# Patient Record
Sex: FEMALE | Race: WHITE | Employment: OTHER | URBAN - METROPOLITAN AREA
[De-identification: names, ages, dates, MRNs, and addresses within clinical notes are randomized per-mention and may not be internally consistent; named-entity substitution may affect disease eponyms.]

---

## 2017-01-04 ENCOUNTER — DOCTOR'S OFFICE (OUTPATIENT)
Dept: URBAN - METROPOLITAN AREA CLINIC 136 | Facility: CLINIC | Age: 82
Setting detail: OPHTHALMOLOGY
End: 2017-01-04
Payer: COMMERCIAL

## 2017-01-04 DIAGNOSIS — H27.8: ICD-10-CM

## 2017-01-04 DIAGNOSIS — H40.011: ICD-10-CM

## 2017-01-04 DIAGNOSIS — H04.121: ICD-10-CM

## 2017-01-04 DIAGNOSIS — H40.012: ICD-10-CM

## 2017-01-04 DIAGNOSIS — H04.122: ICD-10-CM

## 2017-01-04 DIAGNOSIS — H43.813: ICD-10-CM

## 2017-01-04 DIAGNOSIS — H27.9: ICD-10-CM

## 2017-01-04 PROCEDURE — 92012 INTRM OPH EXAM EST PATIENT: CPT | Performed by: OPHTHALMOLOGY

## 2017-01-04 PROCEDURE — 83861 MICROFLUID ANALY TEARS: CPT | Performed by: OPHTHALMOLOGY

## 2017-01-04 ASSESSMENT — REFRACTION_AUTOREFRACTION
OS_CYLINDER: -1.00
OD_AXIS: 114
OD_CYLINDER: -1.25
OS_AXIS: 100
OS_SPHERE: +1.25
OD_SPHERE: +0.50

## 2017-01-04 ASSESSMENT — PUNCTA - ASSESSMENT: OD_PUNCTA: SIL PLUG RLL

## 2017-01-04 ASSESSMENT — REFRACTION_MANIFEST
OD_VA1: 20/
OS_VA1: 20/
OS_VA2: 20/
OS_VA2: 20/
OD_VA2: 20/
OU_VA: 20/
OD_VA2: 20/
OD_VA3: 20/
OS_VA3: 20/
OD_VA1: 20/
OS_VA1: 20/
OD_VA3: 20/
OU_VA: 20/
OD_VA3: 20/
OD_VA1: 20/
OD_VA2: 20/
OU_VA: 20/
OS_VA1: 20/
OS_VA2: 20/

## 2017-01-04 ASSESSMENT — SPHEQUIV_DERIVED
OD_SPHEQUIV: -0.125
OS_SPHEQUIV: 0.75

## 2017-01-04 ASSESSMENT — REFRACTION_CURRENTRX
OS_OVR_VA: 20/
OD_OVR_VA: 20/
OD_OVR_VA: 20/
OS_OVR_VA: 20/
OS_OVR_VA: 20/
OD_OVR_VA: 20/

## 2017-01-04 ASSESSMENT — SUPERFICIAL PUNCTATE KERATITIS (SPK)
OS_SPK: T
OD_SPK: T

## 2017-01-04 ASSESSMENT — CONFRONTATIONAL VISUAL FIELD TEST (CVF)
OD_FINDINGS: FULL
OS_FINDINGS: FULL

## 2017-01-04 ASSESSMENT — VISUAL ACUITY
OD_BCVA: 20/40
OS_BCVA: 20/40+2

## 2017-02-28 ENCOUNTER — DOCTOR'S OFFICE (OUTPATIENT)
Dept: URBAN - METROPOLITAN AREA CLINIC 136 | Facility: CLINIC | Age: 82
Setting detail: OPHTHALMOLOGY
End: 2017-02-28
Payer: COMMERCIAL

## 2017-02-28 DIAGNOSIS — H27.8: ICD-10-CM

## 2017-02-28 DIAGNOSIS — H27.9: ICD-10-CM

## 2017-02-28 DIAGNOSIS — H40.011: ICD-10-CM

## 2017-02-28 DIAGNOSIS — H04.121: ICD-10-CM

## 2017-02-28 DIAGNOSIS — H43.813: ICD-10-CM

## 2017-02-28 DIAGNOSIS — H40.012: ICD-10-CM

## 2017-02-28 DIAGNOSIS — H04.122: ICD-10-CM

## 2017-02-28 PROCEDURE — 92012 INTRM OPH EXAM EST PATIENT: CPT | Performed by: OPHTHALMOLOGY

## 2017-02-28 PROCEDURE — 83861 MICROFLUID ANALY TEARS: CPT | Performed by: OPHTHALMOLOGY

## 2017-02-28 ASSESSMENT — PUNCTA - ASSESSMENT: OD_PUNCTA: SIL PLUG RLL

## 2017-02-28 ASSESSMENT — REFRACTION_MANIFEST
OS_VA1: 20/
OU_VA: 20/
OS_VA1: 20/
OS_VA3: 20/
OD_VA3: 20/
OD_VA3: 20/
OD_VA1: 20/
OD_VA2: 20/
OU_VA: 20/
OS_VA1: 20/
OD_VA2: 20/
OS_VA2: 20/
OS_VA2: 20/
OU_VA: 20/
OD_VA1: 20/
OS_VA2: 20/
OS_VA3: 20/
OD_VA3: 20/
OD_VA1: 20/
OS_VA3: 20/
OD_VA2: 20/

## 2017-02-28 ASSESSMENT — VISUAL ACUITY
OS_BCVA: 20/30+2
OD_BCVA: 20/50-1

## 2017-02-28 ASSESSMENT — REFRACTION_AUTOREFRACTION
OD_SPHERE: +0.50
OS_SPHERE: +1.25
OS_AXIS: 100
OS_CYLINDER: -1.00
OD_CYLINDER: -1.25
OD_AXIS: 114

## 2017-02-28 ASSESSMENT — SUPERFICIAL PUNCTATE KERATITIS (SPK)
OS_SPK: T
OD_SPK: ABSENT

## 2017-02-28 ASSESSMENT — SPHEQUIV_DERIVED
OS_SPHEQUIV: 0.75
OD_SPHEQUIV: -0.125

## 2017-02-28 ASSESSMENT — REFRACTION_CURRENTRX
OS_OVR_VA: 20/
OD_OVR_VA: 20/
OD_OVR_VA: 20/
OS_OVR_VA: 20/
OS_OVR_VA: 20/
OD_OVR_VA: 20/

## 2017-02-28 ASSESSMENT — CONFRONTATIONAL VISUAL FIELD TEST (CVF)
OS_FINDINGS: FULL
OD_FINDINGS: FULL

## 2017-05-15 ENCOUNTER — DOCTOR'S OFFICE (OUTPATIENT)
Dept: URBAN - METROPOLITAN AREA CLINIC 136 | Facility: CLINIC | Age: 82
Setting detail: OPHTHALMOLOGY
End: 2017-05-15
Payer: COMMERCIAL

## 2017-05-15 DIAGNOSIS — H34.8322: ICD-10-CM

## 2017-05-15 PROCEDURE — 92014 COMPRE OPH EXAM EST PT 1/>: CPT | Performed by: OPHTHALMOLOGY

## 2017-05-15 ASSESSMENT — REFRACTION_AUTOREFRACTION
OD_SPHERE: +0.50
OD_AXIS: 114
OS_CYLINDER: -1.00
OS_AXIS: 100
OD_CYLINDER: -1.25
OS_SPHERE: +1.25

## 2017-05-15 ASSESSMENT — REFRACTION_MANIFEST
OS_VA1: 20/
OU_VA: 20/
OS_VA2: 20/
OD_VA1: 20/
OU_VA: 20/
OS_VA3: 20/
OD_VA1: 20/
OS_VA1: 20/
OD_VA1: 20/
OD_VA3: 20/
OS_VA1: 20/
OD_VA3: 20/
OS_VA3: 20/
OU_VA: 20/
OD_VA2: 20/
OD_VA2: 20/
OS_VA3: 20/
OS_VA2: 20/
OS_VA2: 20/
OD_VA2: 20/
OD_VA3: 20/

## 2017-05-15 ASSESSMENT — SPHEQUIV_DERIVED
OD_SPHEQUIV: -0.125
OS_SPHEQUIV: 0.75

## 2017-05-15 ASSESSMENT — REFRACTION_CURRENTRX
OS_OVR_VA: 20/
OD_OVR_VA: 20/

## 2017-05-15 ASSESSMENT — SUPERFICIAL PUNCTATE KERATITIS (SPK)
OS_SPK: T
OD_SPK: ABSENT

## 2017-05-15 ASSESSMENT — VISUAL ACUITY
OD_BCVA: 20/40
OS_BCVA: 20/20

## 2017-05-15 ASSESSMENT — CONFRONTATIONAL VISUAL FIELD TEST (CVF)
OD_FINDINGS: FULL
OS_FINDINGS: FULL

## 2017-05-15 ASSESSMENT — PUNCTA - ASSESSMENT: OD_PUNCTA: SIL PLUG RLL

## 2017-05-22 ENCOUNTER — DOCTOR'S OFFICE (OUTPATIENT)
Dept: URBAN - METROPOLITAN AREA CLINIC 136 | Facility: CLINIC | Age: 82
Setting detail: OPHTHALMOLOGY
End: 2017-05-22
Payer: COMMERCIAL

## 2017-05-22 ENCOUNTER — RX ONLY (RX ONLY)
Age: 82
End: 2017-05-22

## 2017-05-22 DIAGNOSIS — H43.813: ICD-10-CM

## 2017-05-22 DIAGNOSIS — H40.012: ICD-10-CM

## 2017-05-22 DIAGNOSIS — H27.8: ICD-10-CM

## 2017-05-22 DIAGNOSIS — Q14.8: ICD-10-CM

## 2017-05-22 DIAGNOSIS — H04.121: ICD-10-CM

## 2017-05-22 DIAGNOSIS — H40.011: ICD-10-CM

## 2017-05-22 DIAGNOSIS — H35.81: ICD-10-CM

## 2017-05-22 DIAGNOSIS — H27.9: ICD-10-CM

## 2017-05-22 DIAGNOSIS — H04.122: ICD-10-CM

## 2017-05-22 PROCEDURE — 92250 FUNDUS PHOTOGRAPHY W/I&R: CPT | Performed by: OPHTHALMOLOGY

## 2017-05-22 PROCEDURE — 92014 COMPRE OPH EXAM EST PT 1/>: CPT | Performed by: OPHTHALMOLOGY

## 2017-05-22 PROCEDURE — 92235 FLUORESCEIN ANGRPH MLTIFRAME: CPT | Performed by: OPHTHALMOLOGY

## 2017-05-22 PROCEDURE — 67028 INJECTION EYE DRUG: CPT | Performed by: OPHTHALMOLOGY

## 2017-05-22 ASSESSMENT — VISUAL ACUITY
OD_BCVA: 20/40
OS_BCVA: 20/25+2

## 2017-05-22 ASSESSMENT — REFRACTION_MANIFEST
OS_VA2: 20/
OD_VA2: 20/
OS_VA1: 20/
OS_VA2: 20/
OD_VA3: 20/
OS_VA3: 20/
OD_VA1: 20/
OU_VA: 20/
OD_VA3: 20/
OD_VA3: 20/
OU_VA: 20/
OD_VA1: 20/
OS_VA1: 20/
OS_VA3: 20/
OD_VA2: 20/
OS_VA1: 20/
OU_VA: 20/
OS_VA3: 20/
OD_VA1: 20/
OD_VA2: 20/
OS_VA2: 20/

## 2017-05-22 ASSESSMENT — REFRACTION_AUTOREFRACTION
OS_SPHERE: +1.25
OD_AXIS: 114
OS_AXIS: 100
OD_SPHERE: +0.50
OD_CYLINDER: -1.25
OS_CYLINDER: -1.00

## 2017-05-22 ASSESSMENT — REFRACTION_CURRENTRX
OD_OVR_VA: 20/
OS_OVR_VA: 20/
OD_OVR_VA: 20/
OD_OVR_VA: 20/

## 2017-05-22 ASSESSMENT — SPHEQUIV_DERIVED
OD_SPHEQUIV: -0.125
OS_SPHEQUIV: 0.75

## 2017-05-22 ASSESSMENT — CONFRONTATIONAL VISUAL FIELD TEST (CVF)
OS_FINDINGS: FULL
OD_FINDINGS: FULL

## 2017-05-22 ASSESSMENT — PUNCTA - ASSESSMENT: OD_PUNCTA: SIL PLUG RLL

## 2017-05-22 ASSESSMENT — SUPERFICIAL PUNCTATE KERATITIS (SPK)
OS_SPK: T
OD_SPK: ABSENT

## 2017-06-30 ENCOUNTER — DOCTOR'S OFFICE (OUTPATIENT)
Dept: URBAN - METROPOLITAN AREA CLINIC 136 | Facility: CLINIC | Age: 82
Setting detail: OPHTHALMOLOGY
End: 2017-06-30
Payer: COMMERCIAL

## 2017-06-30 DIAGNOSIS — H43.813: ICD-10-CM

## 2017-06-30 DIAGNOSIS — H40.012: ICD-10-CM

## 2017-06-30 DIAGNOSIS — H40.011: ICD-10-CM

## 2017-06-30 DIAGNOSIS — H04.122: ICD-10-CM

## 2017-06-30 DIAGNOSIS — Q14.8: ICD-10-CM

## 2017-06-30 DIAGNOSIS — H27.8: ICD-10-CM

## 2017-06-30 DIAGNOSIS — H35.81: ICD-10-CM

## 2017-06-30 DIAGNOSIS — H04.121: ICD-10-CM

## 2017-06-30 DIAGNOSIS — G43.809: ICD-10-CM

## 2017-06-30 DIAGNOSIS — H27.9: ICD-10-CM

## 2017-06-30 PROCEDURE — 92014 COMPRE OPH EXAM EST PT 1/>: CPT | Performed by: OPHTHALMOLOGY

## 2017-06-30 PROCEDURE — 92134 CPTRZ OPH DX IMG PST SGM RTA: CPT | Performed by: OPHTHALMOLOGY

## 2017-06-30 ASSESSMENT — SPHEQUIV_DERIVED
OD_SPHEQUIV: -0.125
OS_SPHEQUIV: 0.75

## 2017-06-30 ASSESSMENT — SUPERFICIAL PUNCTATE KERATITIS (SPK)
OS_SPK: T
OD_SPK: ABSENT

## 2017-06-30 ASSESSMENT — REFRACTION_MANIFEST
OD_VA2: 20/
OD_VA2: 20/
OS_VA2: 20/
OU_VA: 20/
OS_VA1: 20/
OD_VA3: 20/
OS_VA3: 20/
OS_VA1: 20/
OD_VA2: 20/
OU_VA: 20/
OD_VA1: 20/
OD_VA3: 20/
OS_VA2: 20/
OS_VA1: 20/
OD_VA1: 20/
OU_VA: 20/
OS_VA2: 20/
OD_VA3: 20/
OS_VA3: 20/
OS_VA3: 20/
OD_VA1: 20/

## 2017-06-30 ASSESSMENT — REFRACTION_CURRENTRX
OS_OVR_VA: 20/
OD_OVR_VA: 20/

## 2017-06-30 ASSESSMENT — PUNCTA - ASSESSMENT: OD_PUNCTA: SIL PLUG RLL

## 2017-06-30 ASSESSMENT — REFRACTION_AUTOREFRACTION
OD_AXIS: 114
OS_SPHERE: +1.25
OD_CYLINDER: -1.25
OS_CYLINDER: -1.00
OD_SPHERE: +0.50
OS_AXIS: 100

## 2017-06-30 ASSESSMENT — CONFRONTATIONAL VISUAL FIELD TEST (CVF)
OS_FINDINGS: FULL
OD_FINDINGS: FULL

## 2017-06-30 ASSESSMENT — VISUAL ACUITY
OD_BCVA: 20/40-2
OS_BCVA: 20/20-2

## 2017-07-24 ENCOUNTER — DOCTOR'S OFFICE (OUTPATIENT)
Dept: URBAN - METROPOLITAN AREA CLINIC 136 | Facility: CLINIC | Age: 82
Setting detail: OPHTHALMOLOGY
End: 2017-07-24
Payer: COMMERCIAL

## 2017-07-24 DIAGNOSIS — Z96.1: ICD-10-CM

## 2017-07-24 DIAGNOSIS — Q14.8: ICD-10-CM

## 2017-07-24 DIAGNOSIS — H43.813: ICD-10-CM

## 2017-07-24 DIAGNOSIS — G43.809: ICD-10-CM

## 2017-07-24 DIAGNOSIS — H35.81: ICD-10-CM

## 2017-07-24 PROCEDURE — 92134 CPTRZ OPH DX IMG PST SGM RTA: CPT | Performed by: OPHTHALMOLOGY

## 2017-07-24 PROCEDURE — 92014 COMPRE OPH EXAM EST PT 1/>: CPT | Performed by: OPHTHALMOLOGY

## 2017-07-24 ASSESSMENT — REFRACTION_AUTOREFRACTION
OD_SPHERE: +0.50
OS_SPHERE: +1.25
OS_CYLINDER: -1.00
OD_CYLINDER: -1.25
OS_AXIS: 100
OD_AXIS: 114

## 2017-07-24 ASSESSMENT — REFRACTION_MANIFEST
OD_VA1: 20/
OS_VA3: 20/
OS_VA2: 20/
OD_VA2: 20/
OS_VA3: 20/
OS_VA3: 20/
OD_VA3: 20/
OD_VA2: 20/
OS_VA1: 20/
OU_VA: 20/
OS_VA1: 20/
OD_VA3: 20/
OS_VA2: 20/
OD_VA1: 20/
OS_VA2: 20/
OD_VA3: 20/
OD_VA2: 20/
OU_VA: 20/
OU_VA: 20/
OS_VA1: 20/
OD_VA1: 20/

## 2017-07-24 ASSESSMENT — REFRACTION_CURRENTRX
OD_OVR_VA: 20/
OS_OVR_VA: 20/
OS_OVR_VA: 20/
OD_OVR_VA: 20/
OD_OVR_VA: 20/
OS_OVR_VA: 20/

## 2017-07-24 ASSESSMENT — SPHEQUIV_DERIVED
OS_SPHEQUIV: 0.75
OD_SPHEQUIV: -0.125

## 2017-07-24 ASSESSMENT — SUPERFICIAL PUNCTATE KERATITIS (SPK)
OS_SPK: T
OD_SPK: ABSENT

## 2017-07-24 ASSESSMENT — VISUAL ACUITY
OD_BCVA: 20/40+3
OS_BCVA: 20/20-2

## 2017-07-24 ASSESSMENT — CONFRONTATIONAL VISUAL FIELD TEST (CVF)
OS_FINDINGS: FULL
OD_FINDINGS: FULL

## 2017-07-24 ASSESSMENT — PUNCTA - ASSESSMENT: OD_PUNCTA: SIL PLUG RLL

## 2017-09-26 ENCOUNTER — DOCTOR'S OFFICE (OUTPATIENT)
Dept: URBAN - METROPOLITAN AREA CLINIC 136 | Facility: CLINIC | Age: 82
Setting detail: OPHTHALMOLOGY
End: 2017-09-26
Payer: COMMERCIAL

## 2017-09-26 DIAGNOSIS — H04.123: ICD-10-CM

## 2017-09-26 DIAGNOSIS — Q14.8: ICD-10-CM

## 2017-09-26 DIAGNOSIS — H35.81: ICD-10-CM

## 2017-09-26 DIAGNOSIS — H43.813: ICD-10-CM

## 2017-09-26 PROCEDURE — 92134 CPTRZ OPH DX IMG PST SGM RTA: CPT | Performed by: OPHTHALMOLOGY

## 2017-09-26 PROCEDURE — 92012 INTRM OPH EXAM EST PATIENT: CPT | Performed by: OPHTHALMOLOGY

## 2017-09-26 ASSESSMENT — REFRACTION_MANIFEST
OU_VA: 20/
OS_VA1: 20/
OU_VA: 20/
OU_VA: 20/
OS_VA2: 20/
OS_VA3: 20/
OS_VA2: 20/
OS_VA3: 20/
OS_VA2: 20/
OS_VA1: 20/
OS_VA1: 20/
OD_VA3: 20/
OD_VA2: 20/
OD_VA1: 20/
OD_VA2: 20/
OD_VA3: 20/
OS_VA3: 20/
OD_VA3: 20/
OD_VA1: 20/
OD_VA2: 20/
OD_VA1: 20/

## 2017-09-26 ASSESSMENT — REFRACTION_AUTOREFRACTION
OS_AXIS: 100
OS_SPHERE: +1.25
OD_AXIS: 114
OD_CYLINDER: -1.25
OD_SPHERE: +0.50
OS_CYLINDER: -1.00

## 2017-09-26 ASSESSMENT — SPHEQUIV_DERIVED
OS_SPHEQUIV: 0.75
OD_SPHEQUIV: -0.125

## 2017-09-26 ASSESSMENT — REFRACTION_CURRENTRX
OD_OVR_VA: 20/
OD_OVR_VA: 20/
OS_OVR_VA: 20/
OD_OVR_VA: 20/

## 2017-09-26 ASSESSMENT — VISUAL ACUITY
OD_BCVA: 20/30-2
OS_BCVA: 20/30

## 2017-09-26 ASSESSMENT — CONFRONTATIONAL VISUAL FIELD TEST (CVF)
OS_FINDINGS: FULL
OD_FINDINGS: FULL

## 2017-09-26 ASSESSMENT — SUPERFICIAL PUNCTATE KERATITIS (SPK)
OD_SPK: T
OS_SPK: T

## 2017-09-26 ASSESSMENT — TEAR BREAK UP TIME (TBUT)
OD_TBUT: LOW TEAR FILM
OS_TBUT: LOW TEAR FILM

## 2017-09-26 ASSESSMENT — PUNCTA - ASSESSMENT: OD_PUNCTA: SIL PLUG RLL

## 2017-12-26 ENCOUNTER — DOCTOR'S OFFICE (OUTPATIENT)
Dept: URBAN - METROPOLITAN AREA CLINIC 136 | Facility: CLINIC | Age: 82
Setting detail: OPHTHALMOLOGY
End: 2017-12-26
Payer: COMMERCIAL

## 2017-12-26 ENCOUNTER — RX ONLY (RX ONLY)
Age: 82
End: 2017-12-26

## 2017-12-26 DIAGNOSIS — H40.013: ICD-10-CM

## 2017-12-26 DIAGNOSIS — H43.813: ICD-10-CM

## 2017-12-26 DIAGNOSIS — Q14.8: ICD-10-CM

## 2017-12-26 DIAGNOSIS — Z96.1: ICD-10-CM

## 2017-12-26 DIAGNOSIS — H35.81: ICD-10-CM

## 2017-12-26 DIAGNOSIS — H04.123: ICD-10-CM

## 2017-12-26 PROCEDURE — 92134 CPTRZ OPH DX IMG PST SGM RTA: CPT | Performed by: OPHTHALMOLOGY

## 2017-12-26 PROCEDURE — 92012 INTRM OPH EXAM EST PATIENT: CPT | Performed by: OPHTHALMOLOGY

## 2017-12-26 ASSESSMENT — CONFRONTATIONAL VISUAL FIELD TEST (CVF)
OD_FINDINGS: FULL
OS_FINDINGS: FULL

## 2017-12-26 ASSESSMENT — SUPERFICIAL PUNCTATE KERATITIS (SPK)
OS_SPK: T
OD_SPK: T

## 2017-12-26 ASSESSMENT — TEAR BREAK UP TIME (TBUT)
OD_TBUT: LOW TEAR FILM
OS_TBUT: LOW TEAR FILM

## 2017-12-26 ASSESSMENT — PUNCTA - ASSESSMENT: OD_PUNCTA: SIL PLUG RLL

## 2017-12-27 ASSESSMENT — REFRACTION_MANIFEST
OD_VA2: 20/
OD_VA3: 20/
OD_VA1: 20/
OD_VA2: 20/
OS_VA3: 20/
OU_VA: 20/
OS_VA1: 20/
OD_VA2: 20/
OS_VA3: 20/
OS_VA3: 20/
OD_VA1: 20/
OS_VA2: 20/
OU_VA: 20/
OD_VA1: 20/
OS_VA1: 20/
OU_VA: 20/
OS_VA1: 20/
OD_VA3: 20/
OS_VA2: 20/
OD_VA3: 20/
OS_VA2: 20/

## 2017-12-27 ASSESSMENT — REFRACTION_CURRENTRX
OD_OVR_VA: 20/
OD_OVR_VA: 20/
OS_OVR_VA: 20/
OS_OVR_VA: 20/
OD_OVR_VA: 20/
OS_OVR_VA: 20/

## 2017-12-27 ASSESSMENT — REFRACTION_AUTOREFRACTION
OD_CYLINDER: -1.25
OD_SPHERE: +0.50
OS_CYLINDER: -1.00
OD_AXIS: 114
OS_SPHERE: +1.25
OS_AXIS: 100

## 2017-12-27 ASSESSMENT — SPHEQUIV_DERIVED
OD_SPHEQUIV: -0.125
OS_SPHEQUIV: 0.75

## 2017-12-27 ASSESSMENT — VISUAL ACUITY
OS_BCVA: 20/30
OD_BCVA: 20/40

## 2018-01-29 ENCOUNTER — DOCTOR'S OFFICE (OUTPATIENT)
Dept: URBAN - METROPOLITAN AREA CLINIC 136 | Facility: CLINIC | Age: 83
Setting detail: OPHTHALMOLOGY
End: 2018-01-29
Payer: COMMERCIAL

## 2018-01-29 DIAGNOSIS — H04.123: ICD-10-CM

## 2018-01-29 DIAGNOSIS — Z96.1: ICD-10-CM

## 2018-01-29 DIAGNOSIS — H35.81: ICD-10-CM

## 2018-01-29 DIAGNOSIS — Q14.8: ICD-10-CM

## 2018-01-29 DIAGNOSIS — H43.813: ICD-10-CM

## 2018-01-29 DIAGNOSIS — H40.013: ICD-10-CM

## 2018-01-29 PROCEDURE — 92012 INTRM OPH EXAM EST PATIENT: CPT | Performed by: OPHTHALMOLOGY

## 2018-01-29 PROCEDURE — 92134 CPTRZ OPH DX IMG PST SGM RTA: CPT | Performed by: OPHTHALMOLOGY

## 2018-01-29 ASSESSMENT — REFRACTION_MANIFEST
OU_VA: 20/
OS_VA3: 20/
OS_VA3: 20/
OD_VA2: 20/
OS_VA2: 20/
OD_VA2: 20/
OD_VA3: 20/
OS_VA1: 20/
OD_VA3: 20/
OS_VA3: 20/
OD_VA1: 20/
OS_VA1: 20/
OS_VA2: 20/
OS_VA2: 20/
OD_VA1: 20/
OD_VA1: 20/
OD_VA3: 20/
OU_VA: 20/
OU_VA: 20/
OS_VA1: 20/
OD_VA2: 20/

## 2018-01-29 ASSESSMENT — TEAR BREAK UP TIME (TBUT)
OD_TBUT: LOW TEAR FILM
OS_TBUT: LOW TEAR FILM

## 2018-01-29 ASSESSMENT — REFRACTION_AUTOREFRACTION
OS_SPHERE: +1.25
OD_CYLINDER: -1.25
OD_AXIS: 114
OD_SPHERE: +0.50
OS_AXIS: 100
OS_CYLINDER: -1.00

## 2018-01-29 ASSESSMENT — REFRACTION_CURRENTRX
OS_OVR_VA: 20/
OS_OVR_VA: 20/
OD_OVR_VA: 20/
OS_OVR_VA: 20/
OD_OVR_VA: 20/
OD_OVR_VA: 20/

## 2018-01-29 ASSESSMENT — CONFRONTATIONAL VISUAL FIELD TEST (CVF)
OS_FINDINGS: FULL
OD_FINDINGS: FULL

## 2018-01-29 ASSESSMENT — SPHEQUIV_DERIVED
OS_SPHEQUIV: 0.75
OD_SPHEQUIV: -0.125

## 2018-01-29 ASSESSMENT — VISUAL ACUITY
OD_BCVA: 20/40-1
OS_BCVA: 20/25-2

## 2018-01-29 ASSESSMENT — SUPERFICIAL PUNCTATE KERATITIS (SPK)
OS_SPK: T
OD_SPK: T

## 2018-01-29 ASSESSMENT — PUNCTA - ASSESSMENT: OD_PUNCTA: SIL PLUG RLL

## 2018-01-29 ASSESSMENT — LID EXAM ASSESSMENTS
OS_BLEPHARITIS: 1+
OD_BLEPHARITIS: 1+

## 2018-03-29 ENCOUNTER — DOCTOR'S OFFICE (OUTPATIENT)
Dept: URBAN - METROPOLITAN AREA CLINIC 136 | Facility: CLINIC | Age: 83
Setting detail: OPHTHALMOLOGY
End: 2018-03-29
Payer: COMMERCIAL

## 2018-03-29 DIAGNOSIS — H04.123: ICD-10-CM

## 2018-03-29 DIAGNOSIS — H43.813: ICD-10-CM

## 2018-03-29 DIAGNOSIS — H01.001: ICD-10-CM

## 2018-03-29 DIAGNOSIS — H35.3211: ICD-10-CM

## 2018-03-29 DIAGNOSIS — Q14.8: ICD-10-CM

## 2018-03-29 DIAGNOSIS — H01.005: ICD-10-CM

## 2018-03-29 DIAGNOSIS — H40.011: ICD-10-CM

## 2018-03-29 DIAGNOSIS — H35.81: ICD-10-CM

## 2018-03-29 DIAGNOSIS — Z96.1: ICD-10-CM

## 2018-03-29 DIAGNOSIS — H01.002: ICD-10-CM

## 2018-03-29 DIAGNOSIS — H01.004: ICD-10-CM

## 2018-03-29 PROCEDURE — 92014 COMPRE OPH EXAM EST PT 1/>: CPT | Performed by: OPHTHALMOLOGY

## 2018-03-29 PROCEDURE — 92134 CPTRZ OPH DX IMG PST SGM RTA: CPT | Performed by: OPHTHALMOLOGY

## 2018-03-29 ASSESSMENT — TEAR BREAK UP TIME (TBUT)
OD_TBUT: LOW TEAR FILM
OS_TBUT: LOW TEAR FILM

## 2018-03-29 ASSESSMENT — SUPERFICIAL PUNCTATE KERATITIS (SPK)
OD_SPK: T
OS_SPK: T

## 2018-03-29 ASSESSMENT — CONFRONTATIONAL VISUAL FIELD TEST (CVF)
OS_FINDINGS: FULL
OD_FINDINGS: FULL

## 2018-03-29 ASSESSMENT — LID EXAM ASSESSMENTS
OD_BLEPHARITIS: 1+
OS_BLEPHARITIS: 1+

## 2018-03-29 ASSESSMENT — PUNCTA - ASSESSMENT: OD_PUNCTA: SIL PLUG RLL

## 2018-05-22 ENCOUNTER — RX ONLY (RX ONLY)
Age: 83
End: 2018-05-22

## 2018-05-22 ENCOUNTER — DOCTOR'S OFFICE (OUTPATIENT)
Dept: URBAN - METROPOLITAN AREA CLINIC 136 | Facility: CLINIC | Age: 83
Setting detail: OPHTHALMOLOGY
End: 2018-05-22
Payer: COMMERCIAL

## 2018-05-22 DIAGNOSIS — H43.813: ICD-10-CM

## 2018-05-22 DIAGNOSIS — H35.3211: ICD-10-CM

## 2018-05-22 DIAGNOSIS — H04.121: ICD-10-CM

## 2018-05-22 DIAGNOSIS — H40.011: ICD-10-CM

## 2018-05-22 DIAGNOSIS — Z96.1: ICD-10-CM

## 2018-05-22 DIAGNOSIS — H40.012: ICD-10-CM

## 2018-05-22 DIAGNOSIS — H35.81: ICD-10-CM

## 2018-05-22 DIAGNOSIS — Q14.8: ICD-10-CM

## 2018-05-22 DIAGNOSIS — H04.122: ICD-10-CM

## 2018-05-22 DIAGNOSIS — H00.15: ICD-10-CM

## 2018-05-22 PROCEDURE — 92134 CPTRZ OPH DX IMG PST SGM RTA: CPT | Performed by: OPHTHALMOLOGY

## 2018-05-22 PROCEDURE — 92014 COMPRE OPH EXAM EST PT 1/>: CPT | Performed by: OPHTHALMOLOGY

## 2018-05-22 ASSESSMENT — REFRACTION_AUTOREFRACTION
OD_SPHERE: +0.50
OD_CYLINDER: -1.25
OD_AXIS: 114
OS_AXIS: 100
OS_SPHERE: +1.25
OS_CYLINDER: -1.00
OS_AXIS: 100
OD_CYLINDER: -1.25
OD_AXIS: 114
OS_SPHERE: +1.25
OD_SPHERE: +0.50
OS_CYLINDER: -1.00

## 2018-05-22 ASSESSMENT — REFRACTION_MANIFEST
OS_VA1: 20/
OD_VA3: 20/
OD_VA3: 20/
OD_VA1: 20/
OU_VA: 20/
OD_VA3: 20/
OD_VA2: 20/
OS_VA1: 20/
OS_VA3: 20/
OS_VA1: 20/
OS_VA3: 20/
OU_VA: 20/
OD_VA1: 20/
OS_VA1: 20/
OS_VA1: 20/
OS_VA2: 20/
OD_VA2: 20/
OS_VA2: 20/
OS_VA1: 20/
OD_VA2: 20/
OU_VA: 20/
OU_VA: 20/
OD_VA1: 20/
OD_VA1: 20/
OS_VA2: 20/
OD_VA3: 20/
OD_VA2: 20/
OD_VA1: 20/
OD_VA1: 20/
OS_VA2: 20/
OU_VA: 20/
OS_VA3: 20/
OD_VA2: 20/
OU_VA: 20/
OD_VA2: 20/
OS_VA2: 20/
OS_VA3: 20/
OS_VA2: 20/
OS_VA3: 20/
OS_VA3: 20/

## 2018-05-22 ASSESSMENT — TEAR BREAK UP TIME (TBUT)
OD_TBUT: LOW TEAR FILM
OS_TBUT: LOW TEAR FILM

## 2018-05-22 ASSESSMENT — LID EXAM ASSESSMENTS
OD_BLEPHARITIS: 1+
OS_BLEPHARITIS: 1+

## 2018-05-22 ASSESSMENT — SPHEQUIV_DERIVED
OS_SPHEQUIV: 0.75
OS_SPHEQUIV: 0.75
OD_SPHEQUIV: -0.125
OD_SPHEQUIV: -0.125

## 2018-05-22 ASSESSMENT — SUPERFICIAL PUNCTATE KERATITIS (SPK)
OD_SPK: T
OS_SPK: T

## 2018-05-22 ASSESSMENT — VISUAL ACUITY
OS_BCVA: 20/25-2
OD_BCVA: 20/40
OS_BCVA: 20/25-2
OD_BCVA: 20/40

## 2018-05-22 ASSESSMENT — PUNCTA - ASSESSMENT: OD_PUNCTA: SIL PLUG RLL

## 2018-05-22 ASSESSMENT — REFRACTION_CURRENTRX
OD_OVR_VA: 20/
OS_OVR_VA: 20/
OD_OVR_VA: 20/
OD_OVR_VA: 20/
OS_OVR_VA: 20/
OD_OVR_VA: 20/
OS_OVR_VA: 20/
OS_OVR_VA: 20/
OD_OVR_VA: 20/
OD_OVR_VA: 20/
OS_OVR_VA: 20/
OS_OVR_VA: 20/

## 2018-05-22 ASSESSMENT — CONFRONTATIONAL VISUAL FIELD TEST (CVF)
OS_FINDINGS: FULL
OD_FINDINGS: FULL

## 2018-06-08 ENCOUNTER — DOCTOR'S OFFICE (OUTPATIENT)
Dept: URBAN - METROPOLITAN AREA CLINIC 136 | Facility: CLINIC | Age: 83
Setting detail: OPHTHALMOLOGY
End: 2018-06-08
Payer: COMMERCIAL

## 2018-06-08 DIAGNOSIS — H01.001: ICD-10-CM

## 2018-06-08 DIAGNOSIS — H01.004: ICD-10-CM

## 2018-06-08 DIAGNOSIS — H01.002: ICD-10-CM

## 2018-06-08 DIAGNOSIS — H00.15: ICD-10-CM

## 2018-06-08 DIAGNOSIS — H01.005: ICD-10-CM

## 2018-06-08 PROCEDURE — 92012 INTRM OPH EXAM EST PATIENT: CPT | Performed by: OPHTHALMOLOGY

## 2018-06-08 ASSESSMENT — REFRACTION_MANIFEST
OS_VA1: 20/
OS_VA1: 20/
OD_VA3: 20/
OD_VA2: 20/
OD_VA3: 20/
OD_VA1: 20/
OU_VA: 20/
OS_VA2: 20/
OS_VA2: 20/
OD_VA1: 20/
OD_VA1: 20/
OU_VA: 20/
OS_VA1: 20/
OU_VA: 20/
OS_VA3: 20/
OD_VA3: 20/
OS_VA3: 20/
OS_VA2: 20/
OS_VA3: 20/
OD_VA2: 20/
OD_VA2: 20/

## 2018-06-08 ASSESSMENT — REFRACTION_CURRENTRX
OD_OVR_VA: 20/
OD_OVR_VA: 20/
OS_OVR_VA: 20/
OD_OVR_VA: 20/
OS_OVR_VA: 20/
OS_OVR_VA: 20/

## 2018-06-08 ASSESSMENT — SPHEQUIV_DERIVED
OD_SPHEQUIV: -0.125
OS_SPHEQUIV: 0.75

## 2018-06-08 ASSESSMENT — CONFRONTATIONAL VISUAL FIELD TEST (CVF)
OD_FINDINGS: FULL
OS_FINDINGS: FULL

## 2018-06-08 ASSESSMENT — TEAR BREAK UP TIME (TBUT)
OD_TBUT: LOW TEAR FILM
OS_TBUT: LOW TEAR FILM

## 2018-06-08 ASSESSMENT — REFRACTION_AUTOREFRACTION
OS_AXIS: 100
OS_CYLINDER: -1.00
OD_CYLINDER: -1.25
OD_SPHERE: +0.50
OS_SPHERE: +1.25
OD_AXIS: 114

## 2018-06-08 ASSESSMENT — LID EXAM ASSESSMENTS
OS_COMMENTS: 1+MGD
OS_EDEMA: LUL 1+ 2+
OS_BLEPHARITIS: 1+
OD_COMMENTS: 1+MGD
OD_BLEPHARITIS: 1+

## 2018-06-08 ASSESSMENT — SUPERFICIAL PUNCTATE KERATITIS (SPK)
OS_SPK: ABSENT
OD_SPK: ABSENT

## 2018-06-08 ASSESSMENT — VISUAL ACUITY
OS_BCVA: 20/25-2
OD_BCVA: 20/60

## 2018-06-08 ASSESSMENT — PUNCTA - ASSESSMENT: OD_PUNCTA: SIL PLUG RLL

## 2018-06-14 ENCOUNTER — RX ONLY (RX ONLY)
Age: 83
End: 2018-06-14

## 2018-06-14 ENCOUNTER — DOCTOR'S OFFICE (OUTPATIENT)
Dept: URBAN - METROPOLITAN AREA CLINIC 136 | Facility: CLINIC | Age: 83
Setting detail: OPHTHALMOLOGY
End: 2018-06-14
Payer: COMMERCIAL

## 2018-06-14 DIAGNOSIS — H04.122: ICD-10-CM

## 2018-06-14 DIAGNOSIS — H01.001: ICD-10-CM

## 2018-06-14 DIAGNOSIS — H04.121: ICD-10-CM

## 2018-06-14 DIAGNOSIS — H01.002: ICD-10-CM

## 2018-06-14 DIAGNOSIS — H01.004: ICD-10-CM

## 2018-06-14 DIAGNOSIS — H01.005: ICD-10-CM

## 2018-06-14 DIAGNOSIS — H00.14: ICD-10-CM

## 2018-06-14 PROCEDURE — 99214 OFFICE O/P EST MOD 30 MIN: CPT | Performed by: OPHTHALMOLOGY

## 2018-06-14 ASSESSMENT — CONFRONTATIONAL VISUAL FIELD TEST (CVF)
OD_FINDINGS: FULL
OS_FINDINGS: FULL

## 2018-06-14 ASSESSMENT — LID EXAM ASSESSMENTS
OS_COMMENTS: 1+MGD
OD_COMMENTS: 1+MGD
OD_BLEPHARITIS: 1+
OS_BLEPHARITIS: 1+

## 2018-06-14 ASSESSMENT — PUNCTA - ASSESSMENT: OD_PUNCTA: SIL PLUG RLL

## 2018-06-14 ASSESSMENT — SUPERFICIAL PUNCTATE KERATITIS (SPK)
OD_SPK: T
OS_SPK: T

## 2018-06-20 ASSESSMENT — REFRACTION_CURRENTRX
OS_OVR_VA: 20/
OD_OVR_VA: 20/
OD_OVR_VA: 20/
OS_OVR_VA: 20/
OS_OVR_VA: 20/
OD_OVR_VA: 20/

## 2018-06-20 ASSESSMENT — REFRACTION_MANIFEST
OS_VA1: 20/
OS_VA2: 20/
OS_VA2: 20/
OS_VA1: 20/
OS_VA3: 20/
OD_VA3: 20/
OD_VA2: 20/
OU_VA: 20/
OS_VA3: 20/
OD_VA3: 20/
OD_VA1: 20/
OD_VA1: 20/
OU_VA: 20/
OU_VA: 20/
OD_VA1: 20/
OD_VA3: 20/
OD_VA2: 20/
OS_VA2: 20/
OS_VA3: 20/
OS_VA1: 20/
OD_VA2: 20/

## 2018-06-20 ASSESSMENT — REFRACTION_AUTOREFRACTION
OD_SPHERE: +0.50
OD_CYLINDER: -1.25
OS_CYLINDER: -1.00
OS_AXIS: 100
OS_SPHERE: +1.25
OD_AXIS: 114

## 2018-06-20 ASSESSMENT — VISUAL ACUITY
OD_BCVA: 20/40
OS_BCVA: 20/30+2

## 2018-06-20 ASSESSMENT — SPHEQUIV_DERIVED
OS_SPHEQUIV: 0.75
OD_SPHEQUIV: -0.125

## 2018-06-26 ENCOUNTER — DOCTOR'S OFFICE (OUTPATIENT)
Dept: URBAN - METROPOLITAN AREA CLINIC 136 | Facility: CLINIC | Age: 83
Setting detail: OPHTHALMOLOGY
End: 2018-06-26
Payer: COMMERCIAL

## 2018-06-26 DIAGNOSIS — H43.813: ICD-10-CM

## 2018-06-26 DIAGNOSIS — Q14.8: ICD-10-CM

## 2018-06-26 DIAGNOSIS — H35.81: ICD-10-CM

## 2018-06-26 DIAGNOSIS — H35.3211: ICD-10-CM

## 2018-06-26 PROCEDURE — 92012 INTRM OPH EXAM EST PATIENT: CPT | Performed by: OPHTHALMOLOGY

## 2018-06-26 PROCEDURE — 92134 CPTRZ OPH DX IMG PST SGM RTA: CPT | Performed by: OPHTHALMOLOGY

## 2018-06-26 ASSESSMENT — SUPERFICIAL PUNCTATE KERATITIS (SPK)
OD_SPK: T
OS_SPK: T

## 2018-06-26 ASSESSMENT — CONFRONTATIONAL VISUAL FIELD TEST (CVF)
OD_FINDINGS: FULL
OS_FINDINGS: FULL

## 2018-06-26 ASSESSMENT — LID EXAM ASSESSMENTS
OD_COMMENTS: 1+MGD
OS_BLEPHARITIS: 1+
OS_COMMENTS: 1+MGD
OD_BLEPHARITIS: 1+

## 2018-06-26 ASSESSMENT — PUNCTA - ASSESSMENT: OD_PUNCTA: SIL PLUG RLL

## 2018-06-27 ASSESSMENT — REFRACTION_MANIFEST
OD_VA2: 20/
OS_VA3: 20/
OS_VA2: 20/
OD_VA3: 20/
OU_VA: 20/
OD_VA2: 20/
OD_VA1: 20/
OU_VA: 20/
OD_VA3: 20/
OS_VA3: 20/
OS_VA3: 20/
OS_VA1: 20/
OD_VA3: 20/
OS_VA1: 20/
OD_VA1: 20/
OS_VA2: 20/
OS_VA1: 20/
OD_VA1: 20/
OU_VA: 20/
OS_VA2: 20/
OD_VA2: 20/

## 2018-06-27 ASSESSMENT — REFRACTION_CURRENTRX
OD_OVR_VA: 20/
OS_OVR_VA: 20/
OD_OVR_VA: 20/
OS_OVR_VA: 20/
OS_OVR_VA: 20/
OD_OVR_VA: 20/

## 2018-06-27 ASSESSMENT — REFRACTION_AUTOREFRACTION
OD_SPHERE: +0.50
OS_SPHERE: +1.25
OS_AXIS: 100
OS_CYLINDER: -1.00
OD_AXIS: 114
OD_CYLINDER: -1.25

## 2018-06-27 ASSESSMENT — VISUAL ACUITY
OD_BCVA: 20/40
OS_BCVA: 20/25-2

## 2018-06-27 ASSESSMENT — SPHEQUIV_DERIVED
OS_SPHEQUIV: 0.75
OD_SPHEQUIV: -0.125

## 2018-08-09 ENCOUNTER — DOCTOR'S OFFICE (OUTPATIENT)
Dept: URBAN - METROPOLITAN AREA CLINIC 136 | Facility: CLINIC | Age: 83
Setting detail: OPHTHALMOLOGY
End: 2018-08-09
Payer: COMMERCIAL

## 2018-08-09 DIAGNOSIS — Q14.8: ICD-10-CM

## 2018-08-09 DIAGNOSIS — H43.813: ICD-10-CM

## 2018-08-09 DIAGNOSIS — H35.3211: ICD-10-CM

## 2018-08-09 DIAGNOSIS — H35.81: ICD-10-CM

## 2018-08-09 PROCEDURE — 92134 CPTRZ OPH DX IMG PST SGM RTA: CPT | Performed by: OPHTHALMOLOGY

## 2018-08-09 PROCEDURE — 92014 COMPRE OPH EXAM EST PT 1/>: CPT | Performed by: OPHTHALMOLOGY

## 2018-08-09 ASSESSMENT — CONFRONTATIONAL VISUAL FIELD TEST (CVF)
OD_FINDINGS: FULL
OS_FINDINGS: FULL

## 2018-08-09 ASSESSMENT — REFRACTION_MANIFEST
OS_VA3: 20/
OD_VA2: 20/
OD_VA2: 20/
OU_VA: 20/
OS_VA2: 20/
OD_VA3: 20/
OD_VA1: 20/
OS_VA3: 20/
OS_VA1: 20/
OU_VA: 20/
OS_VA3: 20/
OS_VA1: 20/
OD_VA1: 20/
OD_VA2: 20/
OD_VA3: 20/
OD_VA3: 20/
OS_VA1: 20/
OD_VA1: 20/
OS_VA2: 20/
OS_VA2: 20/
OU_VA: 20/

## 2018-08-09 ASSESSMENT — REFRACTION_CURRENTRX
OS_OVR_VA: 20/
OS_OVR_VA: 20/
OD_OVR_VA: 20/
OS_OVR_VA: 20/
OD_OVR_VA: 20/
OD_OVR_VA: 20/

## 2018-08-09 ASSESSMENT — SUPERFICIAL PUNCTATE KERATITIS (SPK)
OS_SPK: T
OD_SPK: T

## 2018-08-09 ASSESSMENT — SPHEQUIV_DERIVED
OD_SPHEQUIV: -0.125
OS_SPHEQUIV: 0.75

## 2018-08-09 ASSESSMENT — VISUAL ACUITY
OS_BCVA: 20/25-2
OD_BCVA: 20/40

## 2018-08-09 ASSESSMENT — REFRACTION_AUTOREFRACTION
OS_AXIS: 100
OD_CYLINDER: -1.25
OS_CYLINDER: -1.00
OS_SPHERE: +1.25
OD_AXIS: 114
OD_SPHERE: +0.50

## 2018-08-09 ASSESSMENT — LID EXAM ASSESSMENTS
OD_COMMENTS: 1+MGD
OS_BLEPHARITIS: 1+
OS_COMMENTS: 1+MGD
OD_BLEPHARITIS: 1+

## 2018-08-09 ASSESSMENT — PUNCTA - ASSESSMENT: OD_PUNCTA: SIL PLUG RLL

## 2018-09-20 ENCOUNTER — DOCTOR'S OFFICE (OUTPATIENT)
Dept: URBAN - METROPOLITAN AREA CLINIC 136 | Facility: CLINIC | Age: 83
Setting detail: OPHTHALMOLOGY
End: 2018-09-20
Payer: COMMERCIAL

## 2018-09-20 DIAGNOSIS — Q14.8: ICD-10-CM

## 2018-09-20 DIAGNOSIS — H01.001: ICD-10-CM

## 2018-09-20 DIAGNOSIS — H01.005: ICD-10-CM

## 2018-09-20 DIAGNOSIS — H01.002: ICD-10-CM

## 2018-09-20 DIAGNOSIS — H35.3211: ICD-10-CM

## 2018-09-20 DIAGNOSIS — H35.81: ICD-10-CM

## 2018-09-20 DIAGNOSIS — H43.813: ICD-10-CM

## 2018-09-20 DIAGNOSIS — H01.004: ICD-10-CM

## 2018-09-20 PROCEDURE — 92014 COMPRE OPH EXAM EST PT 1/>: CPT | Performed by: OPHTHALMOLOGY

## 2018-09-20 PROCEDURE — 92134 CPTRZ OPH DX IMG PST SGM RTA: CPT | Performed by: OPHTHALMOLOGY

## 2018-09-20 ASSESSMENT — SUPERFICIAL PUNCTATE KERATITIS (SPK)
OD_SPK: T
OS_SPK: T

## 2018-09-20 ASSESSMENT — LID EXAM ASSESSMENTS
OD_COMMENTS: 1+MGD
OS_BLEPHARITIS: 1+
OS_COMMENTS: 1+MGD
OD_BLEPHARITIS: 1+

## 2018-09-20 ASSESSMENT — CONFRONTATIONAL VISUAL FIELD TEST (CVF)
OS_FINDINGS: FULL
OD_FINDINGS: FULL

## 2018-09-20 ASSESSMENT — PUNCTA - ASSESSMENT: OD_PUNCTA: SIL PLUG RLL

## 2018-09-21 ASSESSMENT — REFRACTION_CURRENTRX
OD_OVR_VA: 20/
OS_OVR_VA: 20/
OS_OVR_VA: 20/
OD_OVR_VA: 20/
OD_OVR_VA: 20/
OS_OVR_VA: 20/

## 2018-09-21 ASSESSMENT — REFRACTION_AUTOREFRACTION
OS_SPHERE: +1.25
OS_CYLINDER: -1.00
OD_CYLINDER: -1.25
OS_AXIS: 100
OD_SPHERE: +0.50
OD_AXIS: 114

## 2018-09-21 ASSESSMENT — REFRACTION_MANIFEST
OS_VA2: 20/
OS_VA3: 20/
OD_VA3: 20/
OD_VA1: 20/
OS_VA1: 20/
OD_VA2: 20/
OS_VA2: 20/
OD_VA1: 20/
OU_VA: 20/
OS_VA1: 20/
OD_VA3: 20/
OU_VA: 20/
OD_VA2: 20/
OS_VA3: 20/

## 2018-09-21 ASSESSMENT — SPHEQUIV_DERIVED
OS_SPHEQUIV: 0.75
OD_SPHEQUIV: -0.125

## 2018-09-21 ASSESSMENT — VISUAL ACUITY
OS_BCVA: 20/30-2
OD_BCVA: 20/25-2

## 2018-10-25 ENCOUNTER — DOCTOR'S OFFICE (OUTPATIENT)
Dept: URBAN - METROPOLITAN AREA CLINIC 136 | Facility: CLINIC | Age: 83
Setting detail: OPHTHALMOLOGY
End: 2018-10-25
Payer: COMMERCIAL

## 2018-10-25 DIAGNOSIS — H01.002: ICD-10-CM

## 2018-10-25 DIAGNOSIS — H00.14: ICD-10-CM

## 2018-10-25 DIAGNOSIS — H01.004: ICD-10-CM

## 2018-10-25 DIAGNOSIS — H01.001: ICD-10-CM

## 2018-10-25 DIAGNOSIS — H01.005: ICD-10-CM

## 2018-10-25 PROCEDURE — 99213 OFFICE O/P EST LOW 20 MIN: CPT | Performed by: OPTOMETRIST

## 2018-10-25 ASSESSMENT — PUNCTA - ASSESSMENT: OD_PUNCTA: SIL PLUG RLL

## 2018-10-25 ASSESSMENT — CONFRONTATIONAL VISUAL FIELD TEST (CVF)
OS_FINDINGS: FULL
OD_FINDINGS: FULL

## 2018-10-25 ASSESSMENT — REFRACTION_MANIFEST
OS_VA2: 20/
OD_VA1: 20/
OD_VA1: 20/
OD_VA3: 20/
OS_VA1: 20/
OD_VA2: 20/
OD_VA3: 20/
OU_VA: 20/
OS_VA2: 20/
OS_VA1: 20/
OU_VA: 20/
OS_VA3: 20/
OD_VA2: 20/
OS_VA3: 20/

## 2018-10-25 ASSESSMENT — REFRACTION_CURRENTRX
OS_OVR_VA: 20/
OD_OVR_VA: 20/
OS_OVR_VA: 20/
OD_OVR_VA: 20/
OD_OVR_VA: 20/
OS_OVR_VA: 20/

## 2018-10-25 ASSESSMENT — REFRACTION_AUTOREFRACTION
OD_CYLINDER: -1.25
OS_CYLINDER: -1.00
OS_AXIS: 100
OD_AXIS: 114
OD_SPHERE: +0.50
OS_SPHERE: +1.25

## 2018-10-25 ASSESSMENT — SPHEQUIV_DERIVED
OD_SPHEQUIV: -0.125
OS_SPHEQUIV: 0.75

## 2018-10-25 ASSESSMENT — LID EXAM ASSESSMENTS
OD_COMMENTS: 1+MGD
OD_BLEPHARITIS: 1+
OS_COMMENTS: 1+MGD
OS_BLEPHARITIS: 1+

## 2018-10-25 ASSESSMENT — VISUAL ACUITY
OD_BCVA: 20/30
OS_BCVA: 20/25-2

## 2018-11-29 ENCOUNTER — DOCTOR'S OFFICE (OUTPATIENT)
Dept: URBAN - METROPOLITAN AREA CLINIC 136 | Facility: CLINIC | Age: 83
Setting detail: OPHTHALMOLOGY
End: 2018-11-29
Payer: COMMERCIAL

## 2018-11-29 DIAGNOSIS — H43.813: ICD-10-CM

## 2018-11-29 DIAGNOSIS — Q14.8: ICD-10-CM

## 2018-11-29 DIAGNOSIS — H35.3211: ICD-10-CM

## 2018-11-29 DIAGNOSIS — H35.81: ICD-10-CM

## 2018-11-29 PROCEDURE — 92014 COMPRE OPH EXAM EST PT 1/>: CPT | Performed by: OPHTHALMOLOGY

## 2018-11-29 PROCEDURE — 92134 CPTRZ OPH DX IMG PST SGM RTA: CPT | Performed by: OPHTHALMOLOGY

## 2018-11-29 ASSESSMENT — SUPERFICIAL PUNCTATE KERATITIS (SPK)
OD_SPK: T
OS_SPK: T

## 2018-11-29 ASSESSMENT — CONFRONTATIONAL VISUAL FIELD TEST (CVF)
OS_FINDINGS: FULL
OD_FINDINGS: FULL

## 2018-11-29 ASSESSMENT — LID EXAM ASSESSMENTS
OD_COMMENTS: 1+MGD
OS_COMMENTS: 1+MGD
OD_BLEPHARITIS: 1+
OS_BLEPHARITIS: 1+

## 2018-11-29 ASSESSMENT — PUNCTA - ASSESSMENT: OD_PUNCTA: SIL PLUG RLL

## 2018-11-30 PROBLEM — H01.005 BLEPHARITIS; RIGHT UPPER LID, RIGHT LOWER LID, LEFT UPPER LID, LEFT LOWER LID: Status: ACTIVE | Noted: 2018-06-14

## 2018-11-30 PROBLEM — G43.809 OCULAR MIGRAINE W/OUT INTRACTABLE ; BOTH EYES: Status: ACTIVE | Noted: 2017-02-28

## 2018-11-30 PROBLEM — H01.001 BLEPHARITIS; RIGHT UPPER LID, RIGHT LOWER LID, LEFT UPPER LID, LEFT LOWER LID: Status: ACTIVE | Noted: 2018-06-14

## 2018-11-30 PROBLEM — Q14.8: Status: ACTIVE | Noted: 2017-09-26

## 2018-11-30 PROBLEM — H40.011: Status: ACTIVE | Noted: 2017-02-28

## 2018-11-30 PROBLEM — Z96.1: Status: ACTIVE | Noted: 2017-07-24

## 2018-11-30 PROBLEM — H04.121 DRY EYE; RIGHT EYE, LEFT EYE: Status: ACTIVE | Noted: 2017-02-28

## 2018-11-30 PROBLEM — H40.012: Status: ACTIVE | Noted: 2017-02-28

## 2018-11-30 PROBLEM — H04.122 DRY EYE; RIGHT EYE, LEFT EYE: Status: ACTIVE | Noted: 2017-02-28

## 2018-11-30 PROBLEM — H01.002 BLEPHARITIS; RIGHT UPPER LID, RIGHT LOWER LID, LEFT UPPER LID, LEFT LOWER LID: Status: ACTIVE | Noted: 2018-06-14

## 2018-11-30 PROBLEM — H35.81 RETINAL EDEMA ; LEFT EYE: Status: ACTIVE | Noted: 2017-05-22

## 2018-11-30 PROBLEM — H01.004 BLEPHARITIS; RIGHT UPPER LID, RIGHT LOWER LID, LEFT UPPER LID, LEFT LOWER LID: Status: ACTIVE | Noted: 2018-06-14

## 2018-11-30 PROBLEM — H43.813 POST VITREOUS DETACHMENT; BOTH EYES: Status: ACTIVE | Noted: 2017-02-28

## 2018-11-30 PROBLEM — H35.3211 ARMD WET; RIGHT EYE ACTIVE NEOVASCULARIZATION: Status: ACTIVE | Noted: 2018-03-29

## 2018-11-30 PROBLEM — H00.14 CHALAZION; LEFT UPPER LID: Status: ACTIVE | Noted: 2018-06-14

## 2018-11-30 ASSESSMENT — REFRACTION_MANIFEST
OU_VA: 20/
OD_VA3: 20/
OS_VA3: 20/
OD_VA2: 20/
OD_VA1: 20/
OS_VA2: 20/
OS_VA1: 20/
OD_VA2: 20/
OS_VA1: 20/
OD_VA1: 20/
OD_VA3: 20/
OS_VA3: 20/
OS_VA2: 20/
OU_VA: 20/

## 2018-11-30 ASSESSMENT — REFRACTION_AUTOREFRACTION
OD_SPHERE: +0.50
OS_AXIS: 100
OD_CYLINDER: -1.25
OD_AXIS: 114
OS_SPHERE: +1.25
OS_CYLINDER: -1.00

## 2018-11-30 ASSESSMENT — REFRACTION_CURRENTRX
OS_OVR_VA: 20/
OD_OVR_VA: 20/
OD_OVR_VA: 20/
OS_OVR_VA: 20/
OS_OVR_VA: 20/
OD_OVR_VA: 20/

## 2018-11-30 ASSESSMENT — SPHEQUIV_DERIVED
OS_SPHEQUIV: 0.75
OD_SPHEQUIV: -0.125

## 2018-11-30 ASSESSMENT — VISUAL ACUITY
OS_BCVA: 20/25-2
OD_BCVA: 20/30+2

## 2019-03-28 ENCOUNTER — OFFICE VISIT (OUTPATIENT)
Dept: FAMILY MEDICINE CLINIC | Facility: CLINIC | Age: 84
End: 2019-03-28
Payer: MEDICARE

## 2019-03-28 VITALS
HEIGHT: 60 IN | TEMPERATURE: 98.2 F | DIASTOLIC BLOOD PRESSURE: 84 MMHG | RESPIRATION RATE: 12 BRPM | WEIGHT: 152.5 LBS | SYSTOLIC BLOOD PRESSURE: 148 MMHG | HEART RATE: 77 BPM | BODY MASS INDEX: 29.94 KG/M2 | OXYGEN SATURATION: 94 %

## 2019-03-28 DIAGNOSIS — E78.2 MIXED HYPERLIPIDEMIA: ICD-10-CM

## 2019-03-28 DIAGNOSIS — J40 BRONCHITIS: Primary | ICD-10-CM

## 2019-03-28 DIAGNOSIS — I10 ESSENTIAL HYPERTENSION: ICD-10-CM

## 2019-03-28 PROCEDURE — 99203 OFFICE O/P NEW LOW 30 MIN: CPT | Performed by: FAMILY MEDICINE

## 2019-03-28 RX ORDER — ASPIRIN 81 MG/1
81 TABLET ORAL DAILY
COMMUNITY

## 2019-03-28 RX ORDER — CITALOPRAM 20 MG/1
TABLET ORAL
COMMUNITY
Start: 2019-02-04 | End: 2021-05-11 | Stop reason: SDUPTHER

## 2019-03-28 RX ORDER — CEFUROXIME AXETIL 500 MG/1
500 TABLET ORAL EVERY 12 HOURS SCHEDULED
Qty: 20 TABLET | Refills: 0 | Status: SHIPPED | OUTPATIENT
Start: 2019-03-28 | End: 2019-04-07

## 2019-03-28 RX ORDER — TIOTROPIUM BROMIDE INHALATION SPRAY 3.12 UG/1
SPRAY, METERED RESPIRATORY (INHALATION)
COMMUNITY
Start: 2019-01-22 | End: 2019-12-12 | Stop reason: SDUPTHER

## 2019-03-28 RX ORDER — LOSARTAN POTASSIUM 100 MG/1
TABLET ORAL
Refills: 3 | COMMUNITY
Start: 2019-03-26 | End: 2021-02-19 | Stop reason: SDUPTHER

## 2019-03-28 RX ORDER — SIMVASTATIN 20 MG
TABLET ORAL
COMMUNITY
Start: 2019-03-05 | End: 2019-08-22 | Stop reason: SDUPTHER

## 2019-03-28 RX ORDER — ZOLEDRONIC ACID 5 MG/100ML
5 INJECTION, SOLUTION INTRAVENOUS ONCE
COMMUNITY

## 2019-03-28 RX ORDER — MAG HYDROX/ALUMINUM HYD/SIMETH 400-400-40
SUSPENSION, ORAL (FINAL DOSE FORM) ORAL
COMMUNITY

## 2019-03-28 RX ORDER — MONTELUKAST SODIUM 10 MG/1
10 TABLET ORAL
Qty: 30 TABLET | Refills: 1 | Status: SHIPPED | OUTPATIENT
Start: 2019-03-28 | End: 2019-05-01 | Stop reason: ALTCHOICE

## 2019-03-28 RX ORDER — METOPROLOL SUCCINATE 100 MG/1
TABLET, EXTENDED RELEASE ORAL
COMMUNITY
Start: 2019-01-10 | End: 2020-02-26 | Stop reason: SDUPTHER

## 2019-03-28 RX ORDER — LEVOTHYROXINE SODIUM 0.03 MG/1
TABLET ORAL
COMMUNITY
Start: 2019-02-11 | End: 2020-05-20 | Stop reason: SDUPTHER

## 2019-03-29 NOTE — PROGRESS NOTES
Assessment/Plan:  Patient is an 26-year-old female who has not been seen in our office for many years  She is here to reestablish in to be treated for respiratory infection she has had cough 3 weeks  She does have a Spiriva inhaler although she  could not tell exactly why she was prescribed this  She does have a smoking history and so I feel she must have a history of COPD but I do not old records available to me at this time  I did start her on an antibiotic and I gave her a prescription for generic Singulair  I will be seeing her back and by then hopefully old records so I can review her medical history  She did tell me that she suffered a blood clot to her lungs about 2 years ago  She was driving but could not remember where she was going even have she had got in her car  She has not driven since then  She does take medication for blood pressure and cholesterol  Diagnoses and all orders for this visit:    Bronchitis  -     cefuroxime (CEFTIN) 500 mg tablet; Take 1 tablet (500 mg total) by mouth every 12 (twelve) hours for 10 days  -     montelukast (SINGULAIR) 10 mg tablet; Take 1 tablet (10 mg total) by mouth daily at bedtime    Essential hypertension    Mixed hyperlipidemia    Other orders  -     losartan (COZAAR) 100 MG tablet  -     metoprolol succinate (TOPROL-XL) 100 mg 24 hr tablet  -     simvastatin (ZOCOR) 20 mg tablet  -     citalopram (CeleXA) 20 mg tablet  -     levothyroxine 25 mcg tablet  -     SPIRIVA RESPIMAT 2 5 MCG/ACT AERS inhaler  -     hydrocortisone 2 5 % cream  -     PROAIR  (90 Base) MCG/ACT inhaler  -     zoledronic acid (RECLAST) 5 mg/100 mL IV infusion (premix); Infuse 5 mg into a venous catheter once  -     Cholecalciferol (VITAMIN D3) 5000 units CAPS; Take by mouth  -     aspirin (ECOTRIN LOW STRENGTH) 81 mg EC tablet;  Take 81 mg by mouth daily          Subjective:   Chief Complaint   Patient presents with    Cold Like Symptoms     Pt c/o productive cough with SOB and chest tightness x3-4 weeks  Pt was prescribed tesslon pearls when sxs first started  Pt was seen at [facility] and diagnosed with Bronchitis 3-4 weeks ago  Patient ID: Adrián Golden is a 82 y o  female  Patient is here for cough congestion  She was seen in Urgent Care [facility]  - treated for bronchitis and given Tessalon Perles  Patient had a blood clot two years ago - started on Spiriva  She had been a patient many years ago in our office but has since switched to a different practice  She would now like to re-establish year also  The following portions of the patient's history were reviewed and updated as appropriate: allergies, current medications, past family history, past medical history, past social history, past surgical history and problem list     Review of Systems   Constitutional: Negative for chills and fever  HENT: Positive for postnasal drip  Negative for congestion, ear pain and sore throat  Respiratory: Positive for cough  Negative for chest tightness  Cardiovascular: Negative for chest pain and palpitations  Gastrointestinal: Negative for abdominal pain, constipation, diarrhea and nausea  Genitourinary: Negative for difficulty urinating  Skin: Negative  Neurological: Negative for dizziness and headaches  Psychiatric/Behavioral: Negative  Objective:      /84 (BP Location: Left arm, Patient Position: Sitting, Cuff Size: Adult)   Pulse 77   Temp 98 2 °F (36 8 °C) (Tympanic)   Resp 12   Ht 5' 0 43" (1 535 m)   Wt 69 2 kg (152 lb 8 oz)   SpO2 94%   BMI 29 36 kg/m²          Physical Exam   Constitutional: She is oriented to person, place, and time  She appears well-developed  No distress  HENT:   TMs okay  Throat mildly inflamed  Neck: Carotid bruit is not present  No thyromegaly present  Cardiovascular: Normal rate, regular rhythm and normal heart sounds  Pulmonary/Chest: Effort normal  She has wheezes     Musculoskeletal: She exhibits no edema  Lymphadenopathy:     She has no cervical adenopathy  Neurological: She is alert and oriented to person, place, and time  Skin: Skin is warm and dry  Psychiatric: She has a normal mood and affect  Nursing note and vitals reviewed

## 2019-04-02 ENCOUNTER — TELEPHONE (OUTPATIENT)
Dept: FAMILY MEDICINE CLINIC | Facility: CLINIC | Age: 84
End: 2019-04-02

## 2019-04-02 DIAGNOSIS — J40 BRONCHITIS: Primary | ICD-10-CM

## 2019-04-02 RX ORDER — PROMETHAZINE HYDROCHLORIDE AND CODEINE PHOSPHATE 6.25; 1 MG/5ML; MG/5ML
5 SYRUP ORAL EVERY 4 HOURS PRN
Qty: 120 ML | Refills: 0 | Status: SHIPPED | OUTPATIENT
Start: 2019-04-02 | End: 2019-05-01 | Stop reason: ALTCHOICE

## 2019-04-02 RX ORDER — PREDNISONE 1 MG/1
TABLET ORAL
Qty: 30 TABLET | Refills: 0 | Status: SHIPPED | OUTPATIENT
Start: 2019-04-02 | End: 2019-05-01 | Stop reason: ALTCHOICE

## 2019-05-01 ENCOUNTER — APPOINTMENT (OUTPATIENT)
Dept: RADIOLOGY | Facility: CLINIC | Age: 84
End: 2019-05-01
Payer: MEDICARE

## 2019-05-01 ENCOUNTER — OFFICE VISIT (OUTPATIENT)
Dept: FAMILY MEDICINE CLINIC | Facility: CLINIC | Age: 84
End: 2019-05-01
Payer: MEDICARE

## 2019-05-01 VITALS
SYSTOLIC BLOOD PRESSURE: 130 MMHG | WEIGHT: 151.6 LBS | OXYGEN SATURATION: 97 % | BODY MASS INDEX: 29.76 KG/M2 | RESPIRATION RATE: 16 BRPM | TEMPERATURE: 97 F | HEIGHT: 60 IN | HEART RATE: 68 BPM | DIASTOLIC BLOOD PRESSURE: 82 MMHG

## 2019-05-01 DIAGNOSIS — R05.9 COUGH: ICD-10-CM

## 2019-05-01 DIAGNOSIS — E78.2 MIXED HYPERLIPIDEMIA: Primary | ICD-10-CM

## 2019-05-01 DIAGNOSIS — L98.9 SKIN LESION: ICD-10-CM

## 2019-05-01 DIAGNOSIS — E03.9 ACQUIRED HYPOTHYROIDISM: ICD-10-CM

## 2019-05-01 DIAGNOSIS — I10 ESSENTIAL HYPERTENSION: ICD-10-CM

## 2019-05-01 DIAGNOSIS — R25.1 TREMOR: ICD-10-CM

## 2019-05-01 DIAGNOSIS — M25.511 ACUTE PAIN OF RIGHT SHOULDER: ICD-10-CM

## 2019-05-01 PROBLEM — J43.2 CENTRILOBULAR EMPHYSEMA (HCC): Status: ACTIVE | Noted: 2017-05-10

## 2019-05-01 PROBLEM — I51.89 DIASTOLIC DYSFUNCTION: Status: ACTIVE | Noted: 2017-05-10

## 2019-05-01 PROBLEM — I26.99 PULMONARY EMBOLISM (HCC): Status: ACTIVE | Noted: 2017-08-07

## 2019-05-01 PROCEDURE — 99214 OFFICE O/P EST MOD 30 MIN: CPT | Performed by: FAMILY MEDICINE

## 2019-05-01 PROCEDURE — 73030 X-RAY EXAM OF SHOULDER: CPT

## 2019-06-21 ENCOUNTER — TELEPHONE (OUTPATIENT)
Dept: FAMILY MEDICINE CLINIC | Facility: CLINIC | Age: 84
End: 2019-06-21

## 2019-07-01 ENCOUNTER — APPOINTMENT (OUTPATIENT)
Dept: LAB | Facility: CLINIC | Age: 84
End: 2019-07-01
Payer: MEDICARE

## 2019-07-01 DIAGNOSIS — E03.9 ACQUIRED HYPOTHYROIDISM: ICD-10-CM

## 2019-07-01 DIAGNOSIS — E78.2 MIXED HYPERLIPIDEMIA: ICD-10-CM

## 2019-07-01 LAB
ALBUMIN SERPL BCP-MCNC: 3.5 G/DL (ref 3.5–5)
ALP SERPL-CCNC: 49 U/L (ref 46–116)
ALT SERPL W P-5'-P-CCNC: 24 U/L (ref 12–78)
ANION GAP SERPL CALCULATED.3IONS-SCNC: 4 MMOL/L (ref 4–13)
AST SERPL W P-5'-P-CCNC: 15 U/L (ref 5–45)
BILIRUB SERPL-MCNC: 0.71 MG/DL (ref 0.2–1)
BUN SERPL-MCNC: 19 MG/DL (ref 5–25)
CALCIUM SERPL-MCNC: 8.9 MG/DL (ref 8.3–10.1)
CHLORIDE SERPL-SCNC: 107 MMOL/L (ref 100–108)
CHOLEST SERPL-MCNC: 152 MG/DL (ref 50–200)
CO2 SERPL-SCNC: 30 MMOL/L (ref 21–32)
CREAT SERPL-MCNC: 0.89 MG/DL (ref 0.6–1.3)
GFR SERPL CREATININE-BSD FRML MDRD: 61 ML/MIN/1.73SQ M
GLUCOSE P FAST SERPL-MCNC: 95 MG/DL (ref 65–99)
HDLC SERPL-MCNC: 63 MG/DL (ref 40–60)
LDLC SERPL CALC-MCNC: 73 MG/DL (ref 0–100)
POTASSIUM SERPL-SCNC: 4.1 MMOL/L (ref 3.5–5.3)
PROT SERPL-MCNC: 6.6 G/DL (ref 6.4–8.2)
SODIUM SERPL-SCNC: 141 MMOL/L (ref 136–145)
TRIGL SERPL-MCNC: 80 MG/DL
TSH SERPL DL<=0.05 MIU/L-ACNC: 2.77 UIU/ML (ref 0.36–3.74)

## 2019-07-01 PROCEDURE — 36415 COLL VENOUS BLD VENIPUNCTURE: CPT

## 2019-07-01 PROCEDURE — 84443 ASSAY THYROID STIM HORMONE: CPT

## 2019-07-01 PROCEDURE — 80053 COMPREHEN METABOLIC PANEL: CPT

## 2019-07-01 PROCEDURE — 80061 LIPID PANEL: CPT

## 2019-08-15 ENCOUNTER — OFFICE VISIT (OUTPATIENT)
Dept: FAMILY MEDICINE CLINIC | Facility: CLINIC | Age: 84
End: 2019-08-15
Payer: MEDICARE

## 2019-08-15 VITALS
DIASTOLIC BLOOD PRESSURE: 74 MMHG | TEMPERATURE: 97.4 F | RESPIRATION RATE: 16 BRPM | SYSTOLIC BLOOD PRESSURE: 138 MMHG | HEART RATE: 65 BPM | OXYGEN SATURATION: 90 % | WEIGHT: 148.2 LBS | BODY MASS INDEX: 27.98 KG/M2 | HEIGHT: 61 IN

## 2019-08-15 DIAGNOSIS — R25.1 TREMOR: ICD-10-CM

## 2019-08-15 DIAGNOSIS — I10 ESSENTIAL HYPERTENSION: Primary | ICD-10-CM

## 2019-08-15 DIAGNOSIS — E03.9 ACQUIRED HYPOTHYROIDISM: ICD-10-CM

## 2019-08-15 DIAGNOSIS — J43.2 CENTRILOBULAR EMPHYSEMA (HCC): ICD-10-CM

## 2019-08-15 DIAGNOSIS — E78.2 MIXED HYPERLIPIDEMIA: ICD-10-CM

## 2019-08-15 PROCEDURE — 99214 OFFICE O/P EST MOD 30 MIN: CPT | Performed by: FAMILY MEDICINE

## 2019-08-15 NOTE — PROGRESS NOTES
Assessment/Plan:  Patient is an 82year old female seen for follow up of chronic medical conditions  She recently returned to our practice for her medical care  1  Essential hypertension  Blood pressure is controlled with medication   - Comprehensive metabolic panel; Future  - CBC and differential; Future  - TSH, 3rd generation with Free T4 reflex; Future    2  Mixed hyperlipidemia  Cholesterol was 152 and triglycerides 80    - Lipid Panel with Direct LDL reflex; Future  - Comprehensive metabolic panel; Future  - CBC and differential; Future    3  Acquired hypothyroidism  TSH is in range and she will continue present dose of levothyroxine   - Comprehensive metabolic panel; Future  - CBC and differential; Future  - TSH, 3rd generation with Free T4 reflex; Future    4  Tremor  Patient is notice tremor of hands  No other tremors  It does not appear very noticeable at this time  I told her we could refer her to Neurology but she wants to hold off     5  Centrilobular emphysema ([facility] [address] )  Patient with history of emphysema but she denies any problems with breathing  Diagnoses and all orders for this visit:    Essential hypertension  -     Comprehensive metabolic panel; Future  -     CBC and differential; Future  -     TSH, 3rd generation with Free T4 reflex; Future    Mixed hyperlipidemia  -     Lipid Panel with Direct LDL reflex; Future  -     Comprehensive metabolic panel; Future  -     CBC and differential; Future    Acquired hypothyroidism  -     Comprehensive metabolic panel; Future  -     CBC and differential; Future  -     TSH, 3rd generation with Free T4 reflex; Future    Tremor    Centrilobular emphysema (HCC)          Subjective:   Chief Complaint   Patient presents with    Follow-up     Pt presents for a 3 month f/u and review BW results from 7/01/2019  Pt wants to discuss body tremors very often  Patient ID: Harrell Lennox is a 82 y o  female      Miguel Looney is here for follow up of chronic medical conditions  She did have fasting blood work  She does have tremors of both hands and feels it inside  Does not have any head tremors  She just returned from [place]  The following portions of the patient's history were reviewed and updated as appropriate: allergies, current medications, past family history, past medical history, past social history, past surgical history and problem list     Review of Systems   Constitutional: Negative for chills and fever  HENT: Negative for congestion and sore throat  Respiratory: Negative for chest tightness  Cardiovascular: Negative for chest pain and palpitations  Gastrointestinal: Negative for abdominal pain, constipation, diarrhea and nausea  Genitourinary: Negative for difficulty urinating  Skin: Negative  Neurological: Negative for dizziness and headaches  Tremor   Psychiatric/Behavioral: Negative  Objective:      /80 (BP Location: Left arm, Patient Position: Sitting, Cuff Size: Large)   Pulse 65   Temp (!) 97 4 °F (36 3 °C) (Tympanic)   Resp 16   Ht 5' 1 02" (1 55 m)   Wt 67 2 kg (148 lb 3 2 oz)   SpO2 90%   BMI 27 98 kg/m²          Physical Exam   Constitutional: She is oriented to person, place, and time  She appears well-developed  No distress  Neck: Carotid bruit is not present  No thyromegaly present  Cardiovascular: Normal rate, regular rhythm and normal heart sounds  /80 when I retook it  Pulmonary/Chest: Effort normal and breath sounds normal    Musculoskeletal: She exhibits no edema  Lymphadenopathy:     She has no cervical adenopathy  Neurological: She is alert and oriented to person, place, and time  Skin: Skin is warm and dry  Healing skin tears on legs - daughter's dog  Psychiatric: She has a normal mood and affect  Nursing note and vitals reviewed

## 2019-08-22 DIAGNOSIS — E78.2 MIXED HYPERLIPIDEMIA: ICD-10-CM

## 2019-08-22 DIAGNOSIS — I10 ESSENTIAL HYPERTENSION: Primary | ICD-10-CM

## 2019-08-23 RX ORDER — SIMVASTATIN 20 MG
20 TABLET ORAL
Qty: 90 TABLET | Refills: 1 | Status: SHIPPED | OUTPATIENT
Start: 2019-08-23 | End: 2021-04-05 | Stop reason: SDUPTHER

## 2019-12-12 DIAGNOSIS — J43.2 CENTRILOBULAR EMPHYSEMA (HCC): Primary | ICD-10-CM

## 2019-12-13 RX ORDER — TIOTROPIUM BROMIDE INHALATION SPRAY 3.12 UG/1
2 SPRAY, METERED RESPIRATORY (INHALATION) DAILY
Qty: 2 INHALER | Refills: 3 | Status: SHIPPED | OUTPATIENT
Start: 2019-12-13 | End: 2020-09-04 | Stop reason: SDUPTHER

## 2020-01-16 ENCOUNTER — APPOINTMENT (OUTPATIENT)
Dept: LAB | Facility: CLINIC | Age: 85
End: 2020-01-16
Payer: MEDICARE

## 2020-01-16 DIAGNOSIS — E03.9 ACQUIRED HYPOTHYROIDISM: ICD-10-CM

## 2020-01-16 DIAGNOSIS — I10 ESSENTIAL HYPERTENSION: ICD-10-CM

## 2020-01-16 DIAGNOSIS — E78.2 MIXED HYPERLIPIDEMIA: ICD-10-CM

## 2020-01-16 LAB
ALBUMIN SERPL BCP-MCNC: 3.3 G/DL (ref 3.5–5)
ALP SERPL-CCNC: 47 U/L (ref 46–116)
ALT SERPL W P-5'-P-CCNC: 21 U/L (ref 12–78)
ANION GAP SERPL CALCULATED.3IONS-SCNC: 3 MMOL/L (ref 4–13)
AST SERPL W P-5'-P-CCNC: 13 U/L (ref 5–45)
BASOPHILS # BLD AUTO: 0.05 THOUSANDS/ΜL (ref 0–0.1)
BASOPHILS NFR BLD AUTO: 1 % (ref 0–1)
BILIRUB SERPL-MCNC: 0.63 MG/DL (ref 0.2–1)
BUN SERPL-MCNC: 17 MG/DL (ref 5–25)
CALCIUM SERPL-MCNC: 8.9 MG/DL (ref 8.3–10.1)
CHLORIDE SERPL-SCNC: 108 MMOL/L (ref 100–108)
CHOLEST SERPL-MCNC: 156 MG/DL (ref 50–200)
CO2 SERPL-SCNC: 29 MMOL/L (ref 21–32)
CREAT SERPL-MCNC: 0.83 MG/DL (ref 0.6–1.3)
EOSINOPHIL # BLD AUTO: 0.32 THOUSAND/ΜL (ref 0–0.61)
EOSINOPHIL NFR BLD AUTO: 5 % (ref 0–6)
ERYTHROCYTE [DISTWIDTH] IN BLOOD BY AUTOMATED COUNT: 13.5 % (ref 11.6–15.1)
GFR SERPL CREATININE-BSD FRML MDRD: 66 ML/MIN/1.73SQ M
GLUCOSE P FAST SERPL-MCNC: 96 MG/DL (ref 65–99)
HCT VFR BLD AUTO: 44.7 % (ref 34.8–46.1)
HDLC SERPL-MCNC: 67 MG/DL
HGB BLD-MCNC: 14.2 G/DL (ref 11.5–15.4)
IMM GRANULOCYTES # BLD AUTO: 0.01 THOUSAND/UL (ref 0–0.2)
IMM GRANULOCYTES NFR BLD AUTO: 0 % (ref 0–2)
LDLC SERPL CALC-MCNC: 73 MG/DL (ref 0–100)
LYMPHOCYTES # BLD AUTO: 1.56 THOUSANDS/ΜL (ref 0.6–4.47)
LYMPHOCYTES NFR BLD AUTO: 25 % (ref 14–44)
MCH RBC QN AUTO: 29.5 PG (ref 26.8–34.3)
MCHC RBC AUTO-ENTMCNC: 31.8 G/DL (ref 31.4–37.4)
MCV RBC AUTO: 93 FL (ref 82–98)
MONOCYTES # BLD AUTO: 0.65 THOUSAND/ΜL (ref 0.17–1.22)
MONOCYTES NFR BLD AUTO: 10 % (ref 4–12)
NEUTROPHILS # BLD AUTO: 3.68 THOUSANDS/ΜL (ref 1.85–7.62)
NEUTS SEG NFR BLD AUTO: 59 % (ref 43–75)
NRBC BLD AUTO-RTO: 0 /100 WBCS
PLATELET # BLD AUTO: 216 THOUSANDS/UL (ref 149–390)
PMV BLD AUTO: 11.4 FL (ref 8.9–12.7)
POTASSIUM SERPL-SCNC: 4.2 MMOL/L (ref 3.5–5.3)
PROT SERPL-MCNC: 6.5 G/DL (ref 6.4–8.2)
RBC # BLD AUTO: 4.81 MILLION/UL (ref 3.81–5.12)
SODIUM SERPL-SCNC: 140 MMOL/L (ref 136–145)
TRIGL SERPL-MCNC: 79 MG/DL
TSH SERPL DL<=0.05 MIU/L-ACNC: 3.4 UIU/ML (ref 0.36–3.74)
WBC # BLD AUTO: 6.27 THOUSAND/UL (ref 4.31–10.16)

## 2020-01-16 PROCEDURE — 80053 COMPREHEN METABOLIC PANEL: CPT

## 2020-01-16 PROCEDURE — 80061 LIPID PANEL: CPT

## 2020-01-16 PROCEDURE — 36415 COLL VENOUS BLD VENIPUNCTURE: CPT

## 2020-01-16 PROCEDURE — 85025 COMPLETE CBC W/AUTO DIFF WBC: CPT

## 2020-01-16 PROCEDURE — 84443 ASSAY THYROID STIM HORMONE: CPT

## 2020-01-28 RX ORDER — ACETAMINOPHEN 500 MG
1000 TABLET ORAL EVERY 6 HOURS PRN
COMMUNITY
Start: 2020-01-23 | End: 2020-02-02

## 2020-01-29 ENCOUNTER — OFFICE VISIT (OUTPATIENT)
Dept: FAMILY MEDICINE CLINIC | Facility: CLINIC | Age: 85
End: 2020-01-29
Payer: MEDICARE

## 2020-01-29 ENCOUNTER — TRANSITIONAL CARE MANAGEMENT (OUTPATIENT)
Dept: FAMILY MEDICINE CLINIC | Facility: CLINIC | Age: 85
End: 2020-01-29

## 2020-01-29 VITALS
DIASTOLIC BLOOD PRESSURE: 78 MMHG | RESPIRATION RATE: 17 BRPM | SYSTOLIC BLOOD PRESSURE: 130 MMHG | BODY MASS INDEX: 27.97 KG/M2 | OXYGEN SATURATION: 97 % | HEART RATE: 84 BPM | HEIGHT: 62 IN | WEIGHT: 152 LBS | TEMPERATURE: 97.1 F

## 2020-01-29 DIAGNOSIS — T14.8XXA PUNCTURE WOUND: ICD-10-CM

## 2020-01-29 DIAGNOSIS — J43.2 CENTRILOBULAR EMPHYSEMA (HCC): ICD-10-CM

## 2020-01-29 DIAGNOSIS — R06.02 SHORTNESS OF BREATH: ICD-10-CM

## 2020-01-29 DIAGNOSIS — R73.9 ELEVATED BLOOD SUGAR: ICD-10-CM

## 2020-01-29 DIAGNOSIS — T14.8XXA HEMATOMA AND CONTUSION: Primary | ICD-10-CM

## 2020-01-29 LAB — SL AMB POCT HEMOGLOBIN AIC: 5.7 (ref ?–6.5)

## 2020-01-29 PROCEDURE — 99496 TRANSJ CARE MGMT HIGH F2F 7D: CPT | Performed by: FAMILY MEDICINE

## 2020-01-29 PROCEDURE — 83036 HEMOGLOBIN GLYCOSYLATED A1C: CPT | Performed by: FAMILY MEDICINE

## 2020-01-29 NOTE — PROGRESS NOTES
Assessment/Plan:    Patient is an 82year-old seen for follow-up to hospitalization  She was trying to clean something with a knife and it slipped and punctured her left radial artery  She was seen in the emergency room at [facility] and was admitted because of a large hematoma  She was discharged on the 23rd of this month  The hematoma is still present as is the discoloration of her hand and forearm  Her capillary refill is good her strength is good and sensation is intact  Patient also complains of fatigue and shortness of breath which she had way before this incident  She has not had an echo or PFT since 2016 and so I am going to order those tests  Diagnoses and all orders for this visit:    Hematoma and contusion    Puncture wound    Shortness of breath  -     Complete PFT with post bronchodilator; Future  -     Echo complete with contrast if indicated; Future    Elevated blood sugar  -     POCT hemoglobin A1c    Centrilobular emphysema (HCC)          Subjective:   Chief Complaint   Patient presents with    Transition of Care Management     LVH cut wrist        Patient ID: Daphnie Morales is a 82 y o  female  TCM Call (since 12/29/2019)     Date and time call was made  1/27/2020  9:50 AM    Hospital care reviewed  Records reviewed    Patient was hospitialized at  [facility]    Date of Admission  01/22/20    Date of discharge  01/23/20    Diagnosis  puncture L wrist with hematoma    Disposition  Home    Were the patients medications reviewed and updated  Yes    Current Symptoms  None      TCM Call (since 12/29/2019)     Post hospital issues  None    Should patient be enrolled in anticoag monitoring? No    Scheduled for follow up?   Yes    Did you obtain your prescribed medications  Yes    Do you need help managing your prescriptions or medications  No    Is transportation to your appointment needed  No    I have advised the patient to call PCP with any new or worsening symptoms    Leela Gilman LPN     Living Arrangements  Spouse or Significiant other    Are you recieving any outpatient services  No    Are you recieving home care services  No    Are you using any community resources  No    Current waiver services  No    Have you fallen in the last 12 months  No    Interperter language line needed  No      Patient is here for follow up to hospitalization for puncture wound to left wrist which resulted in large  Hematoma over left her radial side of her wrist   She still has a great deal of discoloration  Unrelated to this incident she does complain of fatigue and shortness of breath  The following portions of the patient's history were reviewed and updated as appropriate: allergies, current medications, past family history, past medical history, past social history, past surgical history and problem list     Review of Systems   Constitutional: Positive for fatigue  Negative for chills and fever  HENT: Negative for congestion and sore throat  Respiratory: Positive for shortness of breath  Negative for chest tightness  Cardiovascular: Negative for chest pain and palpitations  Gastrointestinal: Negative for abdominal pain, constipation, diarrhea and nausea  Genitourinary: Negative for difficulty urinating  Skin: Positive for color change and wound  Neurological: Negative for dizziness and headaches  Psychiatric/Behavioral: Negative  Objective:      /78 (BP Location: Left arm, Patient Position: Sitting)   Pulse 84   Temp (!) 97 1 °F (36 2 °C) (Tympanic)   Resp 17   Ht 5' 1 5" (1 562 m)   Wt 68 9 kg (152 lb)   SpO2 97%   BMI 28 26 kg/m²          Physical Exam   Constitutional: She is oriented to person, place, and time  She appears well-nourished  No distress  Neck: Carotid bruit is not present  No thyromegaly present  Cardiovascular: Normal rate, regular rhythm and normal heart sounds     Pulmonary/Chest: Effort normal and breath sounds normal    Musculoskeletal: She exhibits no edema  Lymphadenopathy:     She has no cervical adenopathy  Neurological: She is alert and oriented to person, place, and time  Skin: Skin is warm and dry  Ecchymotic area left wrist area  No hardness present  Psychiatric: She has a normal mood and affect  Nursing note and vitals reviewed

## 2020-02-26 DIAGNOSIS — I10 ESSENTIAL HYPERTENSION: Primary | ICD-10-CM

## 2020-02-26 RX ORDER — METOPROLOL SUCCINATE 100 MG/1
100 TABLET, EXTENDED RELEASE ORAL DAILY
Qty: 90 TABLET | Refills: 1 | Status: SHIPPED | OUTPATIENT
Start: 2020-02-26 | End: 2020-05-11

## 2020-03-12 ENCOUNTER — OFFICE VISIT (OUTPATIENT)
Dept: FAMILY MEDICINE CLINIC | Facility: CLINIC | Age: 85
End: 2020-03-12
Payer: MEDICARE

## 2020-03-12 VITALS
HEART RATE: 66 BPM | DIASTOLIC BLOOD PRESSURE: 76 MMHG | BODY MASS INDEX: 28.55 KG/M2 | SYSTOLIC BLOOD PRESSURE: 142 MMHG | HEIGHT: 61 IN | WEIGHT: 151.2 LBS | OXYGEN SATURATION: 92 % | TEMPERATURE: 97.5 F

## 2020-03-12 DIAGNOSIS — N39.46 MIXED STRESS AND URGE URINARY INCONTINENCE: Primary | ICD-10-CM

## 2020-03-12 DIAGNOSIS — Z00.00 MEDICARE ANNUAL WELLNESS VISIT, SUBSEQUENT: ICD-10-CM

## 2020-03-12 DIAGNOSIS — E78.2 MIXED HYPERLIPIDEMIA: ICD-10-CM

## 2020-03-12 DIAGNOSIS — I73.9 PERIPHERAL VASCULAR DISEASE (HCC): ICD-10-CM

## 2020-03-12 DIAGNOSIS — I10 ESSENTIAL HYPERTENSION: ICD-10-CM

## 2020-03-12 PROCEDURE — 1170F FXNL STATUS ASSESSED: CPT | Performed by: FAMILY MEDICINE

## 2020-03-12 PROCEDURE — G0438 PPPS, INITIAL VISIT: HCPCS | Performed by: FAMILY MEDICINE

## 2020-03-12 PROCEDURE — 1125F AMNT PAIN NOTED PAIN PRSNT: CPT | Performed by: FAMILY MEDICINE

## 2020-03-12 PROCEDURE — 4040F PNEUMOC VAC/ADMIN/RCVD: CPT | Performed by: FAMILY MEDICINE

## 2020-03-12 PROCEDURE — 1036F TOBACCO NON-USER: CPT | Performed by: FAMILY MEDICINE

## 2020-03-12 PROCEDURE — 99213 OFFICE O/P EST LOW 20 MIN: CPT | Performed by: FAMILY MEDICINE

## 2020-03-12 PROCEDURE — 1160F RVW MEDS BY RX/DR IN RCRD: CPT | Performed by: FAMILY MEDICINE

## 2020-03-12 PROCEDURE — 3078F DIAST BP <80 MM HG: CPT | Performed by: FAMILY MEDICINE

## 2020-03-12 PROCEDURE — 3077F SYST BP >= 140 MM HG: CPT | Performed by: FAMILY MEDICINE

## 2020-03-12 RX ORDER — OXYBUTYNIN CHLORIDE 5 MG/1
5 TABLET, EXTENDED RELEASE ORAL DAILY
Qty: 30 TABLET | Refills: 5 | Status: SHIPPED | OUTPATIENT
Start: 2020-03-12 | End: 2020-08-10 | Stop reason: SDUPTHER

## 2020-03-12 NOTE — PROGRESS NOTES
Assessment/Plan:    Patient is seen for follow-up to concerns regarding shortness of breath  She did not have her pulmonary function test her echocardiogram yet  She does complain of urinary incontinence and I have prescribed oxybutynin  She will let me know if it helps  We can increase the dose  It does cause dryness of mouth  Diagnoses and all orders for this visit:    Mixed stress and urge urinary incontinence  -     oxybutynin (DITROPAN-XL) 5 mg 24 hr tablet; Take 1 tablet (5 mg total) by mouth daily          Subjective:   Chief Complaint   Patient presents with    Medicare Wellness Visit    Follow-up     6 month check        Patient ID: Beverly Ashford is a 82 y o  female  Patient is concerned regarding fatigue  Also left lower leg more swollen than right  Urine incontinence  Post nasal drip      The following portions of the patient's history were reviewed and updated as appropriate: allergies, current medications, past family history, past medical history, past social history, past surgical history and problem list     Review of Systems   Constitutional: Negative for chills and fever  HENT: Positive for postnasal drip  Negative for congestion and sore throat  Respiratory: Negative for chest tightness  Cardiovascular: Positive for leg swelling  Negative for chest pain and palpitations  Gastrointestinal: Negative for abdominal pain, constipation, diarrhea and nausea  Genitourinary: Negative for difficulty urinating  Incontinence   Skin: Negative  Neurological: Negative for dizziness and headaches  Psychiatric/Behavioral: Negative  Objective:      /76 (BP Location: Left arm, Patient Position: Sitting, Cuff Size: Adult)   Pulse 66   Temp 97 5 °F (36 4 °C) (Tympanic)   Ht 5' 1" (1 549 m)   Wt 68 6 kg (151 lb 3 2 oz)   SpO2 92%   Breastfeeding No   BMI 28 57 kg/m²          Physical Exam   Constitutional: She is oriented to person, place, and time   She appears well-developed  No distress  Neck: Carotid bruit is not present  No thyromegaly present  Cardiovascular: Normal rate, regular rhythm and normal heart sounds  Pulmonary/Chest: Effort normal and breath sounds normal    Musculoskeletal: She exhibits edema  Lymphadenopathy:     She has no cervical adenopathy  Neurological: She is alert and oriented to person, place, and time  Skin: Skin is warm and dry  Stasis changes both legs  Left leg  Is a little more swollen than right  Psychiatric: She has a normal mood and affect  Nursing note and vitals reviewed

## 2020-03-12 NOTE — PROGRESS NOTES
Assessment and Plan:     Patient is an 82-year-old female seen for annual Medicare wellness exam   Problem List Items Addressed This Visit        Cardiovascular and Mediastinum    Essential hypertension    Peripheral vascular disease ([address] )       Other    Mixed hyperlipidemia      Other Visit Diagnoses     Mixed stress and urge urinary incontinence    -  Primary    Relevant Medications    oxybutynin (DITROPAN-XL) 5 mg 24 hr tablet    Medicare annual wellness visit, subsequent            BMI Counseling: Body mass index is 28 57 kg/m²  The BMI is above normal  Nutrition recommendations include encouraging healthy choices of fruits and vegetables, moderation in carbohydrate intake, increasing intake of lean protein and reducing intake of saturated and trans fat  Exercise recommendations include moderate physical activity 150 minutes/week  No pharmacotherapy was ordered  Preventive health issues were discussed with patient, and age appropriate screening tests were ordered as noted in patient's After Visit Summary  Personalized health advice and appropriate referrals for health education or preventive services given if needed, as noted in patient's After Visit Summary  History of Present Illness:     Patient presents for Annual Medicare visit       Patient Care Team:  Parul Unger DO as PCP - General (Family Medicine)     Review of Systems:        Problem List:     Patient Active Problem List   Diagnosis    Essential hypertension    Mixed hyperlipidemia    Urge incontinence    Spinal stenosis    Shoulder pain    Secondary osteoarthritis of shoulder, left    Pulmonary embolism (HCC)    Peripheral vascular disease ([address] )    Acquired hypothyroidism    Centrilobular emphysema ([address] )    Allergic rhinitis    Esophageal reflux    Enthesopathy of hip region    Diastolic dysfunction    Degeneration of intervertebral disc of lumbosacral region    Cough    Chondromalacia patellae      Past Medical and Surgical History:     History reviewed  No pertinent past medical history    Past Surgical History:   Procedure Laterality Date    HYSTERECTOMY      TOTAL SHOULDER ARTHROPLASTY Left       Family History:     Family History   Problem Relation Age of Onset    Heart attack Mother     Cerebral aneurysm Father     Diabetes Sister       Social History:     E-Cigarette/Vaping    E-Cigarette Use Never User      E-Cigarette/Vaping Substances    Nicotine No     THC No     CBD No     Flavoring No     Other No     Unknown No      Social History     Socioeconomic History    Marital status: /Civil Union     Spouse name: None    Number of children: None    Years of education: None    Highest education level: None   Occupational History    None   Social Needs    Financial resource strain: None    Food insecurity:     Worry: None     Inability: None    Transportation needs:     Medical: None     Non-medical: None   Tobacco Use    Smoking status: Former Smoker    Smokeless tobacco: Never Used   Substance and Sexual Activity    Alcohol use: Not Currently    Drug use: Never    Sexual activity: None   Lifestyle    Physical activity:     Days per week: None     Minutes per session: None    Stress: None   Relationships    Social connections:     Talks on phone: None     Gets together: None     Attends Christian service: None     Active member of club or organization: None     Attends meetings of clubs or organizations: None     Relationship status: None    Intimate partner violence:     Fear of current or ex partner: None     Emotionally abused: None     Physically abused: None     Forced sexual activity: None   Other Topics Concern    None   Social History Narrative    None      Medications and Allergies:     Current Outpatient Medications   Medication Sig Dispense Refill    aspirin (ECOTRIN LOW STRENGTH) 81 mg EC tablet Take 81 mg by mouth daily      Cholecalciferol (VITAMIN D3) 5000 units CAPS Take by mouth      citalopram (CeleXA) 20 mg tablet       levothyroxine 25 mcg tablet       losartan (COZAAR) 100 MG tablet   3    metoprolol succinate (TOPROL-XL) 100 mg 24 hr tablet Take 1 tablet (100 mg total) by mouth daily 90 tablet 1    simvastatin (ZOCOR) 20 mg tablet Take 1 tablet (20 mg total) by mouth daily at bedtime 90 tablet 1    SPIRIVA RESPIMAT 2 5 MCG/ACT AERS inhaler Inhale 2 puffs daily 2 Inhaler 3    zoledronic acid (RECLAST) 5 mg/100 mL IV infusion (premix) Infuse 5 mg into a venous catheter once      hydrocortisone 2 5 % cream       oxybutynin (DITROPAN-XL) 5 mg 24 hr tablet Take 1 tablet (5 mg total) by mouth daily 30 tablet 5    PROAIR  (90 Base) MCG/ACT inhaler        No current facility-administered medications for this visit  No Known Allergies   Immunizations:     Immunization History   Administered Date(s) Administered     Influenza (IM) Preservative Free 10/01/2019    INFLUENZA 10/29/2009, 09/12/2011, 10/25/2012, 10/10/2013, 11/08/2014, 09/27/2016, 10/12/2017, 10/30/2018    Pneumococcal Conjugate 13-Valent 11/17/2015    Pneumococcal Polysaccharide PPV23 11/24/2009    Tdap 08/09/2012, 02/18/2017      Health Maintenance: There are no preventive care reminders to display for this patient  There are no preventive care reminders to display for this patient  Medicare Screening Tests and Risk Assessments:     Kitty Montez is here for her Subsequent Wellness visit  Last Medicare Wellness visit information reviewed, patient interviewed and updates made to the record today  Health Risk Assessment:   Patient rates overall health as fair  Patient feels that their physical health rating is same  Eyesight was rated as same  Hearing was rated as slightly worse  Patient feels that their emotional and mental health rating is same  Pain experienced in the last 7 days has been some  Patient's pain rating has been 5/10   Patient states that she has experienced no weight loss or gain in last 6 months  Back pain    Depression Screening:   PHQ-2 Score: 2  PHQ-9 Score: 4      Fall Risk Screening: In the past year, patient has experienced: no history of falling in past year      Urinary Incontinence Screening:   Patient has leaked urine accidently in the last six months  Home Safety:  Patient has trouble with stairs inside or outside of their home  Patient has working smoke alarms and has working carbon monoxide detector  Home safety hazards include: none  Nutrition:   Current diet is Regular  Medications:   Patient is currently taking over-the-counter supplements  OTC medications include: see medication list  Patient is able to manage medications  Activities of Daily Living (ADLs)/Instrumental Activities of Daily Living (IADLs):   Walk and transfer into and out of bed and chair?: Yes  Dress and groom yourself?: Yes    Bathe or shower yourself?: Yes    Feed yourself? Yes  Do your laundry/housekeeping?: Yes  Manage your money, pay your bills and track your expenses?: Yes  Make your own meals?: Yes    Do your own shopping?: Yes    Previous Hospitalizations:   Any hospitalizations or ED visits within the last 12 months?: No      Advance Care Planning:   Living will: Yes    Durable POA for healthcare:  Yes    Advanced directive: Yes    End of Life Decisions reviewed with patient: Yes      Cognitive Screening:   Provider or family/friend/caregiver concerned regarding cognition?: No    PREVENTIVE SCREENINGS      Cardiovascular Screening:    General: Screening Not Indicated and History Lipid Disorder      Diabetes Screening:     General: Screening Current      Colorectal Cancer Screening:     General: Screening Not Indicated      Breast Cancer Screening:     General: Patient Declines      Cervical Cancer Screening:    General: Screening Not Indicated      Osteoporosis Screening:    General: Screening Current      Lung Cancer Screening:     General: Screening Not Indicated Hepatitis C Screening:    General: Screening Not Indicated         Physical Exam:     /76 (BP Location: Left arm, Patient Position: Sitting, Cuff Size: Adult)   Pulse 66   Temp 97 5 °F (36 4 °C) (Tympanic)   Ht 5' 1" (1 549 m)   Wt 68 6 kg (151 lb 3 2 oz)   SpO2 92%   Breastfeeding No   BMI 28 57 kg/m²         Katya Butler DO

## 2020-03-12 NOTE — PATIENT INSTRUCTIONS
Use steroid nasal spray - Flonase or Nasacort - one spray each nostril once a day              Medicare Preventive Visit Patient Instructions  Thank you for completing your Welcome to Medicare Visit or Medicare Annual Wellness Visit today  Your next wellness visit will be due in one year (3/12/2021)  The screening/preventive services that you may require over the next 5-10 years are detailed below  Some tests may not apply to you based off risk factors and/or age  Screening tests ordered at today's visit but not completed yet may show as past due  Also, please note that scanned in results may not display below  Preventive Screenings:  Service Recommendations Previous Testing/Comments   Colorectal Cancer Screening  * Colonoscopy    * Fecal Occult Blood Test (FOBT)/Fecal Immunochemical Test (FIT)  * Fecal DNA/Cologuard Test  * Flexible Sigmoidoscopy Age: 54-65 years old   Colonoscopy: every 10 years (may be performed more frequently if at higher risk)  OR  FOBT/FIT: every 1 year  OR  Cologuard: every 3 years  OR  Sigmoidoscopy: every 5 years  Screening may be recommended earlier than age 48 if at higher risk for colorectal cancer  Also, an individualized decision between you and your healthcare provider will decide whether screening between the ages of 74-80 would be appropriate  Colonoscopy: Not on file  FOBT/FIT: Not on file  Cologuard: Not on file  Sigmoidoscopy: Not on file         Breast Cancer Screening Age: 36 years old  Frequency: every 1-2 years  Not required if history of left and right mastectomy Mammogram: Not on file       Cervical Cancer Screening Between the ages of 21-29, pap smear recommended once every 3 years  Between the ages of 33-67, can perform pap smear with HPV co-testing every 5 years     Recommendations may differ for women with a history of total hysterectomy, cervical cancer, or abnormal pap smears in past  Pap Smear: Not on file    Screening Not Indicated   Hepatitis C Screening Once for adults born between 1945 and 1965  More frequently in patients at high risk for Hepatitis C Hep C Antibody: Not on file       Diabetes Screening 1-2 times per year if you're at risk for diabetes or have pre-diabetes Fasting glucose: 96 mg/dL   A1C: 5 7    Screening Current   Cholesterol Screening Once every 5 years if you don't have a lipid disorder  May order more often based on risk factors  Lipid panel: 01/16/2020    Screening Not Indicated  History Lipid Disorder     Other Preventive Screenings Covered by Medicare:  1  Abdominal Aortic Aneurysm (AAA) Screening: covered once if your at risk  You're considered to be at risk if you have a family history of AAA  2  Lung Cancer Screening: covers low dose CT scan once per year if you meet all of the following conditions: (1) Age 50-69; (2) No signs or symptoms of lung cancer; (3) Current smoker or have quit smoking within the last 15 years; (4) You have a tobacco smoking history of at least 30 pack years (packs per day multiplied by number of years you smoked); (5) You get a written order from a healthcare provider  3  Glaucoma Screening: covered annually if you're considered high risk: (1) You have diabetes OR (2) Family history of glaucoma OR (3)  aged 48 and older OR (3)  American aged 72 and older  3  Osteoporosis Screening: covered every 2 years if you meet one of the following conditions: (1) You're estrogen deficient and at risk for osteoporosis based off medical history and other findings; (2) Have a vertebral abnormality; (3) On glucocorticoid therapy for more than 3 months; (4) Have primary hyperparathyroidism; (5) On osteoporosis medications and need to assess response to drug therapy  · Last bone density test (DXA Scan): Not on file  5  HIV Screening: covered annually if you're between the age of 12-76  Also covered annually if you are younger than 13 and older than 72 with risk factors for HIV infection   For pregnant patients, it is covered up to 3 times per pregnancy  Immunizations:  Immunization Recommendations   Influenza Vaccine Annual influenza vaccination during flu season is recommended for all persons aged >= 6 months who do not have contraindications   Pneumococcal Vaccine (Prevnar and Pneumovax)  * Prevnar = PCV13  * Pneumovax = PPSV23   Adults 25-60 years old: 1-3 doses may be recommended based on certain risk factors  Adults 72 years old: Prevnar (PCV13) vaccine recommended followed by Pneumovax (PPSV23) vaccine  If already received PPSV23 since turning 65, then PCV13 recommended at least one year after PPSV23 dose  Hepatitis B Vaccine 3 dose series if at intermediate or high risk (ex: diabetes, end stage renal disease, liver disease)   Tetanus (Td) Vaccine - COST NOT COVERED BY MEDICARE PART B Following completion of primary series, a booster dose should be given every 10 years to maintain immunity against tetanus  Td may also be given as tetanus wound prophylaxis  Tdap Vaccine - COST NOT COVERED BY MEDICARE PART B Recommended at least once for all adults  For pregnant patients, recommended with each pregnancy  Shingles Vaccine (Shingrix) - COST NOT COVERED BY MEDICARE PART B  2 shot series recommended in those aged 48 and above     Health Maintenance Due:  There are no preventive care reminders to display for this patient  Immunizations Due:  There are no preventive care reminders to display for this patient  Advance Directives   What are advance directives? Advance directives are legal documents that state your wishes and plans for medical care  These plans are made ahead of time in case you lose your ability to make decisions for yourself  Advance directives can apply to any medical decision, such as the treatments you want, and if you want to donate organs  What are the types of advance directives? There are many types of advance directives, and each state has rules about how to use them   You may choose a combination of any of the following:  · Living will: This is a written record of the treatment you want  You can also choose which treatments you do not want, which to limit, and which to stop at a certain time  This includes surgery, medicine, IV fluid, and tube feedings  · Durable power of  for healthcare [facility]): This is a written record that states who you want to make healthcare choices for you when you are unable to make them for yourself  This person, called a proxy, is usually a family member or a friend  You may choose more than 1 proxy  · Do not resuscitate (DNR) order:  A DNR order is used in case your heart stops beating or you stop breathing  It is a request not to have certain forms of treatment, such as CPR  A DNR order may be included in other types of advance directives  · Medical directive: This covers the care that you want if you are in a coma, near death, or unable to make decisions for yourself  You can list the treatments you want for each condition  Treatment may include pain medicine, surgery, blood transfusions, dialysis, IV or tube feedings, and a ventilator (breathing machine)  · Values history: This document has questions about your views, beliefs, and how you feel and think about life  This information can help others choose the care that you would choose  Why are advance directives important? An advance directive helps you control your care  Although spoken wishes may be used, it is better to have your wishes written down  Spoken wishes can be misunderstood, or not followed  Treatments may be given even if you do not want them  An advance directive may make it easier for your family to make difficult choices about your care  Depression   Depression  is a medical condition that causes feelings of sadness or hopelessness that do not go away  Depression may cause you to lose interest in things you used to enjoy   These feelings may interfere with your daily life   Call your local emergency number (911 in the [address] [place] [address] [address]) if:   · You think about harming yourself or someone else  · You have done something on purpose to hurt yourself  The following resources are available at any time to help you, if needed:   · [address]: 5-452.654.7209 (1-422-954-ADTT)   · Suicide Hotline: 6-393.776.1186 (0-559-FHDCSKO)   · For a list of international numbers: https://save org/find-help/international-resources/  Treatment for depression may include medicine to relieve depression  Medicine is often used together with therapy  Therapy is a way for you to talk about your feelings and anything that may be causing depression  Therapy can be done alone or in a group  It may also be done with family members or a significant other  · Get regular physical activity  · Create a regular sleep schedule  · Eat a variety of healthy foods  · Do not drink alcohol or use drugs  Urinary Incontinence   Urinary incontinence (UI)  is when you lose control of your bladder  UI develops because your bladder cannot store or empty urine properly  The 3 most common types of UI are stress incontinence, urge incontinence, or both  Medicines:   · May be given to help strengthen your bladder control  Report any side effects of medication to your healthcare provider  Do pelvic muscle exercises often:  Your pelvic muscles help you stop urinating  Squeeze these muscles tight for 5 seconds, then relax for 5 seconds  Gradually work up to squeezing for 10 seconds  Do 3 sets of 15 repetitions a day, or as directed  This will help strengthen your pelvic muscles and improve bladder control  Train your bladder:  Go to the bathroom at set times, such as every 2 hours, even if you do not feel the urge to go  You can also try to hold your urine when you feel the urge to go  For example, hold your urine for 5 minutes when you feel the urge to go   As that becomes easier, hold your urine for 10 minutes  Self-care:   · Keep a UI record  Write down how often you leak urine and how much you leak  Make a note of what you were doing when you leaked urine  · Drink liquids as directed  You may need to limit the amount of liquid you drink to help control your urine leakage  Do not drink any liquid right before you go to bed  Limit or do not have drinks that contain caffeine or alcohol  · Prevent constipation  Eat a variety of high-fiber foods  Good examples are high-fiber cereals, beans, vegetables, and whole-grain breads  Walking is the best way to trigger your intestines to have a bowel movement  · Exercise regularly and maintain a healthy weight  Weight loss and exercise will decrease pressure on your bladder and help you control your leakage  · Use a catheter as directed  to help empty your bladder  A catheter is a tiny, plastic tube that is put into your bladder to drain your urine  · Go to behavior therapy as directed  Behavior therapy may be used to help you learn to control your urge to urinate  Weight Management   Why it is important to manage your weight:  Being overweight increases your risk of health conditions such as heart disease, high blood pressure, type 2 diabetes, and certain types of cancer  It can also increase your risk for osteoarthritis, sleep apnea, and other respiratory problems  Aim for a slow, steady weight loss  Even a small amount of weight loss can lower your risk of health problems  How to lose weight safely:  A safe and healthy way to lose weight is to eat fewer calories and get regular exercise  You can lose up about 1 pound a week by decreasing the number of calories you eat by 500 calories each day  Healthy meal plan for weight management:  A healthy meal plan includes a variety of foods, contains fewer calories, and helps you stay healthy  A healthy meal plan includes the following:  · Eat whole-grain foods more often    A healthy meal plan should contain fiber  Fiber is the part of grains, fruits, and vegetables that is not broken down by your body  Whole-grain foods are healthy and provide extra fiber in your diet  Some examples of whole-grain foods are whole-wheat breads and pastas, oatmeal, brown rice, and bulgur  · Eat a variety of vegetables every day  Include dark, leafy greens such as spinach, kale, nellie greens, and mustard greens  Eat yellow and orange vegetables such as carrots, sweet potatoes, and winter squash  · Eat a variety of fruits every day  Choose fresh or canned fruit (canned in its own juice or light syrup) instead of juice  Fruit juice has very little or no fiber  · Eat low-fat dairy foods  Drink fat-free (skim) milk or 1% milk  Eat fat-free yogurt and low-fat cottage cheese  Try low-fat cheeses such as mozzarella and other reduced-fat cheeses  · Choose meat and other protein foods that are low in fat  Choose beans or other legumes such as split peas or lentils  Choose fish, skinless poultry (chicken or turkey), or lean cuts of red meat (beef or pork)  Before you cook meat or poultry, cut off any visible fat  · Use less fat and oil  Try baking foods instead of frying them  Add less fat, such as margarine, sour cream, regular salad dressing and mayonnaise to foods  Eat fewer high-fat foods  Some examples of high-fat foods include french fries, doughnuts, ice cream, and cakes  · Eat fewer sweets  Limit foods and drinks that are high in sugar  This includes candy, cookies, regular soda, and sweetened drinks  Exercise:  Exercise at least 30 minutes per day on most days of the week  Some examples of exercise include walking, biking, dancing, and swimming  You can also fit in more physical activity by taking the stairs instead of the elevator or parking farther away from stores  Ask your healthcare provider about the best exercise plan for you        © Copyright [employer] 2018 Information is for End User's use only and may not be sold, redistributed or otherwise used for commercial purposes   All illustrations and images included in CareNotes® are the copyrighted property of A D A M , Inc  or [facility] Alexa Thomas

## 2020-05-10 DIAGNOSIS — I10 ESSENTIAL HYPERTENSION: ICD-10-CM

## 2020-05-11 RX ORDER — METOPROLOL SUCCINATE 100 MG/1
TABLET, EXTENDED RELEASE ORAL
Qty: 90 TABLET | Refills: 0 | Status: SHIPPED | OUTPATIENT
Start: 2020-05-11 | End: 2020-11-09 | Stop reason: SDUPTHER

## 2020-05-20 DIAGNOSIS — E03.9 ACQUIRED HYPOTHYROIDISM: Primary | ICD-10-CM

## 2020-05-20 RX ORDER — LEVOTHYROXINE SODIUM 0.03 MG/1
25 TABLET ORAL DAILY
Qty: 30 TABLET | Refills: 2 | Status: SHIPPED | OUTPATIENT
Start: 2020-05-20 | End: 2020-08-12

## 2020-07-29 ENCOUNTER — OFFICE VISIT (OUTPATIENT)
Dept: FAMILY MEDICINE CLINIC | Facility: CLINIC | Age: 85
End: 2020-07-29
Payer: MEDICARE

## 2020-07-29 ENCOUNTER — APPOINTMENT (OUTPATIENT)
Dept: LAB | Facility: CLINIC | Age: 85
End: 2020-07-29
Payer: MEDICARE

## 2020-07-29 VITALS
OXYGEN SATURATION: 93 % | HEART RATE: 81 BPM | SYSTOLIC BLOOD PRESSURE: 132 MMHG | TEMPERATURE: 97.3 F | RESPIRATION RATE: 18 BRPM | HEIGHT: 62 IN | DIASTOLIC BLOOD PRESSURE: 70 MMHG | WEIGHT: 148.6 LBS | BODY MASS INDEX: 27.34 KG/M2

## 2020-07-29 DIAGNOSIS — L81.9 DISCOLORATION OF SKIN: ICD-10-CM

## 2020-07-29 DIAGNOSIS — E78.2 MIXED HYPERLIPIDEMIA: ICD-10-CM

## 2020-07-29 DIAGNOSIS — I10 ESSENTIAL HYPERTENSION: Primary | ICD-10-CM

## 2020-07-29 DIAGNOSIS — E03.9 ACQUIRED HYPOTHYROIDISM: ICD-10-CM

## 2020-07-29 DIAGNOSIS — I10 ESSENTIAL HYPERTENSION: ICD-10-CM

## 2020-07-29 LAB
ALBUMIN SERPL BCP-MCNC: 3.3 G/DL (ref 3.5–5)
ALP SERPL-CCNC: 51 U/L (ref 46–116)
ALT SERPL W P-5'-P-CCNC: 18 U/L (ref 12–78)
ANION GAP SERPL CALCULATED.3IONS-SCNC: 3 MMOL/L (ref 4–13)
AST SERPL W P-5'-P-CCNC: 10 U/L (ref 5–45)
BASOPHILS # BLD AUTO: 0.03 THOUSANDS/ΜL (ref 0–0.1)
BASOPHILS NFR BLD AUTO: 0 % (ref 0–1)
BILIRUB SERPL-MCNC: 0.48 MG/DL (ref 0.2–1)
BUN SERPL-MCNC: 16 MG/DL (ref 5–25)
CALCIUM SERPL-MCNC: 9.9 MG/DL (ref 8.3–10.1)
CHLORIDE SERPL-SCNC: 107 MMOL/L (ref 100–108)
CO2 SERPL-SCNC: 31 MMOL/L (ref 21–32)
CREAT SERPL-MCNC: 0.87 MG/DL (ref 0.6–1.3)
EOSINOPHIL # BLD AUTO: 0.16 THOUSAND/ΜL (ref 0–0.61)
EOSINOPHIL NFR BLD AUTO: 2 % (ref 0–6)
ERYTHROCYTE [DISTWIDTH] IN BLOOD BY AUTOMATED COUNT: 13.8 % (ref 11.6–15.1)
GFR SERPL CREATININE-BSD FRML MDRD: 62 ML/MIN/1.73SQ M
GLUCOSE SERPL-MCNC: 115 MG/DL (ref 65–140)
HCT VFR BLD AUTO: 42.8 % (ref 34.8–46.1)
HGB BLD-MCNC: 14 G/DL (ref 11.5–15.4)
IMM GRANULOCYTES # BLD AUTO: 0.02 THOUSAND/UL (ref 0–0.2)
IMM GRANULOCYTES NFR BLD AUTO: 0 % (ref 0–2)
LYMPHOCYTES # BLD AUTO: 1.93 THOUSANDS/ΜL (ref 0.6–4.47)
LYMPHOCYTES NFR BLD AUTO: 28 % (ref 14–44)
MCH RBC QN AUTO: 30.2 PG (ref 26.8–34.3)
MCHC RBC AUTO-ENTMCNC: 32.7 G/DL (ref 31.4–37.4)
MCV RBC AUTO: 92 FL (ref 82–98)
MONOCYTES # BLD AUTO: 0.59 THOUSAND/ΜL (ref 0.17–1.22)
MONOCYTES NFR BLD AUTO: 9 % (ref 4–12)
NEUTROPHILS # BLD AUTO: 4.18 THOUSANDS/ΜL (ref 1.85–7.62)
NEUTS SEG NFR BLD AUTO: 61 % (ref 43–75)
NRBC BLD AUTO-RTO: 0 /100 WBCS
PLATELET # BLD AUTO: 207 THOUSANDS/UL (ref 149–390)
PMV BLD AUTO: 11.7 FL (ref 8.9–12.7)
POTASSIUM SERPL-SCNC: 4.3 MMOL/L (ref 3.5–5.3)
PROT SERPL-MCNC: 6.6 G/DL (ref 6.4–8.2)
RBC # BLD AUTO: 4.63 MILLION/UL (ref 3.81–5.12)
SODIUM SERPL-SCNC: 141 MMOL/L (ref 136–145)
TSH SERPL DL<=0.05 MIU/L-ACNC: 1.11 UIU/ML (ref 0.36–3.74)
WBC # BLD AUTO: 6.91 THOUSAND/UL (ref 4.31–10.16)

## 2020-07-29 PROCEDURE — 99213 OFFICE O/P EST LOW 20 MIN: CPT | Performed by: FAMILY MEDICINE

## 2020-07-29 PROCEDURE — 3078F DIAST BP <80 MM HG: CPT | Performed by: FAMILY MEDICINE

## 2020-07-29 PROCEDURE — 1160F RVW MEDS BY RX/DR IN RCRD: CPT | Performed by: FAMILY MEDICINE

## 2020-07-29 PROCEDURE — 4040F PNEUMOC VAC/ADMIN/RCVD: CPT | Performed by: FAMILY MEDICINE

## 2020-07-29 PROCEDURE — 84443 ASSAY THYROID STIM HORMONE: CPT

## 2020-07-29 PROCEDURE — 80053 COMPREHEN METABOLIC PANEL: CPT

## 2020-07-29 PROCEDURE — 3075F SYST BP GE 130 - 139MM HG: CPT | Performed by: FAMILY MEDICINE

## 2020-07-29 PROCEDURE — 36415 COLL VENOUS BLD VENIPUNCTURE: CPT

## 2020-07-29 PROCEDURE — 1036F TOBACCO NON-USER: CPT | Performed by: FAMILY MEDICINE

## 2020-07-29 PROCEDURE — 85025 COMPLETE CBC W/AUTO DIFF WBC: CPT
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
## 2020-07-29 NOTE — PATIENT INSTRUCTIONS
Lower Back Exercises   WHAT YOU NEED TO KNOW:   Lower back exercises help heal and strengthen your back muscles to prevent another injury  Ask your healthcare provider if you need to see a physical therapist for more advanced exercises  DISCHARGE INSTRUCTIONS:   Return to the emergency department if:   · You have severe pain that prevents you from moving  Contact your healthcare provider if:   · Your pain becomes worse  · You have new pain  · You have questions or concerns about your condition or care  Do lower back exercises safely:   · Do the exercises on a mat or firm surface  (not on a bed) to support your spine and prevent low back pain  · Move slowly and smoothly  Avoid fast or jerky motions  · Breathe normally  Do not hold your breath  · Stop if you feel pain  It is normal to feel some discomfort at first  Regular exercise will help decrease your discomfort over time  Lower back exercises: Your healthcare provider may recommend that you do back exercises 10 to 30 minutes each day  He may also recommend that you do exercises 1 to 3 times each day  Ask your healthcare provider which exercises are best for you and how often to do them  · Ankle pumps:  Lie on your back  Move your foot up (with your toes pointing toward your head)  Then, move your foot down (with your toes pointing away from you)  Repeat this exercise 10 times on each side  · Heel slides:  Lie on your back  Slowly bend one leg and then straighten it  Next, bend the other leg and then straighten it  Repeat 10 times on each side  · Pelvic tilt:  Lie on your back with your knees bent and feet flat on the floor  Place your arms in a relaxed position beside your body  Tighten the muscles of your abdomen and flatten your back against the floor  Hold for 5 seconds  Repeat 5 times  · Back stretch:  Lie on your back with your hands behind your head   Bend your knees and turn the lower half of your body to one side  Hold this position for 10 seconds  Repeat 3 times on each side  · Straight leg raises:  Lie on your back with one leg straight  Bend the other knee  Tighten your abdomen and then slowly lift the straight leg up about 6 to 12 inches off the floor  Hold for 1 to 5 seconds  Lower your leg slowly  Repeat 10 times on each leg  · Knee-to-chest:  Lie on your back with your knees bent and feet flat on the floor  Pull one of your knees toward your chest and hold it there for 5 seconds  Return your leg to the starting position  Lift the other knee toward your chest and hold for 5 seconds  Do this 5 times on each side  · Cat and camel:  Place your hands and knees on the floor  Arch your back upward toward the ceiling and lower your head  Round out your spine as much as you can  Hold for 5 seconds  Lift your head upward and push your chest downward toward the floor  Hold for 5 seconds  Do 3 sets or as directed  · Wall squats:  Stand with your back against a wall  Tighten the muscles of your abdomen  Slowly lower your body until your knees are bent at a 45 degree angle  Hold this position for 5 seconds  Slowly move back up to a standing position  Repeat 10 times  · Curl up:  Lie on your back with your knees bent and feet flat on the floor  Place your hands, palms down, underneath the curve in your lower back  Next, with your elbows on the floor, lift your shoulders and chest 2 to 3 inches  Keep your head in line with your shoulders  Hold this position for 5 seconds  When you can do this exercise without pain for 10 to 15 seconds, you may add a rotation  While your shoulders and chest are lifted off the ground, turn slightly to the left and hold  Repeat on the other side  · Bird dog:  Place your hands and knees on the floor  Keep your wrists directly below your shoulders and your knees directly below your hips   Pull your belly button in toward your spine  Do not flatten or arch your back  Tighten your abdominal muscles  Raise one arm straight out so that it is aligned with your head  Next, raise the leg opposite your arm  Hold this position for 15 seconds  Lower your arm and leg slowly and change sides  Do 5 sets  © 2017 [address] Information is for End User's use only and may not be sold, redistributed or otherwise used for commercial purposes  All illustrations and images included in CareNotes® are the copyrighted property of [employer] , [employer]  or Ean [employer]  The above information is an  only  It is not intended as medical advice for individual conditions or treatments  Talk to your doctor, nurse or pharmacist before following any medical regimen to see if it is safe and effective for you

## 2020-08-06 DIAGNOSIS — E03.9 ACQUIRED HYPOTHYROIDISM: ICD-10-CM

## 2020-08-07 NOTE — TELEPHONE ENCOUNTER
Is levothyroxine the same as Euthrox  It looks like we have been writing for Levothyroxine? Cn we check with pharamcy

## 2020-08-10 DIAGNOSIS — N39.46 MIXED STRESS AND URGE URINARY INCONTINENCE: ICD-10-CM

## 2020-08-10 RX ORDER — OXYBUTYNIN CHLORIDE 5 MG/1
5 TABLET, EXTENDED RELEASE ORAL DAILY
Qty: 30 TABLET | Refills: 5 | Status: SHIPPED | OUTPATIENT
Start: 2020-08-10 | End: 2020-11-30 | Stop reason: SINTOL

## 2020-08-11 NOTE — TELEPHONE ENCOUNTER
Spoke with pharmacy, states Euthrox is the generic for levothyroxine  Pt has been given Euthrox the last 3 times she picked up rx

## 2020-08-12 DIAGNOSIS — E03.9 ACQUIRED HYPOTHYROIDISM: ICD-10-CM

## 2020-08-12 RX ORDER — LEVOTHYROXINE SODIUM 25 UG/1
TABLET ORAL
Qty: 30 TABLET | Refills: 5 | Status: SHIPPED | OUTPATIENT
Start: 2020-08-12 | End: 2020-08-12 | Stop reason: SDUPTHER

## 2020-08-12 RX ORDER — LEVOTHYROXINE SODIUM 0.03 MG/1
25 TABLET ORAL DAILY
Qty: 30 TABLET | Refills: 5 | Status: SHIPPED | OUTPATIENT
Start: 2020-08-12 | End: 2021-02-05 | Stop reason: SDUPTHER

## 2020-09-04 DIAGNOSIS — J43.2 CENTRILOBULAR EMPHYSEMA (HCC): ICD-10-CM

## 2020-09-04 RX ORDER — TIOTROPIUM BROMIDE INHALATION SPRAY 3.12 UG/1
2 SPRAY, METERED RESPIRATORY (INHALATION) DAILY
Qty: 2 INHALER | Refills: 3 | Status: SHIPPED | OUTPATIENT
Start: 2020-09-04 | End: 2021-05-17

## 2020-09-25 ENCOUNTER — OFFICE VISIT (OUTPATIENT)
Dept: FAMILY MEDICINE CLINIC | Facility: CLINIC | Age: 85
End: 2020-09-25
Payer: MEDICARE

## 2020-09-25 VITALS
HEART RATE: 67 BPM | TEMPERATURE: 98.9 F | SYSTOLIC BLOOD PRESSURE: 154 MMHG | HEIGHT: 62 IN | DIASTOLIC BLOOD PRESSURE: 70 MMHG | RESPIRATION RATE: 16 BRPM | BODY MASS INDEX: 27.27 KG/M2 | WEIGHT: 148.2 LBS | OXYGEN SATURATION: 97 %

## 2020-09-25 DIAGNOSIS — H00.015 HORDEOLUM EXTERNUM OF LEFT LOWER EYELID: Primary | ICD-10-CM

## 2020-09-25 PROCEDURE — 99213 OFFICE O/P EST LOW 20 MIN: CPT | Performed by: NURSE PRACTITIONER

## 2020-09-25 RX ORDER — OFLOXACIN 3 MG/ML
1 SOLUTION/ DROPS OPHTHALMIC 4 TIMES DAILY
Qty: 5 ML | Refills: 0 | Status: SHIPPED | OUTPATIENT
Start: 2020-09-25 | End: 2020-11-09

## 2020-09-25 NOTE — PROGRESS NOTES
[facility]    ASSESSMENT AND PLAN     1  Hordeolum externum of left lower eyelid  Presentation consistent with stye, left eye lower lid  Rx today for Ocuflox drops  Warm compresses several times per day  Re-evaluate if symptoms change, persist and/or worsen  Immediate re-eval with any pain and or visual concerns  - ofloxacin (OCUFLOX) 0 3 % ophthalmic solution; Administer 1 drop into the left eye 4 (four) times a day  Dispense: 5 mL; Refill: 0        SUBJECTIVE       Patient ID: Dulce Damon is a 82 y o  female  Chief Complaint   Patient presents with    Eye Problem     Eyelid Redness and Itch/Burning Corner of Eye x2wks       HISTORY OF PRESENT ILLNESS    Presents today with left eye complaint  Notes her left eye lid to be swollen and red for the last 3 weeks  Notes some itching in the outer corner and occasional burning feeling of her skin  Denies any vision change  Denies tearing  Denies discharge  Denies eye pain  The following portions of the patient's history were reviewed and updated as appropriate: allergies, current medications, past family history, past medical history, past social history, past surgical history and problem list     REVIEW OF SYSTEMS  Review of Systems   Eyes: Positive for itching  Negative for photophobia, pain, discharge, redness and visual disturbance          Eye lid swelling with redness       OBJECTIVE      VITAL SIGNS  /70   Pulse 67   Temp 98 9 °F (37 2 °C) (Tympanic)   Resp 16   Ht 5' 1 5" (1 562 m)   Wt 67 2 kg (148 lb 3 2 oz)   SpO2 97%   BMI 27 55 kg/m²     CURRENT MEDICATIONS    Current Outpatient Medications:     aspirin (ECOTRIN LOW STRENGTH) 81 mg EC tablet, Take 81 mg by mouth daily, Disp: , Rfl:     Cholecalciferol (VITAMIN D3) 5000 units CAPS, Take by mouth, Disp: , Rfl:     citalopram (CeleXA) 20 mg tablet, , Disp: , Rfl:     hydrocortisone 2 5 % cream, , Disp: , Rfl:     levothyroxine (Euthyrox) 25 mcg tablet, Take 1 tablet (25 mcg total) by mouth daily, Disp: 30 tablet, Rfl: 5    losartan (COZAAR) 100 MG tablet, , Disp: , Rfl: 3    metoprolol succinate (TOPROL-XL) 100 mg 24 hr tablet, Take 1 tablet by mouth once daily, Disp: 90 tablet, Rfl: 0    oxybutynin (DITROPAN-XL) 5 mg 24 hr tablet, Take 1 tablet (5 mg total) by mouth daily, Disp: 30 tablet, Rfl: 5    PROAIR  (90 Base) MCG/ACT inhaler, , Disp: , Rfl:     simvastatin (ZOCOR) 20 mg tablet, Take 1 tablet (20 mg total) by mouth daily at bedtime, Disp: 90 tablet, Rfl: 1    Spiriva Respimat 2 5 MCG/ACT AERS inhaler, Inhale 2 puffs daily, Disp: 2 Inhaler, Rfl: 3    ofloxacin (OCUFLOX) 0 3 % ophthalmic solution, Administer 1 drop into the left eye 4 (four) times a day, Disp: 5 mL, Rfl: 0    zoledronic acid (RECLAST) 5 mg/100 mL IV infusion (premix), Infuse 5 mg into a venous catheter once, Disp: , Rfl:       PHYSICAL EXAMINATION   Physical Exam  Vitals signs and nursing note reviewed  Constitutional:       General: She is not in acute distress  Appearance: Normal appearance  She is not ill-appearing  Eyes:      General: Lids are normal       Conjunctiva/sclera:      Right eye: Right conjunctiva is not injected  Left eye: Left conjunctiva is not injected  Neurological:      Mental Status: She is alert

## 2020-11-04 ENCOUNTER — OFFICE VISIT (OUTPATIENT)
Dept: FAMILY MEDICINE CLINIC | Facility: CLINIC | Age: 85
End: 2020-11-04
Payer: MEDICARE

## 2020-11-04 VITALS
OXYGEN SATURATION: 94 % | RESPIRATION RATE: 16 BRPM | WEIGHT: 149.4 LBS | SYSTOLIC BLOOD PRESSURE: 150 MMHG | DIASTOLIC BLOOD PRESSURE: 80 MMHG | HEIGHT: 61 IN | HEART RATE: 69 BPM | BODY MASS INDEX: 28.21 KG/M2 | TEMPERATURE: 98.1 F

## 2020-11-04 DIAGNOSIS — B02.9 HERPES ZOSTER WITHOUT COMPLICATION: Primary | ICD-10-CM

## 2020-11-04 PROCEDURE — 99213 OFFICE O/P EST LOW 20 MIN: CPT | Performed by: FAMILY MEDICINE

## 2020-11-04 RX ORDER — VALACYCLOVIR HYDROCHLORIDE 1 G/1
1000 TABLET, FILM COATED ORAL 3 TIMES DAILY
Qty: 21 TABLET | Refills: 0 | Status: SHIPPED | OUTPATIENT
Start: 2020-11-04 | End: 2022-01-25 | Stop reason: ALTCHOICE

## 2020-11-09 ENCOUNTER — OFFICE VISIT (OUTPATIENT)
Dept: FAMILY MEDICINE CLINIC | Facility: CLINIC | Age: 85
End: 2020-11-09
Payer: MEDICARE

## 2020-11-09 VITALS
BODY MASS INDEX: 27.94 KG/M2 | SYSTOLIC BLOOD PRESSURE: 144 MMHG | WEIGHT: 148 LBS | HEART RATE: 67 BPM | OXYGEN SATURATION: 97 % | HEIGHT: 61 IN | DIASTOLIC BLOOD PRESSURE: 70 MMHG | TEMPERATURE: 97.3 F | RESPIRATION RATE: 17 BRPM

## 2020-11-09 DIAGNOSIS — I10 ESSENTIAL HYPERTENSION: ICD-10-CM

## 2020-11-09 DIAGNOSIS — R53.83 FATIGUE, UNSPECIFIED TYPE: Primary | ICD-10-CM

## 2020-11-09 PROCEDURE — 99213 OFFICE O/P EST LOW 20 MIN: CPT | Performed by: FAMILY MEDICINE

## 2020-11-09 RX ORDER — METOPROLOL SUCCINATE 100 MG/1
100 TABLET, EXTENDED RELEASE ORAL DAILY
Qty: 90 TABLET | Refills: 0 | Status: SHIPPED | OUTPATIENT
Start: 2020-11-09 | End: 2021-02-18 | Stop reason: SDUPTHER

## 2020-11-30 ENCOUNTER — APPOINTMENT (OUTPATIENT)
Dept: RADIOLOGY | Facility: CLINIC | Age: 85
End: 2020-11-30
Payer: MEDICARE

## 2020-11-30 ENCOUNTER — OFFICE VISIT (OUTPATIENT)
Dept: FAMILY MEDICINE CLINIC | Facility: CLINIC | Age: 85
End: 2020-11-30
Payer: MEDICARE

## 2020-11-30 VITALS
OXYGEN SATURATION: 95 % | WEIGHT: 149 LBS | TEMPERATURE: 97.1 F | HEIGHT: 62 IN | SYSTOLIC BLOOD PRESSURE: 140 MMHG | DIASTOLIC BLOOD PRESSURE: 78 MMHG | BODY MASS INDEX: 27.42 KG/M2 | RESPIRATION RATE: 17 BRPM | HEART RATE: 78 BPM

## 2020-11-30 DIAGNOSIS — R07.89 CHEST DISCOMFORT: ICD-10-CM

## 2020-11-30 DIAGNOSIS — Z78.0 POST-MENOPAUSAL: ICD-10-CM

## 2020-11-30 DIAGNOSIS — E78.2 MIXED HYPERLIPIDEMIA: ICD-10-CM

## 2020-11-30 DIAGNOSIS — R06.02 SHORTNESS OF BREATH: ICD-10-CM

## 2020-11-30 DIAGNOSIS — R53.83 FATIGUE, UNSPECIFIED TYPE: ICD-10-CM

## 2020-11-30 DIAGNOSIS — L98.9 SKIN LESION: ICD-10-CM

## 2020-11-30 DIAGNOSIS — I10 ESSENTIAL HYPERTENSION: Primary | ICD-10-CM

## 2020-11-30 DIAGNOSIS — Z13.820 SCREENING FOR OSTEOPOROSIS: ICD-10-CM

## 2020-11-30 DIAGNOSIS — E03.9 ACQUIRED HYPOTHYROIDISM: ICD-10-CM

## 2020-11-30 PROCEDURE — 99214 OFFICE O/P EST MOD 30 MIN: CPT | Performed by: FAMILY MEDICINE

## 2020-11-30 PROCEDURE — 71046 X-RAY EXAM CHEST 2 VIEWS: CPT

## 2020-12-02 PROCEDURE — 93000 ELECTROCARDIOGRAM COMPLETE: CPT | Performed by: FAMILY MEDICINE

## 2020-12-09 ENCOUNTER — LAB (OUTPATIENT)
Dept: LAB | Facility: CLINIC | Age: 85
End: 2020-12-09
Payer: MEDICARE

## 2020-12-09 DIAGNOSIS — I10 ESSENTIAL HYPERTENSION: ICD-10-CM

## 2020-12-09 DIAGNOSIS — E78.2 MIXED HYPERLIPIDEMIA: ICD-10-CM

## 2020-12-09 DIAGNOSIS — R53.83 FATIGUE, UNSPECIFIED TYPE: ICD-10-CM

## 2020-12-09 LAB
ALBUMIN SERPL BCP-MCNC: 3.5 G/DL (ref 3.5–5)
ALP SERPL-CCNC: 49 U/L (ref 46–116)
ALT SERPL W P-5'-P-CCNC: 17 U/L (ref 12–78)
ANION GAP SERPL CALCULATED.3IONS-SCNC: 4 MMOL/L (ref 4–13)
AST SERPL W P-5'-P-CCNC: 11 U/L (ref 5–45)
BASOPHILS # BLD AUTO: 0.04 THOUSANDS/ΜL (ref 0–0.1)
BASOPHILS NFR BLD AUTO: 1 % (ref 0–1)
BILIRUB SERPL-MCNC: 0.68 MG/DL (ref 0.2–1)
BUN SERPL-MCNC: 19 MG/DL (ref 5–25)
CALCIUM SERPL-MCNC: 9.4 MG/DL (ref 8.3–10.1)
CHLORIDE SERPL-SCNC: 106 MMOL/L (ref 100–108)
CHOLEST SERPL-MCNC: 152 MG/DL (ref 50–200)
CO2 SERPL-SCNC: 29 MMOL/L (ref 21–32)
CREAT SERPL-MCNC: 0.8 MG/DL (ref 0.6–1.3)
EOSINOPHIL # BLD AUTO: 0.28 THOUSAND/ΜL (ref 0–0.61)
EOSINOPHIL NFR BLD AUTO: 5 % (ref 0–6)
ERYTHROCYTE [DISTWIDTH] IN BLOOD BY AUTOMATED COUNT: 14.3 % (ref 11.6–15.1)
GFR SERPL CREATININE-BSD FRML MDRD: 68 ML/MIN/1.73SQ M
GLUCOSE P FAST SERPL-MCNC: 94 MG/DL (ref 65–99)
HCT VFR BLD AUTO: 44.4 % (ref 34.8–46.1)
HDLC SERPL-MCNC: 58 MG/DL
HGB BLD-MCNC: 14.2 G/DL (ref 11.5–15.4)
IMM GRANULOCYTES # BLD AUTO: 0.01 THOUSAND/UL (ref 0–0.2)
IMM GRANULOCYTES NFR BLD AUTO: 0 % (ref 0–2)
LDLC SERPL CALC-MCNC: 75 MG/DL (ref 0–100)
LYMPHOCYTES # BLD AUTO: 1.59 THOUSANDS/ΜL (ref 0.6–4.47)
LYMPHOCYTES NFR BLD AUTO: 31 % (ref 14–44)
MCH RBC QN AUTO: 29.3 PG (ref 26.8–34.3)
MCHC RBC AUTO-ENTMCNC: 32 G/DL (ref 31.4–37.4)
MCV RBC AUTO: 92 FL (ref 82–98)
MONOCYTES # BLD AUTO: 0.54 THOUSAND/ΜL (ref 0.17–1.22)
MONOCYTES NFR BLD AUTO: 10 % (ref 4–12)
NEUTROPHILS # BLD AUTO: 2.75 THOUSANDS/ΜL (ref 1.85–7.62)
NEUTS SEG NFR BLD AUTO: 53 % (ref 43–75)
NRBC BLD AUTO-RTO: 0 /100 WBCS
PLATELET # BLD AUTO: 193 THOUSANDS/UL (ref 149–390)
PMV BLD AUTO: 11.7 FL (ref 8.9–12.7)
POTASSIUM SERPL-SCNC: 4.1 MMOL/L (ref 3.5–5.3)
PROT SERPL-MCNC: 6.6 G/DL (ref 6.4–8.2)
RBC # BLD AUTO: 4.85 MILLION/UL (ref 3.81–5.12)
SODIUM SERPL-SCNC: 139 MMOL/L (ref 136–145)
TRIGL SERPL-MCNC: 95 MG/DL
TSH SERPL DL<=0.05 MIU/L-ACNC: 3.05 UIU/ML (ref 0.36–3.74)
WBC # BLD AUTO: 5.21 THOUSAND/UL (ref 4.31–10.16)

## 2020-12-09 PROCEDURE — 80061 LIPID PANEL: CPT

## 2020-12-09 PROCEDURE — 84443 ASSAY THYROID STIM HORMONE: CPT

## 2020-12-09 PROCEDURE — 80053 COMPREHEN METABOLIC PANEL: CPT

## 2020-12-09 PROCEDURE — 36415 COLL VENOUS BLD VENIPUNCTURE: CPT

## 2020-12-09 PROCEDURE — 85025 COMPLETE CBC W/AUTO DIFF WBC: CPT

## 2021-01-26 ENCOUNTER — OFFICE VISIT (OUTPATIENT)
Dept: FAMILY MEDICINE CLINIC | Facility: CLINIC | Age: 86
End: 2021-01-26
Payer: MEDICARE

## 2021-01-26 VITALS
TEMPERATURE: 98.6 F | RESPIRATION RATE: 18 BRPM | OXYGEN SATURATION: 91 % | DIASTOLIC BLOOD PRESSURE: 90 MMHG | HEIGHT: 62 IN | HEART RATE: 68 BPM | BODY MASS INDEX: 27.31 KG/M2 | WEIGHT: 148.4 LBS | SYSTOLIC BLOOD PRESSURE: 146 MMHG

## 2021-01-26 DIAGNOSIS — K62.89 RECTAL PAIN: ICD-10-CM

## 2021-01-26 DIAGNOSIS — K62.5 RECTAL BLEEDING: Primary | ICD-10-CM

## 2021-01-26 PROCEDURE — 99213 OFFICE O/P EST LOW 20 MIN: CPT | Performed by: FAMILY MEDICINE

## 2021-01-26 RX ORDER — HYDROCORTISONE ACETATE 25 MG/1
25 SUPPOSITORY RECTAL 2 TIMES DAILY
Qty: 12 SUPPOSITORY | Refills: 0 | Status: SHIPPED | OUTPATIENT
Start: 2021-01-26 | End: 2021-04-05 | Stop reason: ALTCHOICE

## 2021-01-26 NOTE — PROGRESS NOTES
Assessment/Plan:  Patient is an 82year old female who has had rectal pain and blood on the toilet paper after a BM  She denies any blood in the toilet bowl or underwear  The pain is not necessarily associated with having a BM  On rectal exam today, there was no stool in the rectum and no blood  Hemoccult was negative  I explained to patient that it is possible that she has hemorrhoids - I will RX Anusol suppositories, but I would also like her to go back to rectal surgeon  Her last colonoscopy was in 2016 with Dr Darrall Spurling  It showed a hyperplastic polyp  Refer to Dr Lito Fuller ( last colonoscopy was 2016 - hyperplastic polyp)   Diagnoses and all orders for this visit:    Rectal bleeding  -     hydrocortisone (ANUSOL-HC) 25 mg suppository; Insert 1 suppository (25 mg total) into the rectum 2 (two) times a day  -     Ambulatory referral to Colorectal Surgery; Future    Rectal pain  -     hydrocortisone (ANUSOL-HC) 25 mg suppository; Insert 1 suppository (25 mg total) into the rectum 2 (two) times a day  -     Ambulatory referral to Colorectal Surgery; Future          Subjective:   Chief Complaint   Patient presents with    Rectal Bleeding        Patient ID: Jaren Price is a 82 y o  female  Patient with pain in rectal area - has had bleeding for a couple months - blood is only on toilet paper when she has a BM  Patient had colonoscopy - 2016 - one polyp - hyperplastic - Dr Darrall Spurling  The following portions of the patient's history were reviewed and updated as appropriate: allergies, current medications, past family history, past medical history, past social history, past surgical history and problem list     Review of Systems   Constitutional: Negative for chills and fever  HENT: Negative  Respiratory: Negative  Cardiovascular: Negative  Gastrointestinal: Positive for anal bleeding and rectal pain  Negative for abdominal pain and blood in stool  Genitourinary: Negative for difficulty urinating  Musculoskeletal: Positive for back pain  Skin: Negative for rash  Neurological: Negative for dizziness  Psychiatric/Behavioral: Negative  Objective:      /90 (BP Location: Left arm, Patient Position: Sitting)   Pulse 68   Temp 98 6 °F (37 °C) (Tympanic)   Resp 18   Ht 5' 1 5" (1 562 m)   Wt 67 3 kg (148 lb 6 4 oz)   SpO2 91%   BMI 27 59 kg/m²          Physical Exam  Vitals signs and nursing note reviewed  Constitutional:       General: She is not in acute distress  HENT:      Head: Normocephalic  Abdominal:      General: Bowel sounds are normal       Palpations: Abdomen is soft  Tenderness: There is no abdominal tenderness  Genitourinary:     Comments: Possible hemorrhoid  No blood on glove from rectal exam, but no stool either  Heme test neg  Skin:     General: Skin is warm and dry  Findings: No rash  Neurological:      Mental Status: She is alert and oriented to person, place, and time     Psychiatric:         Mood and Affect: Mood normal

## 2021-02-01 ENCOUNTER — VBI (OUTPATIENT)
Dept: ADMINISTRATIVE | Facility: OTHER | Age: 86
End: 2021-02-01

## 2021-02-05 DIAGNOSIS — E03.9 ACQUIRED HYPOTHYROIDISM: ICD-10-CM

## 2021-02-06 RX ORDER — LEVOTHYROXINE SODIUM 0.03 MG/1
25 TABLET ORAL DAILY
Qty: 30 TABLET | Refills: 5 | Status: SHIPPED | OUTPATIENT
Start: 2021-02-06 | End: 2021-08-12

## 2021-02-18 DIAGNOSIS — I10 ESSENTIAL HYPERTENSION: ICD-10-CM

## 2021-02-18 RX ORDER — METOPROLOL SUCCINATE 100 MG/1
100 TABLET, EXTENDED RELEASE ORAL DAILY
Qty: 90 TABLET | Refills: 1 | Status: SHIPPED | OUTPATIENT
Start: 2021-02-18 | End: 2021-08-12

## 2021-02-18 NOTE — TELEPHONE ENCOUNTER
What was she taking at her last visit  Now that I look at it, we've been assuming that she's taking Losartan but it has not been filled for two years?

## 2021-02-18 NOTE — TELEPHONE ENCOUNTER
Pt called asking if she's supposed to be taking metoprolol and losartan or if she can discontinue metoprolol because she's 'having a hard time getting it refilled'  I advised pt that she just needs a new script, [facility] only has so many refills approved by us before she has to call for more  Please send refill to [facility]

## 2021-02-19 DIAGNOSIS — I10 ESSENTIAL HYPERTENSION: Primary | ICD-10-CM

## 2021-02-19 RX ORDER — LOSARTAN POTASSIUM 100 MG/1
100 TABLET ORAL DAILY
Qty: 90 TABLET | Refills: 1 | Status: SHIPPED | OUTPATIENT
Start: 2021-02-19 | End: 2021-09-20

## 2021-02-19 NOTE — TELEPHONE ENCOUNTER
Call patient - I am going to send a prescription for Losartan to the pharmacy also and she should take both of them  She should have an appointment in April for check of her blood pressure and AWV

## 2021-04-05 ENCOUNTER — OFFICE VISIT (OUTPATIENT)
Dept: FAMILY MEDICINE CLINIC | Facility: CLINIC | Age: 86
End: 2021-04-05
Payer: MEDICARE

## 2021-04-05 VITALS
TEMPERATURE: 98.6 F | HEART RATE: 77 BPM | SYSTOLIC BLOOD PRESSURE: 144 MMHG | OXYGEN SATURATION: 92 % | BODY MASS INDEX: 27.53 KG/M2 | WEIGHT: 149.6 LBS | HEIGHT: 62 IN | RESPIRATION RATE: 18 BRPM | DIASTOLIC BLOOD PRESSURE: 84 MMHG

## 2021-04-05 DIAGNOSIS — I10 ESSENTIAL HYPERTENSION: ICD-10-CM

## 2021-04-05 DIAGNOSIS — Z00.00 MEDICARE ANNUAL WELLNESS VISIT, SUBSEQUENT: ICD-10-CM

## 2021-04-05 DIAGNOSIS — Z78.0 POST-MENOPAUSE: ICD-10-CM

## 2021-04-05 DIAGNOSIS — S61.552A CAT BITE OF LEFT WRIST, INITIAL ENCOUNTER: ICD-10-CM
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
## 2021-04-05 DIAGNOSIS — E78.2 MIXED HYPERLIPIDEMIA: ICD-10-CM

## 2021-04-05 DIAGNOSIS — Z78.0 POST-MENOPAUSAL: ICD-10-CM

## 2021-04-05 DIAGNOSIS — C43.9 MELANOMA OF SKIN (HCC): ICD-10-CM

## 2021-04-05 DIAGNOSIS — F33.9 DEPRESSION, RECURRENT (HCC): ICD-10-CM

## 2021-04-05 DIAGNOSIS — M81.0 AGE-RELATED OSTEOPOROSIS WITHOUT CURRENT PATHOLOGICAL FRACTURE: ICD-10-CM

## 2021-04-05 DIAGNOSIS — J43.2 CENTRILOBULAR EMPHYSEMA (HCC): ICD-10-CM

## 2021-04-05 DIAGNOSIS — R05.9 COUGH: ICD-10-CM

## 2021-04-05 DIAGNOSIS — Z13.820 SCREENING FOR OSTEOPOROSIS: Primary | ICD-10-CM

## 2021-04-05 PROBLEM — M79.7 FIBROMYOSITIS: Status: ACTIVE | Noted: 2021-04-05

## 2021-04-05 PROBLEM — M19.91 LOCALIZED, PRIMARY OSTEOARTHRITIS: Status: ACTIVE | Noted: 2021-04-05

## 2021-04-05 PROBLEM — G25.0 ESSENTIAL TREMOR: Status: ACTIVE | Noted: 2019-02-07

## 2021-04-05 PROBLEM — M47.816 LUMBAR SPONDYLOSIS: Status: ACTIVE | Noted: 2021-04-05

## 2021-04-05 PROBLEM — I26.99 PULMONARY EMBOLISM (HCC): Status: RESOLVED | Noted: 2017-08-07 | Resolved: 2021-04-05

## 2021-04-05 PROBLEM — D35.00 ADRENAL ADENOMA: Status: ACTIVE | Noted: 2017-03-02

## 2021-04-05 PROBLEM — M19.219 LOCALIZED, SECONDARY OSTEOARTHRITIS OF THE SHOULDER REGION: Status: ACTIVE | Noted: 2021-04-05

## 2021-04-05 PROCEDURE — 1123F ACP DISCUSS/DSCN MKR DOCD: CPT | Performed by: FAMILY MEDICINE

## 2021-04-05 PROCEDURE — G0439 PPPS, SUBSEQ VISIT: HCPCS | Performed by: FAMILY MEDICINE

## 2021-04-05 PROCEDURE — 99214 OFFICE O/P EST MOD 30 MIN: CPT | Performed by: FAMILY MEDICINE

## 2021-04-05 RX ORDER — MONTELUKAST SODIUM 10 MG/1
10 TABLET ORAL
Qty: 30 TABLET | Refills: 5 | Status: SHIPPED | OUTPATIENT
Start: 2021-04-05 | End: 2021-08-15 | Stop reason: ALTCHOICE

## 2021-04-05 RX ORDER — AMOXICILLIN AND CLAVULANATE POTASSIUM 875; 125 MG/1; MG/1
1 TABLET, FILM COATED ORAL EVERY 12 HOURS SCHEDULED
Qty: 20 TABLET | Refills: 0 | Status: SHIPPED | OUTPATIENT
Start: 2021-04-05 | End: 2021-04-15

## 2021-04-05 RX ORDER — ASCORBIC ACID 125 MG
TABLET,CHEWABLE ORAL
COMMUNITY

## 2021-04-05 RX ORDER — SIMVASTATIN 20 MG
20 TABLET ORAL
Qty: 90 TABLET | Refills: 1 | Status: SHIPPED | OUTPATIENT
Start: 2021-04-05 | End: 2021-11-15

## 2021-04-05 NOTE — PATIENT INSTRUCTIONS
Medicare Preventive Visit Patient Instructions  Thank you for completing your Welcome to Medicare Visit or Medicare Annual Wellness Visit today  Your next wellness visit will be due in one year (4/6/2022)  The screening/preventive services that you may require over the next 5-10 years are detailed below  Some tests may not apply to you based off risk factors and/or age  Screening tests ordered at today's visit but not completed yet may show as past due  Also, please note that scanned in results may not display below  Preventive Screenings:  Service Recommendations Previous Testing/Comments   Colorectal Cancer Screening  * Colonoscopy    * Fecal Occult Blood Test (FOBT)/Fecal Immunochemical Test (FIT)  * Fecal DNA/Cologuard Test  * Flexible Sigmoidoscopy Age: 54-65 years old   Colonoscopy: every 10 years (may be performed more frequently if at higher risk)  OR  FOBT/FIT: every 1 year  OR  Cologuard: every 3 years  OR  Sigmoidoscopy: every 5 years  Screening may be recommended earlier than age 48 if at higher risk for colorectal cancer  Also, an individualized decision between you and your healthcare provider will decide whether screening between the ages of 74-80 would be appropriate  Colonoscopy: Not on file  FOBT/FIT: Not on file  Cologuard: Not on file  Sigmoidoscopy: Not on file          Breast Cancer Screening Age: 36 years old  Frequency: every 1-2 years  Not required if history of left and right mastectomy Mammogram: Not on file        Cervical Cancer Screening Between the ages of 21-29, pap smear recommended once every 3 years  Between the ages of 33-67, can perform pap smear with HPV co-testing every 5 years     Recommendations may differ for women with a history of total hysterectomy, cervical cancer, or abnormal pap smears in past  Pap Smear: Not on file    Screening Not Indicated   Hepatitis C Screening Once for adults born between [facility]  More frequently in patients at high risk for Hepatitis C Hep C Antibody: Not on file        Diabetes Screening 1-2 times per year if you're at risk for diabetes or have pre-diabetes Fasting glucose: 94 mg/dL   A1C: 5 7    Screening Current   Cholesterol Screening Once every 5 years if you don't have a lipid disorder  May order more often based on risk factors  Lipid panel: 12/09/2020    Screening Not Indicated  History Lipid Disorder     Other Preventive Screenings Covered by Medicare:  1  Abdominal Aortic Aneurysm (AAA) Screening: covered once if your at risk  You're considered to be at risk if you have a family history of AAA  2  Lung Cancer Screening: covers low dose CT scan once per year if you meet all of the following conditions: (1) Age 50-69; (2) No signs or symptoms of lung cancer; (3) Current smoker or have quit smoking within the last 15 years; (4) You have a tobacco smoking history of at least 30 pack years (packs per day multiplied by number of years you smoked); (5) You get a written order from a healthcare provider  3  Glaucoma Screening: covered annually if you're considered high risk: (1) You have diabetes OR (2) Family history of glaucoma OR (3)  aged 48 and older OR (3)  American aged 72 and older  3  Osteoporosis Screening: covered every 2 years if you meet one of the following conditions: (1) You're estrogen deficient and at risk for osteoporosis based off medical history and other findings; (2) Have a vertebral abnormality; (3) On glucocorticoid therapy for more than 3 months; (4) Have primary hyperparathyroidism; (5) On osteoporosis medications and need to assess response to drug therapy  · Last bone density test (DXA Scan): Not on file  5  HIV Screening: covered annually if you're between the age of 12-76  Also covered annually if you are younger than 13 and older than 72 with risk factors for HIV infection  For pregnant patients, it is covered up to 3 times per pregnancy      Immunizations:  Immunization Recommendations   Influenza Vaccine Annual influenza vaccination during flu season is recommended for all persons aged >= 6 months who do not have contraindications   Pneumococcal Vaccine (Prevnar and Pneumovax)  * Prevnar = PCV13  * Pneumovax = PPSV23   Adults 25-60 years old: 1-3 doses may be recommended based on certain risk factors  Adults 72 years old: Prevnar (PCV13) vaccine recommended followed by Pneumovax (PPSV23) vaccine  If already received PPSV23 since turning 65, then PCV13 recommended at least one year after PPSV23 dose  Hepatitis B Vaccine 3 dose series if at intermediate or high risk (ex: diabetes, end stage renal disease, liver disease)   Tetanus (Td) Vaccine - COST NOT COVERED BY MEDICARE PART B Following completion of primary series, a booster dose should be given every 10 years to maintain immunity against tetanus  Td may also be given as tetanus wound prophylaxis  Tdap Vaccine - COST NOT COVERED BY MEDICARE PART B Recommended at least once for all adults  For pregnant patients, recommended with each pregnancy  Shingles Vaccine (Shingrix) - COST NOT COVERED BY MEDICARE PART B  2 shot series recommended in those aged 48 and above     Health Maintenance Due:      Topic Date Due    DXA SCAN  Never done     Immunizations Due:  There are no preventive care reminders to display for this patient  Advance Directives   What are advance directives? Advance directives are legal documents that state your wishes and plans for medical care  These plans are made ahead of time in case you lose your ability to make decisions for yourself  Advance directives can apply to any medical decision, such as the treatments you want, and if you want to donate organs  What are the types of advance directives? There are many types of advance directives, and each state has rules about how to use them  You may choose a combination of any of the following:  · Living will:   This is a written record of the treatment you want  You can also choose which treatments you do not want, which to limit, and which to stop at a certain time  This includes surgery, medicine, IV fluid, and tube feedings  · Durable power of  for healthcare [facility]): This is a written record that states who you want to make healthcare choices for you when you are unable to make them for yourself  This person, called a proxy, is usually a family member or a friend  You may choose more than 1 proxy  · Do not resuscitate (DNR) order:  A DNR order is used in case your heart stops beating or you stop breathing  It is a request not to have certain forms of treatment, such as CPR  A DNR order may be included in other types of advance directives  · Medical directive: This covers the care that you want if you are in a coma, near death, or unable to make decisions for yourself  You can list the treatments you want for each condition  Treatment may include pain medicine, surgery, blood transfusions, dialysis, IV or tube feedings, and a ventilator (breathing machine)  · Values history: This document has questions about your views, beliefs, and how you feel and think about life  This information can help others choose the care that you would choose  Why are advance directives important? An advance directive helps you control your care  Although spoken wishes may be used, it is better to have your wishes written down  Spoken wishes can be misunderstood, or not followed  Treatments may be given even if you do not want them  An advance directive may make it easier for your family to make difficult choices about your care  Depression   Depression  is a medical condition that causes feelings of sadness or hopelessness that do not go away  Depression may cause you to lose interest in things you used to enjoy  These feelings may interfere with your daily life    Call your local emergency number (911 in the [address]) if:   · You think about harming yourself or someone else   · You have done something on purpose to hurt yourself  The following resources are available at any time to help you, if needed:   · [address] [facility]: 3-789.698.1878 (8-432-271-QQYL)   · Suicide Hotline: 6-284.636.8637 (6-933-UYHBEGB)   · For a list of international numbers: https://save org/find-help/international-resources/  Treatment for depression may include medicine to relieve depression  Medicine is often used together with therapy  Therapy is a way for you to talk about your feelings and anything that may be causing depression  Therapy can be done alone or in a group  It may also be done with family members or a significant other  · Get regular physical activity  · Create a regular sleep schedule  · Eat a variety of healthy foods  · Do not drink alcohol or use drugs  Urinary Incontinence   Urinary incontinence (UI)  is when you lose control of your bladder  UI develops because your bladder cannot store or empty urine properly  The 3 most common types of UI are stress incontinence, urge incontinence, or both  Medicines:   · May be given to help strengthen your bladder control  Report any side effects of medication to your healthcare provider  Do pelvic muscle exercises often:  Your pelvic muscles help you stop urinating  Squeeze these muscles tight for 5 seconds, then relax for 5 seconds  Gradually work up to squeezing for 10 seconds  Do 3 sets of 15 repetitions a day, or as directed  This will help strengthen your pelvic muscles and improve bladder control  Train your bladder:  Go to the bathroom at set times, such as every 2 hours, even if you do not feel the urge to go  You can also try to hold your urine when you feel the urge to go  For example, hold your urine for 5 minutes when you feel the urge to go  As that becomes easier, hold your urine for 10 minutes  Self-care:   · Keep a UI record  Write down how often you leak urine and how much you leak   Make a note of what you were doing when you leaked urine  · Drink liquids as directed  You may need to limit the amount of liquid you drink to help control your urine leakage  Do not drink any liquid right before you go to bed  Limit or do not have drinks that contain caffeine or alcohol  · Prevent constipation  Eat a variety of high-fiber foods  Good examples are high-fiber cereals, beans, vegetables, and whole-grain breads  Walking is the best way to trigger your intestines to have a bowel movement  · Exercise regularly and maintain a healthy weight  Weight loss and exercise will decrease pressure on your bladder and help you control your leakage  · Use a catheter as directed  to help empty your bladder  A catheter is a tiny, plastic tube that is put into your bladder to drain your urine  · Go to behavior therapy as directed  Behavior therapy may be used to help you learn to control your urge to urinate  Weight Management   Why it is important to manage your weight:  Being overweight increases your risk of health conditions such as heart disease, high blood pressure, type 2 diabetes, and certain types of cancer  It can also increase your risk for osteoarthritis, sleep apnea, and other respiratory problems  Aim for a slow, steady weight loss  Even a small amount of weight loss can lower your risk of health problems  How to lose weight safely:  A safe and healthy way to lose weight is to eat fewer calories and get regular exercise  You can lose up about 1 pound a week by decreasing the number of calories you eat by 500 calories each day  Healthy meal plan for weight management:  A healthy meal plan includes a variety of foods, contains fewer calories, and helps you stay healthy  A healthy meal plan includes the following:  · Eat whole-grain foods more often  A healthy meal plan should contain fiber  Fiber is the part of grains, fruits, and vegetables that is not broken down by your body   Whole-grain foods are healthy and provide extra fiber in your diet  Some examples of whole-grain foods are whole-wheat breads and pastas, oatmeal, brown rice, and bulgur  · Eat a variety of vegetables every day  Include dark, leafy greens such as spinach, kale, nellie greens, and mustard greens  Eat yellow and orange vegetables such as carrots, sweet potatoes, and winter squash  · Eat a variety of fruits every day  Choose fresh or canned fruit (canned in its own juice or light syrup) instead of juice  Fruit juice has very little or no fiber  · Eat low-fat dairy foods  Drink fat-free (skim) milk or 1% milk  Eat fat-free yogurt and low-fat cottage cheese  Try low-fat cheeses such as mozzarella and other reduced-fat cheeses  · Choose meat and other protein foods that are low in fat  Choose beans or other legumes such as split peas or lentils  Choose fish, skinless poultry (chicken or turkey), or lean cuts of red meat (beef or pork)  Before you cook meat or poultry, cut off any visible fat  · Use less fat and oil  Try baking foods instead of frying them  Add less fat, such as margarine, sour cream, regular salad dressing and mayonnaise to foods  Eat fewer high-fat foods  Some examples of high-fat foods include french fries, doughnuts, ice cream, and cakes  · Eat fewer sweets  Limit foods and drinks that are high in sugar  This includes candy, cookies, regular soda, and sweetened drinks  Exercise:  Exercise at least 30 minutes per day on most days of the week  Some examples of exercise include walking, biking, dancing, and swimming  You can also fit in more physical activity by taking the stairs instead of the elevator or parking farther away from stores  Ask your healthcare provider about the best exercise plan for you  © Copyright [employer] 2018 Information is for End User's use only and may not be sold, redistributed or otherwise used for commercial purposes   All illustrations and images included in CareNotes® are the copyrighted property of A D A M , Inc  or [address] [place], [place] [address]iglesia zac

## 2021-04-05 NOTE — PROGRESS NOTES
Assessment/Plan:    Patient complains of tremor  She did not want to be referred to Neurology  We discussed her her her remaining chronic medical conditions  Tremor affects her writing  Has to hold cup of coffee with two hands  Recheck in 4 months  Diagnoses and all orders for this visit:    Screening for osteoporosis    Mixed hyperlipidemia  -     simvastatin (ZOCOR) 20 mg tablet; Take 1 tablet (20 mg total) by mouth daily at bedtime  -     Lipid Panel with Direct LDL reflex; Future  -     Comprehensive metabolic panel; Future  -     TSH, 3rd generation with Free T4 reflex; Future  -     CBC and differential; Future    Post-menopausal  -     DXA bone density spine hip and pelvis; Future    Essential hypertension  -     Lipid Panel with Direct LDL reflex; Future  -     Comprehensive metabolic panel; Future  -     TSH, 3rd generation with Free T4 reflex; Future  -     CBC and differential; Future    Post-menopause    Age-related osteoporosis without current pathological fracture  -     DXA bone density spine hip and pelvis; Future    Medicare annual wellness visit, subsequent    Cat bite of left wrist, initial encounter  -     amoxicillin-clavulanate (AUGMENTIN) 875-125 mg per tablet; Take 1 tablet by mouth every 12 (twelve) hours for 10 days    Centrilobular emphysema (HCC)    Depression, recurrent ([address] )  -     Lipid Panel with Direct LDL reflex; Future  -     Comprehensive metabolic panel; Future  -     TSH, 3rd generation with Free T4 reflex; Future  -     CBC and differential; Future    Melanoma of skin (HCC)    Cough  -     montelukast (SINGULAIR) 10 mg tablet; Take 1 tablet (10 mg total) by mouth daily at bedtime    Other orders  -     Cyanocobalamin (B-12) 5000 MCG CAPS; Take by mouth          Subjective:   Chief Complaint   Patient presents with    Follow-up    Medicare Wellness Visit        Patient ID: Dulce Damon is a 82 y o  female      Patietn is feeling a little depressed, also with post nasal draiange  Also feels fozia  Has to hold her coffe cup with two hands  The following portions of the patient's history were reviewed and updated as appropriate: allergies, current medications, past family history, past medical history, past social history, past surgical history and problem list     Review of Systems   Constitutional: Negative for chills and fever  HENT: Negative for congestion and sore throat  Respiratory: Negative for chest tightness  Cardiovascular: Negative for chest pain and palpitations  Gastrointestinal: Negative for abdominal pain, constipation, diarrhea and nausea  Genitourinary: Negative for difficulty urinating  Skin: Negative  Neurological: Positive for tremors  Negative for dizziness and headaches  Psychiatric/Behavioral: Positive for dysphoric mood  Objective:      /84 (BP Location: Left arm, Patient Position: Sitting)   Pulse 77   Temp 98 6 °F (37 °C) (Tympanic)   Resp 18   Ht 5' 1 5" (1 562 m)   Wt 67 9 kg (149 lb 9 6 oz)   SpO2 92%   BMI 27 81 kg/m²          Physical Exam  Vitals signs and nursing note reviewed  Constitutional:       General: She is not in acute distress  Neck:      Thyroid: No thyromegaly  Cardiovascular:      Rate and Rhythm: Normal rate and regular rhythm  Heart sounds: Normal heart sounds  Pulmonary:      Effort: Pulmonary effort is normal       Breath sounds: Normal breath sounds  Lymphadenopathy:      Cervical: No cervical adenopathy  Skin:     General: Skin is warm and dry  Neurological:      Mental Status: She is alert and oriented to person, place, and time        Comments:   Does not have tremor on exam

## 2021-04-05 NOTE — PROGRESS NOTES
Assessment and Plan:     Patient is seen for subsequent Medicare exam   Problem List Items Addressed This Visit        Respiratory    Centrilobular emphysema (HCC)    Relevant Medications    montelukast (SINGULAIR) 10 mg tablet       Cardiovascular and Mediastinum    Essential hypertension    Relevant Orders    Lipid Panel with Direct LDL reflex    Comprehensive metabolic panel    TSH, 3rd generation with Free T4 reflex    CBC and differential       Musculoskeletal and Integument    Melanoma of skin (HCC)    Osteoporosis    Relevant Orders    DXA bone density spine hip and pelvis       Other    Mixed hyperlipidemia    Relevant Medications    simvastatin (ZOCOR) 20 mg tablet    Other Relevant Orders    Lipid Panel with Direct LDL reflex    Comprehensive metabolic panel    TSH, 3rd generation with Free T4 reflex    CBC and differential    Cough    Relevant Medications    montelukast (SINGULAIR) 10 mg tablet    Depression, recurrent (HCC)    Relevant Orders    Lipid Panel with Direct LDL reflex    Comprehensive metabolic panel    TSH, 3rd generation with Free T4 reflex    CBC and differential      Other Visit Diagnoses     Screening for osteoporosis    -  Primary    Post-menopausal        Relevant Orders    DXA bone density spine hip and pelvis    Post-menopause        Medicare annual wellness visit, subsequent        Cat bite of left wrist, initial encounter            BMI Counseling: Body mass index is 27 81 kg/m²  The BMI is above normal  Nutrition recommendations include encouraging healthy choices of fruits and vegetables, moderation in carbohydrate intake, increasing intake of lean protein and reducing intake of saturated and trans fat  Exercise recommendations include moderate physical activity 150 minutes/week  No pharmacotherapy was ordered  Preventive health issues were discussed with patient, and age appropriate screening tests were ordered as noted in patient's After Visit Summary    Personalized health advice and appropriate referrals for health education or preventive services given if needed, as noted in patient's After Visit Summary  History of Present Illness:     Patient presents for  Medicare visit  Patient Care Team:  Tam Garcia DO as PCP - General (Family Medicine)     Review of Systems:     Review of Systems   Problem List:     Patient Active Problem List   Diagnosis    Essential hypertension    Mixed hyperlipidemia    Urge incontinence    Spinal stenosis    Shoulder pain    Secondary osteoarthritis of shoulder, left    Peripheral vascular disease ([address] )    Acquired hypothyroidism    Centrilobular emphysema ([address] )    Allergic rhinitis    Esophageal reflux    Enthesopathy of hip region    Diastolic dysfunction    Degeneration of intervertebral disc of lumbosacral region    Cough    Chondromalacia patellae    Fatigue    Adrenal adenoma    Essential tremor    Fibromyalgia    Fibromyositis    Hearing loss    Impaired fasting glucose    Localized, primary osteoarthritis    Localized, secondary osteoarthritis of the shoulder region    Lumbar spondylosis    Lumbosacral radiculitis    Melanoma of [facility] ([facility] )    Osteoporosis    Sleep disturbance    Depression, recurrent ([facility] )      Past Medical and Surgical History:     Past Medical History:   Diagnosis Date    Pulmonary embolism ([facility] ) 8/7/2017    Last Assessment & Plan:  Due to persistent clot/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 patient should be on indefinite anticoagulation until the  risk outweighs the benefit  Message sent to Dr Bhaskar Barrow office regarding the same       Past Surgical History:   Procedure Laterality Date    HYSTERECTOMY      TOTAL SHOULDER ARTHROPLASTY Left       Family History:     Family History   Problem Relation Age of Onset    Heart attack Mother     Cerebral aneurysm Father     Diabetes Sister       Social History:     E-Cigarette/Vaping    E-Cigarette Use Never User      E-Cigarette/Vaping Substances    Nicotine No     THC No     CBD No     Flavoring No     Other No     Unknown No      Social History     Socioeconomic History    Marital status: /Civil Union     Spouse name: None    Number of children: None    Years of education: None    Highest education level: None   Occupational History    None   Social Needs    Financial resource strain: None    Food insecurity     Worry: None     Inability: None    Transportation needs     Medical: None     Non-medical: None   Tobacco Use    Smoking status: Former Smoker    Smokeless tobacco: Never Used   Substance and Sexual Activity    Alcohol use: Not Currently    Drug use: Never    Sexual activity: None   Lifestyle    Physical activity     Days per week: None     Minutes per session: None    Stress: None   Relationships    Social connections     Talks on phone: None     Gets together: None     Attends Christian service: None     Active member of club or organization: None     Attends meetings of clubs or organizations: None     Relationship status: None    Intimate partner violence     Fear of current or ex partner: None     Emotionally abused: None     Physically abused: None     Forced sexual activity: None   Other Topics Concern    None   Social History Narrative    None      Medications and Allergies:     Current Outpatient Medications   Medication Sig Dispense Refill    aspirin (ECOTRIN LOW STRENGTH) 81 mg EC tablet Take 81 mg by mouth daily      Cholecalciferol (VITAMIN D3) 5000 units CAPS Take by mouth      citalopram (CeleXA) 20 mg tablet       Cyanocobalamin (B-12) 5000 MCG CAPS Take by mouth      levothyroxine (Euthyrox) 25 mcg tablet Take 1 tablet (25 mcg total) by mouth daily 30 tablet 5    losartan (COZAAR) 100 MG tablet Take 1 tablet (100 mg total) by mouth daily 90 tablet 1    metoprolol succinate (TOPROL-XL) 100 mg 24 hr tablet Take 1 tablet (100 mg total) by mouth daily 90 tablet 1    Polyethyl Glycol-Propyl Glycol (SYSTANE OP) Apply to eye 2 (two) times a day      PROAIR  (90 Base) MCG/ACT inhaler       simvastatin (ZOCOR) 20 mg tablet Take 1 tablet (20 mg total) by mouth daily at bedtime 90 tablet 1    Spiriva Respimat 2 5 MCG/ACT AERS inhaler Inhale 2 puffs daily 2 Inhaler 3    hydrocortisone 2 5 % cream       montelukast (SINGULAIR) 10 mg tablet Take 1 tablet (10 mg total) by mouth daily at bedtime 30 tablet 5    valACYclovir (VALTREX) 1,000 mg tablet Take 1 tablet (1,000 mg total) by mouth 3 (three) times a day for 7 days 21 tablet 0    zoledronic acid (RECLAST) 5 mg/100 mL IV infusion (premix) Infuse 5 mg into a venous catheter once       No current facility-administered medications for this visit  No Known Allergies   Immunizations:     Immunization History   Administered Date(s) Administered    INFLUENZA 10/29/2009, 09/12/2011, 10/25/2012, 10/10/2013, 11/08/2014, 09/27/2016, 10/12/2017, 10/30/2018, 10/12/2020    Influenza Split High Dose Preservative Free IM 09/27/2016    Influenza, seasonal, injectable, preservative free 10/01/2019    Pneumococcal Conjugate 13-Valent 11/17/2015    Pneumococcal Polysaccharide PPV23 11/24/2009    SARS-CoV-2 / COVID-19 mRNA IM (Jie Cambria) 01/25/2021, 03/01/2021    Tdap 08/09/2012, 02/18/2017      Health Maintenance:         Topic Date Due    DXA SCAN  Never done     There are no preventive care reminders to display for this patient  Medicare Screening Tests and Risk Assessments:     Waldon Rinne is here for her Subsequent Wellness visit  Last Medicare Wellness visit information reviewed, patient interviewed and updates made to the record today  Health Risk Assessment:   Patient rates overall health as fair  Patient feels that their physical health rating is same  Patient is satisfied with their life  Eyesight was rated as same  Hearing was rated as slightly worse  Patient feels that their emotional and mental health rating is slightly worse  Patients states they are never, rarely angry  Patient states they are never, rarely unusually tired/fatigued  Pain experienced in the last 7 days has been some  Patient's pain rating has been 5/10  Patient states that she has experienced no weight loss or gain in last 6 months  Depression Screening:   PHQ-2 Score: 4  PHQ-9 Score: 12      Fall Risk Screening: In the past year, patient has experienced: no history of falling in past year      Urinary Incontinence Screening:   Patient has leaked urine accidently in the last six months  Home Safety:  Patient does not have trouble with stairs inside or outside of their home  Patient has working smoke alarms and has no working carbon monoxide detector  Home safety hazards include: loose rugs on the floor  Nutrition:   Current diet is Regular and Limited junk food  Medications:   Patient is currently taking over-the-counter supplements  OTC medications include: see medication list  Patient is able to manage medications  Activities of Daily Living (ADLs)/Instrumental Activities of Daily Living (IADLs):   Walk and transfer into and out of bed and chair?: Yes  Dress and groom yourself?: Yes    Bathe or shower yourself?: Yes    Feed yourself? Yes  Do your laundry/housekeeping?: Yes  Manage your money, pay your bills and track your expenses?: Yes  Make your own meals?: Yes    Do your own shopping?: Yes    Previous Hospitalizations:   Any hospitalizations or ED visits within the last 12 months?: No      Advance Care Planning:   Living will: Yes    Durable POA for healthcare:  Yes    Advanced directive: Yes    End of Life Decisions reviewed with patient: Yes      Cognitive Screening:   Provider or family/friend/caregiver concerned regarding cognition?: No    PREVENTIVE SCREENINGS      Cardiovascular Screening:    General: Screening Not Indicated and History Lipid Disorder      Diabetes Screening:     General: Screening Current      Breast Cancer Screening: General: Patient Declines      Cervical Cancer Screening:    General: Screening Not Indicated      Osteoporosis Screening:    General: Screening Not Indicated and History Osteoporosis      Abdominal Aortic Aneurysm (AAA) Screening:        General: Screening Not Indicated      Lung Cancer Screening:     General: Screening Not Indicated      Hepatitis C Screening:    General: Screening Not Indicated    Screening, Brief Intervention, and Referral to Treatment (SBIRT)    Screening  Typical number of drinks in a day: 0  Typical number of drinks in a week: 0  Interpretation: Low risk drinking behavior      Single Item Drug Screening:  How often have you used an illegal drug (including marijuana) or a prescription medication for non-medical reasons in the past year? never    Single Item Drug Screen Score: 0  Interpretation: Negative screen for possible drug use disorder    No exam data present     Physical Exam:     /84 (BP Location: Left arm, Patient Position: Sitting)   Pulse 77   Temp 98 6 °F (37 °C) (Tympanic)   Resp 18   Ht 5' 1 5" (1 562 m)   Wt 67 9 kg (149 lb 9 6 oz)   SpO2 92%   BMI 27 81 kg/m²     Physical Exam     Lonny Barrera,

## 2021-04-11 ENCOUNTER — NURSE TRIAGE (OUTPATIENT)
Dept: OTHER | Facility: OTHER | Age: 86
End: 2021-04-11

## 2021-04-11 NOTE — TELEPHONE ENCOUNTER
Regardin83 Y/O CAT BITE REDNESS TALI BITE CONCERN   ----- Message from Lulú Rust sent at 2021 12:08 PM EDT -----  "I was bit by my cat, and sometimes I am allergic   The bite the bleeding stopped but there is redness around it "

## 2021-04-11 NOTE — TELEPHONE ENCOUNTER
Disposition is to be seen in 4 hours for cat bite puncture wound with redness and hand tingling  Pt will be going to [facility] ED for evaluation

## 2021-04-11 NOTE — TELEPHONE ENCOUNTER
Reason for Disposition   [1] Puncture wound (hole through the skin) AND [2] from a cat bite (or deep claw puncture wound)    Additional Information   Negative: [1] Any break in skin from BITE (e g , cut, puncture or scratch) AND[2] PET animial (e g , dog, cat, or ferret) at risk for RABIES (e g , sick, stray, unprovoked bite, developing country)    Answer Assessment - Initial Assessment Questions  1  ANIMAL: "What type of animal caused the bite?" "Is the injury from a bite or a claw?" If the animal is a dog or a cat, ask: "Was it a pet or a stray?" "Was it acting ill or behaving strangely?"      Her pet cat bit her and it opened the skin  2  LOCATION: "Where is the bite located?"       On her left hand  3  SIZE: "How big is the bite?" "What does it look like?"       It is dime size for redness  4  ONSET: "When did the bite happen?" (Minutes or hours ago)       She was bit today  5  CIRCUMSTANCES: "Tell me how this happened "       She was playing and he bite her but it broke the skin  Bleeding has stopped  6  TETANUS: "When was the last tetanus booster?"    Hand is having mild tingling and redness around the puncture      Protocols used: ANIMAL BITE-ADULT-

## 2021-04-26 ENCOUNTER — TELEPHONE (OUTPATIENT)
Dept: FAMILY MEDICINE CLINIC | Facility: CLINIC | Age: 86
End: 2021-04-26

## 2021-04-26 NOTE — TELEPHONE ENCOUNTER
Call patient  She can try stopping the montelukast   We prescribed for allergies, cough and also would help with breathing and asthma  If the symptoms worsen, she will have to let us know

## 2021-05-11 DIAGNOSIS — F33.9 DEPRESSION, RECURRENT (HCC): Primary | ICD-10-CM

## 2021-05-12 RX ORDER — CITALOPRAM 20 MG/1
20 TABLET ORAL DAILY
Qty: 90 TABLET | Refills: 1 | Status: SHIPPED | OUTPATIENT
Start: 2021-05-12 | End: 2021-11-15

## 2021-05-17 DIAGNOSIS — J43.2 CENTRILOBULAR EMPHYSEMA (HCC): ICD-10-CM

## 2021-05-17 RX ORDER — TIOTROPIUM BROMIDE INHALATION SPRAY 3.12 UG/1
SPRAY, METERED RESPIRATORY (INHALATION)
Qty: 8 G | Refills: 2 | Status: SHIPPED | OUTPATIENT
Start: 2021-05-17 | End: 2022-02-04

## 2021-08-09 ENCOUNTER — OFFICE VISIT (OUTPATIENT)
Dept: FAMILY MEDICINE CLINIC | Facility: CLINIC | Age: 86
End: 2021-08-09
Payer: MEDICARE

## 2021-08-09 VITALS
HEIGHT: 62 IN | BODY MASS INDEX: 27.42 KG/M2 | SYSTOLIC BLOOD PRESSURE: 158 MMHG | WEIGHT: 149 LBS | TEMPERATURE: 98.2 F | DIASTOLIC BLOOD PRESSURE: 70 MMHG | OXYGEN SATURATION: 93 % | RESPIRATION RATE: 16 BRPM | HEART RATE: 68 BPM

## 2021-08-09 DIAGNOSIS — I10 ESSENTIAL HYPERTENSION: ICD-10-CM

## 2021-08-09 DIAGNOSIS — R73.01 IMPAIRED FASTING GLUCOSE: ICD-10-CM

## 2021-08-09 DIAGNOSIS — E03.9 ACQUIRED HYPOTHYROIDISM: Primary | ICD-10-CM

## 2021-08-09 DIAGNOSIS — E78.2 MIXED HYPERLIPIDEMIA: ICD-10-CM

## 2021-08-09 DIAGNOSIS — I73.9 PERIPHERAL VASCULAR DISEASE (HCC): ICD-10-CM

## 2021-08-09 DIAGNOSIS — R32 URINARY INCONTINENCE, UNSPECIFIED TYPE: ICD-10-CM

## 2021-08-09 DIAGNOSIS — R41.3 MEMORY DISTURBANCE: ICD-10-CM

## 2021-08-09 DIAGNOSIS — R26.9 GAIT DISTURBANCE: ICD-10-CM

## 2021-08-09 PROCEDURE — 99214 OFFICE O/P EST MOD 30 MIN: CPT | Performed by: FAMILY MEDICINE

## 2021-08-09 NOTE — PROGRESS NOTES
Assessment/Plan:    Patient is a 5year-old female seen for chronic medical conditions  She has multiple complaints today which include issues with her memory urinary incontinence problems with gait and tremor  I am going to check out an MRI to rule out hydrocephalus  I am also checking blood work to include thyroid and B12  At her last visit, I had recommended a consult with Neurology regarding her tremors but she declined  She chalks all of these things up to old age  She will make an appointment for her next regular check up in December  Diagnoses and all orders for this visit:    Acquired hypothyroidism  -     TSH, 3rd generation with Free T4 reflex; Future    Impaired fasting glucose  -     HEMOGLOBIN A1C W/ EAG ESTIMATION; Future    Essential hypertension  -     CBC and differential; Future  -     Comprehensive metabolic panel; Future    Mixed hyperlipidemia  -     Lipid Panel with Direct LDL reflex; Future    Memory disturbance  -     MRI brain wo contrast; Future  -     Vitamin B12; Future    Urinary incontinence, unspecified type  -     MRI brain wo contrast; Future  -     UA (URINE) with reflex to Scope; Future  -     Urine culture; Future    Gait disturbance  -     MRI brain wo contrast; Future  -     Vitamin B12; Future    Peripheral vascular disease (HCC)          Subjective:   Chief Complaint   Patient presents with    Follow-up     4M        Patient ID: Gabbie Mckeon is a 82 y o  female  Chava Arn is scheduled for follow up of chronic medical conditions  She also complains of fatigue, back pain, problems with memory, no control of urine and shakiness  Has to wear a pad everyday because of urinary incontinence        The following portions of the patient's history were reviewed and updated as appropriate: allergies, current medications, past family history, past medical history, past social history, past surgical history and problem list     Review of Systems   Constitutional: Positive for fatigue  Negative for chills and fever  HENT: Negative for congestion and sore throat  Respiratory: Negative for chest tightness  Cardiovascular: Negative for chest pain and palpitations  Gastrointestinal: Negative for abdominal pain, constipation, diarrhea and nausea  Genitourinary: Negative for difficulty urinating  Urinary incontinence   Musculoskeletal: Positive for back pain  Skin: Negative  Neurological: Positive for tremors  Negative for dizziness and headaches  Memory disturbacne   Psychiatric/Behavioral: Negative  Objective:      /70 (BP Location: Left arm, Patient Position: Sitting, Cuff Size: Standard)   Pulse 68   Temp 98 2 °F (36 8 °C) (Tympanic)   Resp 16   Ht 5' 1 5" (1 562 m)   Wt 67 6 kg (149 lb)   SpO2 93%   BMI 27 70 kg/m²          Physical Exam  Vitals and nursing note reviewed  Constitutional:       General: She is not in acute distress  Appearance: She is overweight  HENT:      Head: Normocephalic  Neck:      Thyroid: No thyromegaly  Cardiovascular:      Rate and Rhythm: Normal rate and regular rhythm  Heart sounds: Normal heart sounds  Pulmonary:      Effort: Pulmonary effort is normal       Breath sounds: Normal breath sounds  Musculoskeletal:      Right lower leg: No edema  Left lower leg: No edema  Lymphadenopathy:      Cervical: No cervical adenopathy  Skin:     General: Skin is warm and dry  Neurological:      Mental Status: She is alert and oriented to person, place, and time  Motor: Tremor present     Psychiatric:         Mood and Affect: Mood normal

## 2021-08-12 DIAGNOSIS — I10 ESSENTIAL HYPERTENSION: ICD-10-CM

## 2021-08-12 DIAGNOSIS — E03.9 ACQUIRED HYPOTHYROIDISM: ICD-10-CM

## 2021-08-12 RX ORDER — LEVOTHYROXINE SODIUM 0.03 MG/1
25 TABLET ORAL DAILY
Qty: 90 TABLET | Refills: 0 | Status: SHIPPED | OUTPATIENT
Start: 2021-08-12 | End: 2021-11-15

## 2021-08-12 RX ORDER — METOPROLOL SUCCINATE 100 MG/1
TABLET, EXTENDED RELEASE ORAL
Qty: 90 TABLET | Refills: 0 | Status: SHIPPED | OUTPATIENT
Start: 2021-08-12 | End: 2021-11-15

## 2021-08-13 ENCOUNTER — APPOINTMENT (OUTPATIENT)
Dept: LAB | Facility: CLINIC | Age: 86
End: 2021-08-13
Payer: MEDICARE

## 2021-08-13 DIAGNOSIS — R26.9 GAIT DISTURBANCE: ICD-10-CM

## 2021-08-13 DIAGNOSIS — E78.2 MIXED HYPERLIPIDEMIA: ICD-10-CM

## 2021-08-13 DIAGNOSIS — E03.9 ACQUIRED HYPOTHYROIDISM: ICD-10-CM

## 2021-08-13 DIAGNOSIS — I10 ESSENTIAL HYPERTENSION: ICD-10-CM

## 2021-08-13 DIAGNOSIS — R41.3 MEMORY DISTURBANCE: ICD-10-CM

## 2021-08-13 DIAGNOSIS — R32 URINARY INCONTINENCE, UNSPECIFIED TYPE: ICD-10-CM

## 2021-08-13 DIAGNOSIS — R73.01 IMPAIRED FASTING GLUCOSE: ICD-10-CM

## 2021-08-13 LAB
ALBUMIN SERPL BCP-MCNC: 3.1 G/DL (ref 3.5–5)
ALP SERPL-CCNC: 47 U/L (ref 46–116)
ALT SERPL W P-5'-P-CCNC: 19 U/L (ref 12–78)
ANION GAP SERPL CALCULATED.3IONS-SCNC: 3 MMOL/L (ref 4–13)
AST SERPL W P-5'-P-CCNC: 15 U/L (ref 5–45)
BASOPHILS # BLD AUTO: 0.04 THOUSANDS/ΜL (ref 0–0.1)
BASOPHILS NFR BLD AUTO: 1 % (ref 0–1)
BILIRUB SERPL-MCNC: 0.65 MG/DL (ref 0.2–1)
BILIRUB UR QL STRIP: NEGATIVE
BUN SERPL-MCNC: 16 MG/DL (ref 5–25)
CALCIUM ALBUM COR SERPL-MCNC: 9.7 MG/DL (ref 8.3–10.1)
CALCIUM SERPL-MCNC: 9 MG/DL (ref 8.3–10.1)
CHLORIDE SERPL-SCNC: 105 MMOL/L (ref 100–108)
CHOLEST SERPL-MCNC: 163 MG/DL (ref 50–200)
CLARITY UR: CLEAR
CO2 SERPL-SCNC: 29 MMOL/L (ref 21–32)
COLOR UR: YELLOW
CREAT SERPL-MCNC: 0.73 MG/DL (ref 0.6–1.3)
EOSINOPHIL # BLD AUTO: 0.32 THOUSAND/ΜL (ref 0–0.61)
EOSINOPHIL NFR BLD AUTO: 5 % (ref 0–6)
ERYTHROCYTE [DISTWIDTH] IN BLOOD BY AUTOMATED COUNT: 13.7 % (ref 11.6–15.1)
EST. AVERAGE GLUCOSE BLD GHB EST-MCNC: 114 MG/DL
GFR SERPL CREATININE-BSD FRML MDRD: 76 ML/MIN/1.73SQ M
GLUCOSE P FAST SERPL-MCNC: 91 MG/DL (ref 65–99)
GLUCOSE UR STRIP-MCNC: NEGATIVE MG/DL
HBA1C MFR BLD: 5.6 %
HCT VFR BLD AUTO: 42.9 % (ref 34.8–46.1)
HDLC SERPL-MCNC: 66 MG/DL
HGB BLD-MCNC: 14.1 G/DL (ref 11.5–15.4)
HGB UR QL STRIP.AUTO: NEGATIVE
IMM GRANULOCYTES # BLD AUTO: 0.01 THOUSAND/UL (ref 0–0.2)
IMM GRANULOCYTES NFR BLD AUTO: 0 % (ref 0–2)
KETONES UR STRIP-MCNC: NEGATIVE MG/DL
LDLC SERPL CALC-MCNC: 79 MG/DL (ref 0–100)
LEUKOCYTE ESTERASE UR QL STRIP: NEGATIVE
LYMPHOCYTES # BLD AUTO: 1.73 THOUSANDS/ΜL (ref 0.6–4.47)
LYMPHOCYTES NFR BLD AUTO: 29 % (ref 14–44)
MCH RBC QN AUTO: 30.1 PG (ref 26.8–34.3)
MCHC RBC AUTO-ENTMCNC: 32.9 G/DL (ref 31.4–37.4)
MCV RBC AUTO: 92 FL (ref 82–98)
MONOCYTES # BLD AUTO: 0.69 THOUSAND/ΜL (ref 0.17–1.22)
MONOCYTES NFR BLD AUTO: 11 % (ref 4–12)
NEUTROPHILS # BLD AUTO: 3.28 THOUSANDS/ΜL (ref 1.85–7.62)
NEUTS SEG NFR BLD AUTO: 54 % (ref 43–75)
NITRITE UR QL STRIP: NEGATIVE
NRBC BLD AUTO-RTO: 0 /100 WBCS
PH UR STRIP.AUTO: 7 [PH]
PLATELET # BLD AUTO: 210 THOUSANDS/UL (ref 149–390)
PMV BLD AUTO: 12 FL (ref 8.9–12.7)
POTASSIUM SERPL-SCNC: 4 MMOL/L (ref 3.5–5.3)
PROT SERPL-MCNC: 6.7 G/DL (ref 6.4–8.2)
PROT UR STRIP-MCNC: NEGATIVE MG/DL
RBC # BLD AUTO: 4.68 MILLION/UL (ref 3.81–5.12)
SODIUM SERPL-SCNC: 137 MMOL/L (ref 136–145)
SP GR UR STRIP.AUTO: 1.01 (ref 1–1.03)
TRIGL SERPL-MCNC: 91 MG/DL
TSH SERPL DL<=0.05 MIU/L-ACNC: 2.02 UIU/ML (ref 0.36–3.74)
UROBILINOGEN UR QL STRIP.AUTO: 1 E.U./DL
VIT B12 SERPL-MCNC: >6000 PG/ML (ref 100–900)
WBC # BLD AUTO: 6.07 THOUSAND/UL (ref 4.31–10.16)

## 2021-08-13 PROCEDURE — 85025 COMPLETE CBC W/AUTO DIFF WBC: CPT

## 2021-08-13 PROCEDURE — 82607 VITAMIN B-12: CPT

## 2021-08-13 PROCEDURE — 87086 URINE CULTURE/COLONY COUNT: CPT

## 2021-08-13 PROCEDURE — 80061 LIPID PANEL: CPT

## 2021-08-13 PROCEDURE — 36415 COLL VENOUS BLD VENIPUNCTURE: CPT

## 2021-08-13 PROCEDURE — 80053 COMPREHEN METABOLIC PANEL: CPT

## 2021-08-13 PROCEDURE — 83036 HEMOGLOBIN GLYCOSYLATED A1C: CPT

## 2021-08-13 PROCEDURE — 84443 ASSAY THYROID STIM HORMONE: CPT

## 2021-08-13 PROCEDURE — 81003 URINALYSIS AUTO W/O SCOPE: CPT

## 2021-08-14 LAB — BACTERIA UR CULT: NORMAL

## 2021-08-16 ENCOUNTER — TELEPHONE (OUTPATIENT)
Dept: FAMILY MEDICINE CLINIC | Facility: CLINIC | Age: 86
End: 2021-08-16

## 2021-08-16 NOTE — TELEPHONE ENCOUNTER
Call patient regarding her blood work results  Kristy Second blood sugar was normal   Her kidney and liver function are fine   Her cholesterol is good at 163 and LDL 79   Her thyroid function is in range   She should continue her same dose of thyroid medication and cholesterol medication  Kristy Second blood count is fine and her B12 was very high   Does she take a B12 supplement? Missy Beth urine culture was negative for infection   I see that she is scheduled for her MRI next week   We will call her when those resuts are available

## 2021-08-16 NOTE — TELEPHONE ENCOUNTER
Patient made aware of results and recommendation  She will temporarily stop taking her B12 5000mcg until otherwise told

## 2021-08-21 ENCOUNTER — HOSPITAL ENCOUNTER (OUTPATIENT)
Dept: MRI IMAGING | Facility: HOSPITAL | Age: 86
Discharge: HOME | End: 2021-08-21
Payer: MEDICARE

## 2021-08-21 DIAGNOSIS — R41.3 MEMORY DISTURBANCE: ICD-10-CM

## 2021-08-21 DIAGNOSIS — R26.9 GAIT DISTURBANCE: ICD-10-CM

## 2021-08-21 DIAGNOSIS — R32 URINARY INCONTINENCE, UNSPECIFIED TYPE: ICD-10-CM

## 2021-08-21 PROCEDURE — 70551 MRI BRAIN STEM W/O DYE: CPT

## 2021-09-19 DIAGNOSIS — I10 ESSENTIAL HYPERTENSION: ICD-10-CM

## 2021-09-20 RX ORDER — LOSARTAN POTASSIUM 100 MG/1
TABLET ORAL
Qty: 90 TABLET | Refills: 1 | Status: SHIPPED | OUTPATIENT
Start: 2021-09-20 | End: 2021-09-22

## 2021-09-22 DIAGNOSIS — I10 ESSENTIAL HYPERTENSION: ICD-10-CM

## 2021-09-22 RX ORDER — LOSARTAN POTASSIUM 100 MG/1
TABLET ORAL
Qty: 90 TABLET | Refills: 0 | Status: SHIPPED | OUTPATIENT
Start: 2021-09-22 | End: 2022-01-05 | Stop reason: SDUPTHER

## 2021-10-09 ENCOUNTER — OFFICE VISIT (OUTPATIENT)
Dept: URGENT CARE | Facility: MEDICAL CENTER | Age: 86
End: 2021-10-09
Payer: MEDICARE

## 2021-10-09 VITALS
HEIGHT: 62 IN | RESPIRATION RATE: 20 BRPM | OXYGEN SATURATION: 96 % | DIASTOLIC BLOOD PRESSURE: 88 MMHG | TEMPERATURE: 98.2 F | HEART RATE: 66 BPM | BODY MASS INDEX: 26.87 KG/M2 | WEIGHT: 146 LBS | SYSTOLIC BLOOD PRESSURE: 207 MMHG

## 2021-10-09 DIAGNOSIS — T14.8XXA SKIN AVULSION: ICD-10-CM

## 2021-10-09 DIAGNOSIS — L03.113 CELLULITIS OF RIGHT UPPER EXTREMITY: Primary | ICD-10-CM

## 2021-10-09 PROCEDURE — 99213 OFFICE O/P EST LOW 20 MIN: CPT | Performed by: FAMILY MEDICINE

## 2021-10-09 PROCEDURE — G0463 HOSPITAL OUTPT CLINIC VISIT: HCPCS | Performed by: FAMILY MEDICINE

## 2021-10-09 RX ORDER — CEPHALEXIN 500 MG/1
500 CAPSULE ORAL EVERY 12 HOURS SCHEDULED
Qty: 14 CAPSULE | Refills: 0 | Status: SHIPPED | OUTPATIENT
Start: 2021-10-09 | End: 2021-10-16

## 2021-11-14 DIAGNOSIS — F33.9 DEPRESSION, RECURRENT (HCC): ICD-10-CM

## 2021-11-14 DIAGNOSIS — I10 ESSENTIAL HYPERTENSION: ICD-10-CM

## 2021-11-14 DIAGNOSIS — E78.2 MIXED HYPERLIPIDEMIA: ICD-10-CM

## 2021-11-15 DIAGNOSIS — E03.9 ACQUIRED HYPOTHYROIDISM: ICD-10-CM

## 2021-11-15 RX ORDER — CITALOPRAM 20 MG/1
TABLET ORAL
Qty: 90 TABLET | Refills: 0 | Status: SHIPPED | OUTPATIENT
Start: 2021-11-15 | End: 2022-01-25 | Stop reason: ALTCHOICE

## 2021-11-15 RX ORDER — LEVOTHYROXINE SODIUM 25 UG/1
TABLET ORAL
Qty: 90 TABLET | Refills: 0 | Status: SHIPPED | OUTPATIENT
Start: 2021-11-15 | End: 2022-03-03

## 2021-11-15 RX ORDER — SIMVASTATIN 20 MG
TABLET ORAL
Qty: 90 TABLET | Refills: 0 | Status: SHIPPED | OUTPATIENT
Start: 2021-11-15 | End: 2022-03-14

## 2021-11-15 RX ORDER — METOPROLOL SUCCINATE 100 MG/1
TABLET, EXTENDED RELEASE ORAL
Qty: 90 TABLET | Refills: 0 | Status: SHIPPED | OUTPATIENT
Start: 2021-11-15 | End: 2022-03-14

## 2021-11-19 ENCOUNTER — TELEPHONE (OUTPATIENT)
Dept: FAMILY MEDICINE CLINIC | Facility: CLINIC | Age: 86
End: 2021-11-19

## 2021-11-23 ENCOUNTER — TRANSITIONAL CARE MANAGEMENT (OUTPATIENT)
Dept: FAMILY MEDICINE CLINIC | Facility: CLINIC | Age: 86
End: 2021-11-23

## 2022-01-03 ENCOUNTER — TELEPHONE (OUTPATIENT)
Dept: FAMILY MEDICINE CLINIC | Facility: CLINIC | Age: 87
End: 2022-01-03

## 2022-01-03 NOTE — TELEPHONE ENCOUNTER
Patient's daughter Marsha Amin) is requesting Dr Howard to give her a call regarding her mother and Eliquis   Please give her a call [phone]  Requesting a refill on Eliquis , I don't see on patient mediation list

## 2022-01-04 NOTE — TELEPHONE ENCOUNTER
I spoe with daughter last night and since patient will be flying home, I recommended that she stay on Eliquis until she gets home and sees me

## 2022-01-05 DIAGNOSIS — I10 ESSENTIAL HYPERTENSION: ICD-10-CM

## 2022-01-05 RX ORDER — LOSARTAN POTASSIUM 100 MG/1
100 TABLET ORAL DAILY
Qty: 90 TABLET | Refills: 0 | Status: SHIPPED | OUTPATIENT
Start: 2022-01-05 | End: 2022-07-05

## 2022-01-12 ENCOUNTER — TELEPHONE (OUTPATIENT)
Dept: FAMILY MEDICINE CLINIC | Facility: CLINIC | Age: 87
End: 2022-01-12

## 2022-01-19 ENCOUNTER — TELEPHONE (OUTPATIENT)
Dept: FAMILY MEDICINE CLINIC | Facility: CLINIC | Age: 87
End: 2022-01-19

## 2022-01-21 ENCOUNTER — OFFICE VISIT (OUTPATIENT)
Dept: URGENT CARE | Facility: CLINIC | Age: 87
End: 2022-01-21
Payer: MEDICARE

## 2022-01-21 VITALS
WEIGHT: 144 LBS | BODY MASS INDEX: 26.5 KG/M2 | HEIGHT: 62 IN | RESPIRATION RATE: 16 BRPM | OXYGEN SATURATION: 95 % | TEMPERATURE: 97.4 F | HEART RATE: 86 BPM

## 2022-01-21 DIAGNOSIS — S51.852A CAT BITE OF LEFT FOREARM, INITIAL ENCOUNTER: Primary | ICD-10-CM
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
## 2022-01-21 PROCEDURE — G0463 HOSPITAL OUTPT CLINIC VISIT: HCPCS | Performed by: PHYSICIAN ASSISTANT

## 2022-01-21 PROCEDURE — 99212 OFFICE O/P EST SF 10 MIN: CPT | Performed by: PHYSICIAN ASSISTANT

## 2022-01-21 RX ORDER — AMOXICILLIN AND CLAVULANATE POTASSIUM 875; 125 MG/1; MG/1
1 TABLET, FILM COATED ORAL EVERY 12 HOURS SCHEDULED
Qty: 20 TABLET | Refills: 0 | Status: SHIPPED | OUTPATIENT
Start: 2022-01-21 | End: 2022-01-31

## 2022-01-21 NOTE — PROGRESS NOTES
[facility]    NAME: Deonna Engel is a 82 y o  female  : 1935    MRN: [id number]  DATE: 2022  TIME: 1:15 PM    Assessment and Plan   Cat bite of left forearm, initial encounter Abraham Schwab  1  Cat bite of left forearm, initial encounter  amoxicillin-clavulanate (AUGMENTIN) 875-125 mg per tablet     [facility] animal bite form completed  Patient Instructions   Patient Instructions   Take antibiotic as instructed  Your tetanus vaccine is up-to-date  Watch for signs of advancing infection which would include increased pain swelling redness or any nasty discharge  Seek further medical attention if concerns for advancing infection  Cold compresses as needed  Chief Complaint     Chief Complaint   Patient presents with   [address] [place] [address]     Pt's reports getting bit by her cat on her left forearm earlier today  Pt has redness, swelling in area  Denies fever  History of Present Illness   Deonna Engel presents to the clinic c/o  82-year-old female comes in with cat bite left forearm  She says that her one cat bit her on the arm  She has history of prior reactions to cat bite since says she is allergic to them  She is typically prescribed Augmentin  She believes that her cat is up-to-date on immunizations  Last tetanus 2021  Review of Systems   Review of Systems   Constitutional: Negative  Skin: Positive for color change and wound         Current Medications     Long-Term Medications   Medication Sig Dispense Refill    apixaban (Eliquis) 2 5 mg Take 1 tablet (2 5 mg total) by mouth 2 (two) times a day 180 tablet 0    aspirin (ECOTRIN LOW STRENGTH) 81 mg EC tablet Take 81 mg by mouth daily      Cholecalciferol (VITAMIN D3) 5000 units CAPS Take by mouth      citalopram (CeleXA) 20 mg tablet Take 1 tablet by mouth once daily 90 tablet 0    Cyanocobalamin (B-12) 5000 MCG CAPS Take by mouth      Euthyrox 25 MCG tablet Take 1 tablet by mouth once daily 90 tablet 0    losartan (COZAAR) 100 MG tablet Take 1 tablet (100 mg total) by mouth daily 90 tablet 0    metoprolol succinate (TOPROL-XL) 100 mg 24 hr tablet Take 1 tablet by mouth once daily 90 tablet 0    simvastatin (ZOCOR) 20 mg tablet TAKE 1 TABLET BY MOUTH ONCE DAILY AT BEDTIME 90 tablet 0    Spiriva Respimat 2 5 MCG/ACT AERS inhaler INHALE 2 SPRAY(S) BY MOUTH ONCE DAILY 8 g 2    valACYclovir (VALTREX) 1,000 mg tablet Take 1 tablet (1,000 mg total) by mouth 3 (three) times a day for 7 days (Patient not taking: Reported on 1/21/2022 ) 21 tablet 0    zoledronic acid (RECLAST) 5 mg/100 mL IV infusion (premix) Infuse 5 mg into a venous catheter once         Current Allergies     Allergies as of 01/21/2022    (No Known Allergies)          The following portions of the patient's history were reviewed and updated as appropriate: allergies, current medications, past family history, past medical history, past social history, past surgical history and problem list   Past Medical History:   Diagnosis Date    Depression     Disease of thyroid gland     Hypertension     Pulmonary embolism ([facility] [address] ) 8/7/2017    Last Assessment & Plan:  Due to persistent clot/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 patient should be on indefinite anticoagulation until the  risk outweighs the benefit  Message sent to Dr Sarah Doran office regarding the same  Past Surgical History:   Procedure Laterality Date    HYSTERECTOMY      TOTAL SHOULDER ARTHROPLASTY Left      Family History   Problem Relation Age of Onset    Heart attack Mother     Cerebral aneurysm Father     Diabetes Sister        Objective   Pulse 86   Temp (!) 97 4 °F (36 3 °C) (Tympanic)   Resp 16   Ht 5' 2" (1 575 m)   Wt 65 3 kg (144 lb)   SpO2 95%   BMI 26 34 kg/m²   No LMP recorded  Patient is postmenopausal        Physical Exam     Physical Exam  Vitals and nursing note reviewed  Constitutional:       General: She is not in acute distress       Appearance: Normal appearance  She is well-developed  She is not ill-appearing, toxic-appearing or diaphoretic  Cardiovascular:      Rate and Rhythm: Normal rate  Pulmonary:      Effort: Pulmonary effort is normal  No respiratory distress  Skin:     General: Skin is warm  Findings: Erythema and lesion present  Comments: Small superficial puncture wound left upper forearm with surrounding redness  Senile purpuric lesions noted on arm  No current heat, induration or gross TTP  Neurological:      Mental Status: She is alert and oriented to person, place, and time     Psychiatric:         Mood and Affect: Mood normal          Behavior: Behavior normal

## 2022-01-21 NOTE — PATIENT INSTRUCTIONS
Take antibiotic as instructed  Your tetanus vaccine is up-to-date  Watch for signs of advancing infection which would include increased pain swelling redness or any nasty discharge  Seek further medical attention if concerns for advancing infection  Cold compresses as needed

## 2022-01-24 RX ORDER — AMOXICILLIN 250 MG
1 CAPSULE ORAL 2 TIMES DAILY
COMMUNITY
Start: 2021-11-18 | End: 2022-11-18

## 2022-01-24 RX ORDER — ACETAMINOPHEN 500 MG
1000 TABLET ORAL EVERY 8 HOURS
COMMUNITY
Start: 2021-11-18

## 2022-01-25 ENCOUNTER — OFFICE VISIT (OUTPATIENT)
Dept: FAMILY MEDICINE CLINIC | Facility: CLINIC | Age: 87
End: 2022-01-25
Payer: MEDICARE

## 2022-01-25 VITALS
HEART RATE: 86 BPM | TEMPERATURE: 97.7 F | HEIGHT: 62 IN | SYSTOLIC BLOOD PRESSURE: 128 MMHG | RESPIRATION RATE: 18 BRPM | DIASTOLIC BLOOD PRESSURE: 70 MMHG | BODY MASS INDEX: 26.61 KG/M2 | OXYGEN SATURATION: 97 % | WEIGHT: 144.6 LBS

## 2022-01-25 DIAGNOSIS — E78.2 MIXED HYPERLIPIDEMIA: ICD-10-CM

## 2022-01-25 DIAGNOSIS — R26.89 BALANCE PROBLEM: ICD-10-CM

## 2022-01-25 DIAGNOSIS — M81.0 AGE-RELATED OSTEOPOROSIS WITHOUT CURRENT PATHOLOGICAL FRACTURE: ICD-10-CM

## 2022-01-25 DIAGNOSIS — I10 ESSENTIAL HYPERTENSION: ICD-10-CM

## 2022-01-25 DIAGNOSIS — R26.2 AMBULATORY DYSFUNCTION: Primary | ICD-10-CM

## 2022-01-25 DIAGNOSIS — J43.2 CENTRILOBULAR EMPHYSEMA (HCC): ICD-10-CM

## 2022-01-25 DIAGNOSIS — I73.9 PERIPHERAL VASCULAR DISEASE (HCC): ICD-10-CM

## 2022-01-25 DIAGNOSIS — Z23 NEED FOR SHINGLES VACCINE: ICD-10-CM

## 2022-01-25 PROBLEM — I67.9 CEREBROVASCULAR DISEASE: Status: ACTIVE | Noted: 2021-11-04

## 2022-01-25 PROBLEM — S81.819A LEG LACERATION: Status: ACTIVE | Noted: 2021-10-27

## 2022-01-25 PROBLEM — S82.141A TIBIAL PLATEAU FRACTURE, RIGHT: Status: ACTIVE | Noted: 2021-10-31

## 2022-01-25 PROBLEM — Z86.711 HISTORY OF PULMONARY EMBOLUS (PE): Status: ACTIVE | Noted: 2021-11-04

## 2022-01-25 PROCEDURE — 99214 OFFICE O/P EST MOD 30 MIN: CPT | Performed by: FAMILY MEDICINE

## 2022-01-25 RX ORDER — ZOSTER VACCINE RECOMBINANT, ADJUVANTED 50 MCG/0.5
0.5 KIT INTRAMUSCULAR ONCE
Qty: 1 EACH | Refills: 1 | Status: SHIPPED | OUTPATIENT
Start: 2022-01-25 | End: 2022-01-25
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
## 2022-01-31 ENCOUNTER — EVALUATION (OUTPATIENT)
Dept: PHYSICAL THERAPY | Facility: CLINIC | Age: 87
End: 2022-01-31
Payer: MEDICARE

## 2022-01-31 DIAGNOSIS — R26.89 BALANCE PROBLEM: ICD-10-CM

## 2022-01-31 DIAGNOSIS — R26.2 AMBULATORY DYSFUNCTION: Primary | ICD-10-CM

## 2022-01-31 PROCEDURE — 97110 THERAPEUTIC EXERCISES: CPT | Performed by: PHYSICAL THERAPIST

## 2022-01-31 PROCEDURE — 97161 PT EVAL LOW COMPLEX 20 MIN: CPT | Performed by: PHYSICAL THERAPIST
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
## 2022-02-03 ENCOUNTER — OFFICE VISIT (OUTPATIENT)
Dept: PHYSICAL THERAPY | Facility: CLINIC | Age: 87
End: 2022-02-03
Payer: MEDICARE

## 2022-02-03 DIAGNOSIS — R26.89 BALANCE PROBLEM: ICD-10-CM

## 2022-02-03 DIAGNOSIS — R26.2 AMBULATORY DYSFUNCTION: Primary | ICD-10-CM

## 2022-02-03 PROCEDURE — 97112 NEUROMUSCULAR REEDUCATION: CPT

## 2022-02-03 PROCEDURE — 97110 THERAPEUTIC EXERCISES: CPT

## 2022-02-03 NOTE — PROGRESS NOTES
Daily Note     Today's date: 2/3/2022  Patient name: Emeterio Brambila  : 1935  MRN: [id number]  Referring provider: Barby Cooper DO  Dx:   Encounter Diagnosis     ICD-10-CM    1  Ambulatory dysfunction  R26 2    2  Balance problem  R26 89                   Subjective: Patient reported being compliant with HEP and had no difficulty with the exercises  She noted that wearing brace on R knee helps with pain and function  Objective: See treatment diary below  Progressed strengthening and balance training as documented below in diary  Assessment: Tolerated treatment well  Patient was challenged with SLS and NBOS EC  She needed multiple seated rest breaks in between exercises  With current strength and balance deficits she remains at risk for falls  Will assess response to treatment next visit and will progress as able  Plan: Continue per plan of care        Precautions: HTN      Manuals 1/31 2/3                                                               Neuro Re-Ed             Tandem stance  30"x3           NBOS Foam   30"x3           NBOS EC  30"x3           Side stepping at rail  4 laps           SLS  20"x3 each leg            Walking w/ head turns  50ftx2 each           NBOS  HEP            Ther Ex             Standing hip abd HEP 2x10 each side Add OTB for NV          Leg press  St 7  45# 2x10           Standing marching  2x10 each side                                                               Pt education AG            Ther Activity             Sit to stands HEP 2x5 from low mat            Mini squats  2x10           Gait Training             SPC fitting AG                         Modalities

## 2022-02-04 DIAGNOSIS — J43.2 CENTRILOBULAR EMPHYSEMA (HCC): ICD-10-CM

## 2022-02-04 RX ORDER — TIOTROPIUM BROMIDE INHALATION SPRAY 3.12 UG/1
SPRAY, METERED RESPIRATORY (INHALATION)
Qty: 8 G | Refills: 0 | Status: SHIPPED | OUTPATIENT
Start: 2022-02-04 | End: 2022-04-07

## 2022-02-07 ENCOUNTER — OFFICE VISIT (OUTPATIENT)
Dept: PHYSICAL THERAPY | Facility: CLINIC | Age: 87
End: 2022-02-07
Payer: MEDICARE

## 2022-02-07 DIAGNOSIS — R26.2 AMBULATORY DYSFUNCTION: Primary | ICD-10-CM

## 2022-02-07 DIAGNOSIS — R26.89 BALANCE PROBLEM: ICD-10-CM

## 2022-02-07 PROCEDURE — 97112 NEUROMUSCULAR REEDUCATION: CPT

## 2022-02-07 PROCEDURE — 97110 THERAPEUTIC EXERCISES: CPT

## 2022-02-07 NOTE — PROGRESS NOTES
Daily Note     Today's date: 2022  Patient name: Afshan Sol  : 1935  MRN: [id number]  Referring provider: Anoop Oakes DO  Dx:   Encounter Diagnosis     ICD-10-CM    1  Ambulatory dysfunction  R26 2    2  Balance problem  R26 89                   Subjective: Patient reported that she is feeling well today  She noted that she is being compliant with her HEP and has no difficulty with the exercises  Objective: See treatment diary below  Progressed strengthening and balance training as documented below in diary  Assessment: Tolerated treatment well  Gave her OTB for home to perform standing hip abduction  Added rockerboard balance into today's treatment which she tolerated well  With current strength and balance deficits she remains at risk for falls  Cued patient on proper form with SLS  Will continue to benefit from skilled physical therapy to help reduce risk for falls and improve safety  Plan: Continue per plan of care        Precautions: HTN      Manuals 1/31 2/3 2/7                                                              Neuro Re-Ed             Tandem stance  30"x3 30" x3           NBOS Foam   30"x3 30" x3          NBOS EC  30"x3 30" x3          Side stepping at rail  4 laps 4 laps           SLS  20"x3 each leg  20" x3 each leg          Walking w/ head turns  50ftx2 each 50ft x2 each          Rockerboard F/B M/L   1 min each          NBOS  HEP            Ther Ex             Standing hip abd HEP 2x10 each side OTB 2x10 each side          Leg press  St 7  45# 2x10 45# 2x10           Standing marching  2x10 each side 2x10 each side                                                              Pt education AG            Ther Activity             Sit to stands HEP 2x5 from low mat  2x5 from low mat          Mini squats  2x10 2x10          Gait Training             SPC fitting AG                         Modalities

## 2022-02-10 ENCOUNTER — OFFICE VISIT (OUTPATIENT)
Dept: PHYSICAL THERAPY | Facility: CLINIC | Age: 87
End: 2022-02-10
Payer: MEDICARE

## 2022-02-10 DIAGNOSIS — R26.89 BALANCE PROBLEM: ICD-10-CM

## 2022-02-10 DIAGNOSIS — R26.2 AMBULATORY DYSFUNCTION: Primary | ICD-10-CM

## 2022-02-10 PROCEDURE — 97112 NEUROMUSCULAR REEDUCATION: CPT

## 2022-02-10 PROCEDURE — 97110 THERAPEUTIC EXERCISES: CPT

## 2022-02-10 NOTE — PROGRESS NOTES
Daily Note     Today's date: 2/10/2022  Patient name: Pedro Killian  : 1935  MRN: [id number]  Referring provider: Tony Gutierrez DO  Dx:   Encounter Diagnosis     ICD-10-CM    1  Ambulatory dysfunction  R26 2    2  Balance problem  R26 89                   Subjective: Patient reported that she is feeling well today  She noted that she is being compliant with her HEP however she has difficulty with standing hip abductions with OTB so she asked if it was okay to stop using it for now  Objective: See treatment diary below  Assessment: Tolerated treatment well  Did not use OTB with standing hip abduction during today's visit due to pain  Patient did very well with rockerboard balance  With current strength and balance deficits she remains at risk for falls  Will continue to benefit from skilled physical therapy to help reduce risk for falls and improve safety  Plan: Continue per plan of care        Precautions: HTN      Manuals 1/31 2/3 2/7 2/10                                                             Neuro Re-Ed             Tandem stance  30"x3 30" x3  30" x3         NBOS Foam   30"x3 30" x3 30" x3         NBOS EC  30"x3 30" x3 30" x3         Side stepping at rail  4 laps 4 laps  4 laps         SLS  20"x3 each leg  20" x3 each leg 20" x3 each leg         Walking w/ head turns  50ftx2 each 50ft x2 each 50ft x2 each         Rockerboard F/B M/L   1 min each 1 min each         NBOS  HEP            Ther Ex             Standing hip abd HEP 2x10 each side OTB 2x10 each side 2x10 each side         Leg press  St 7  45# 2x10 45# 2x10  45# 2x10         Standing marching  2x10 each side 2x10 each side 2x10 each side                                                             Pt education AG            Ther Activity             Sit to stands HEP 2x5 from low mat  2x5 from low mat 2x5 from low mat         Mini squats  2x10 2x10 2x10         Gait Training             SPC fitting AG Modalities

## 2022-02-14 ENCOUNTER — OFFICE VISIT (OUTPATIENT)
Dept: PHYSICAL THERAPY | Facility: CLINIC | Age: 87
End: 2022-02-14
Payer: MEDICARE

## 2022-02-14 DIAGNOSIS — R26.2 AMBULATORY DYSFUNCTION: Primary | ICD-10-CM

## 2022-02-14 DIAGNOSIS — R26.89 BALANCE PROBLEM: ICD-10-CM

## 2022-02-14 PROCEDURE — 97112 NEUROMUSCULAR REEDUCATION: CPT

## 2022-02-14 PROCEDURE — 97530 THERAPEUTIC ACTIVITIES: CPT

## 2022-02-14 PROCEDURE — 97110 THERAPEUTIC EXERCISES: CPT

## 2022-02-14 NOTE — PROGRESS NOTES
Daily Note     Today's date: 2022  Patient name: Elliott Barillas  : 1935  MRN: [id number]  Referring provider: Leonor Giron DO  Dx:   Encounter Diagnosis     ICD-10-CM    1  Ambulatory dysfunction  R26 2    2  Balance problem  R26 89                   Subjective: Patient feels her balance is improving, strength seems to be taking a little longer to progress  No reported LOB or falls since last treatment  Feels that TB for hip abd at home to be a tripping hazard so has not resumed using  Objective: See treatment diary below  Progressed balance and strengthening program as reflected below in treatment diary  Assessment: To work more on strengthening we added the multihip for abd and flex with good tolerance  Minimal muscle fatigue with progression of resistance and reps  No seated rest breaks needed throughout treatment  No gait deviations present with amb activities  Continued close supervision with occasional contact guarding during all balance activities  Will continue to work on improving balance, strength, and endurance to promote safety with amb and reduce fall risk  Plan: Continue per plan of care             Precautions: HTN    Manuals 1/31 2/3 2/7 2/10 2/14                                                            Neuro Re-Ed             Tandem stance  30"x3 30" x3  30" x3 30"x2        NBOS   foam   30"x3 30" x3 30" x3 NP        NBOS EC  30"x3 30" x3 30" x3 30"x3        Side stepping at rail  4 laps 4 laps  4 laps 4 laps        SLS  20"x3 each leg  20" x3 each leg 20" x3 each leg 30"x2  ea        Walking w/ head turns  50ftx2 each 50ft x2 each 50ft x2 each 50 ft  x4  ea        Rockerboard F/B M/L   1 min each 1 min each 1 min  ea        NBOS  HEP                         Ther Ex             Standing hip abd HEP 2x10 each side OTB 2x10 each side 2x10 each side MH  10#  2x10  bilat        Leg press -  St 7  45# 2x10 45# 2x10  45# 2x10 45#  3x10        Standing marching  2x10 each side 2x10 each side 2x10 each side MH  10#  2x10  bilat                                                            Pt education AG            Ther Activity             Sit to stands HEP 2x5 from low mat  2x5 from low mat 2x5 from low mat 2x7 from low mat        Mini squats  2x10 2x10 2x10 2x12                     Gait Training             SPC fitting AG                         Modalities

## 2022-02-17 ENCOUNTER — EVALUATION (OUTPATIENT)
Dept: PHYSICAL THERAPY | Facility: CLINIC | Age: 87
End: 2022-02-17
Payer: MEDICARE

## 2022-02-17 DIAGNOSIS — R26.89 BALANCE PROBLEM: ICD-10-CM

## 2022-02-17 DIAGNOSIS — R26.2 AMBULATORY DYSFUNCTION: Primary | ICD-10-CM

## 2022-02-17 PROCEDURE — 97112 NEUROMUSCULAR REEDUCATION: CPT | Performed by: PHYSICAL THERAPIST

## 2022-02-17 PROCEDURE — 97110 THERAPEUTIC EXERCISES: CPT | Performed by: PHYSICAL THERAPIST

## 2022-02-17 NOTE — PROGRESS NOTES
Daily Note     Today's date: 2022  Patient name: Raji Matute  : 1935  MRN: [id number]  Referring provider: Michel Sharma DO  Dx:   Encounter Diagnosis     ICD-10-CM    1  Ambulatory dysfunction  R26 2    2  Balance problem  R26 89                   Subjective: Pt reports she forgot her cane at home today  States she feels a little off balance without it secondary to her knees  Objective: See treatment diary below  Assessment: Pt performed treatment session without use of SPC  Increased challenge with ambulation and head turns due to normal reliance on [facility]; CGA provided for increased safety  Noted decreased gait speed with horizontal>vertical head turns with decreased step length and occasional decreased step width causing pt to rest and reset her balance  Good tolerance to progression of sit to stands with unstable surface with increased challenge maintaining balance and increased time to complete 10 repetitions consecutively secondary to increased fatigue  Plan: Continue per plan of care  Precautions: HTN    Manuals 1/31 2/3 2/7 2/10 2/14 2/17                                                           Neuro Re-Ed             Tandem stance  30"x3 30" x3  30" x3 30"x2 2x30"       NBOS   foam   30"x3 30" x3 30" x3 NP        NBOS EC  30"x3 30" x3 30" x3 30"x3 3x30"        Side stepping at rail  4 laps 4 laps  4 laps 4 laps x5 on foam No UE support       SLS  20"x3 each leg  20" x3 each leg 20" x3 each leg 30"x2  ea 3x30" ea  Walking w/ head turns  50ftx2 each 50ft x2 each 50ft x2 each 50 ft  x4  ea 2 laps in gym  horizontal and vertical       Rockerboard F/B M/L   1 min each 1 min each 1 min  ea 1' ea  NBOS  HEP                         Ther Ex             Standing hip abd HEP 2x10 each side OTB 2x10 each side 2x10 each side MH  10#  2x10  bilat MH 10# 3x10 ea         Leg press -  St 7  45# 2x10 45# 2x10  45# 2x10 45#  3x10 45#  3x10       Standing marching  2x10 each side 2x10 each side 2x10 each side MH  10#  2x10  bilat MH 10# 3x10 ea                                                              Pt education AG            Ther Activity             Sit to stands HEP 2x5 from low mat  2x5 from low mat 2x5 from low mat 2x7 from low mat 1x10 low mat  2x10 foam under feet from low mat       Mini squats  2x10 2x10 2x10 2x12 3x10 foam                     Gait Training             SPC fitting AG                         Modalities

## 2022-02-21 ENCOUNTER — OFFICE VISIT (OUTPATIENT)
Dept: PHYSICAL THERAPY | Facility: CLINIC | Age: 87
End: 2022-02-21
Payer: MEDICARE

## 2022-02-21 DIAGNOSIS — R26.2 AMBULATORY DYSFUNCTION: Primary | ICD-10-CM

## 2022-02-21 DIAGNOSIS — R26.89 BALANCE PROBLEM: ICD-10-CM

## 2022-02-21 PROCEDURE — 97112 NEUROMUSCULAR REEDUCATION: CPT | Performed by: PHYSICAL THERAPIST

## 2022-02-21 PROCEDURE — 97110 THERAPEUTIC EXERCISES: CPT | Performed by: PHYSICAL THERAPIST

## 2022-02-21 NOTE — PROGRESS NOTES
Daily Note     Today's date: 2022  Patient name: Catarina Antonio  : 1935  MRN: [id number]  Referring provider: Mere Jc DO  Dx:   Encounter Diagnosis     ICD-10-CM    1  Ambulatory dysfunction  R26 2    2  Balance problem  R26 89                   Subjective: Pt reports she did not bring her cane again today and she is using it less at home  States her friends told her they notice a difference in her walking and that she is walking much better since beginning PT  Objective: See treatment diary below  Assessment: Session performed without use of SPC with good tolerance  CGA used throughout session for increased safety  Increased difficulty noted with tandem stance specifically with LLE in back requiring frequent UE support to reset LOB  Compensations present with SLS with resting leg on single limb however improved form noted with verbal cues  Consistent verbal cues required for proper squat form to decrease anterior tibial translation and improve glute activation  Pt continues to make progress with skilled PT and would continue to benefit to maximize function  Plan: Continue per plan of care  Precautions: HTN    Manuals 1/31 2/3 2/7 2/10 2/14 2/17 2/21                                                          Neuro Re-Ed             Tandem stance  30"x3 30" x3  30" x3 30"x2 2x30" 3x30" ea  NBOS   foam   30"x3 30" x3 30" x3 NP        NBOS EC  30"x3 30" x3 30" x3 30"x3 3x30"  4x30"      Side stepping at rail  4 laps 4 laps  4 laps 4 laps x5 on foam No UE support x3 on foam no UE support      SLS  20"x3 each leg  20" x3 each leg 20" x3 each leg 30"x2  ea 3x30" ea  3x30" ea  Walking w/ head turns  50ftx2 each 50ft x2 each 50ft x2 each 50 ft  x4  ea 2 laps in gym  horizontal and vertical 2 laps in gym  horizontal and vertical      Rockerboard F/B M/L   1 min each 1 min each 1 min  ea 1' ea  1' ea        NBOS  HEP                         Ther Ex             Standing hip abd HEP 2x10 each side OTB 2x10 each side 2x10 each side MH  10#  2x10  bilat MH 10# 3x10 ea  MH 10# 3x10 ea  Leg press -  St 7  45# 2x10 45# 2x10  45# 2x10 45#  3x10 45#  3x10 45#  3x10      Standing marching  2x10 each side 2x10 each side 2x10 each side MH  10#  2x10  bilat MH 10# 3x10 ea  MH 10# 3x10 ea                                                            Pt education AG            Ther Activity             Sit to stands HEP 2x5 from low mat  2x5 from low mat 2x5 from low mat 2x7 from low mat 1x10 low mat  2x10 foam under feet from low mat 3x10 foam under feet from low mat      Mini squats  2x10 2x10 2x10 2x12 3x10 foam  3x10 foam   Unilateral UE support                   Gait Training             SPC fitting AG                         Modalities

## 2022-02-24 ENCOUNTER — OFFICE VISIT (OUTPATIENT)
Dept: PHYSICAL THERAPY | Facility: CLINIC | Age: 87
End: 2022-02-24
Payer: MEDICARE

## 2022-02-24 DIAGNOSIS — R26.89 BALANCE PROBLEM: ICD-10-CM

## 2022-02-24 DIAGNOSIS — R26.2 AMBULATORY DYSFUNCTION: Primary | ICD-10-CM

## 2022-02-24 PROCEDURE — 97112 NEUROMUSCULAR REEDUCATION: CPT

## 2022-02-24 PROCEDURE — 97110 THERAPEUTIC EXERCISES: CPT

## 2022-02-24 NOTE — PROGRESS NOTES
Daily Note     Today's date: 2022  Patient name: Deonna Engel  : 1935  MRN: [id number]  Referring provider: Rocco King DO  Dx:   Encounter Diagnosis     ICD-10-CM    1  Ambulatory dysfunction  R26 2    2  Balance problem  R26 89                   Subjective: Pt reports that she is feeling great today  Pt reports she is using her cane less at home  States her friends told her they notice a difference in her walking and that she is walking much better since beginning of PT  Objective: See treatment diary below  Assessment: Tolerated treatment session well  CGA used throughout session for increased safety  Increased difficulty noted with tandem stance specifically with LLE in back requiring frequent UE support to reset LOB  Compensations present with SLS with resting leg on single limb however improved form noted with verbal cues  Consistent verbal cues required for proper squat form to decrease anterior tibial translation and improve glute activation  Pt was most challenged with side stepping on foam and lost her balance multiple times; requiring her to grab hand rails at times for support  Pt continues to make progress with skilled PT and would continue to benefit to help maximize function  Will discuss fitness program with pt during next visit  Plan: Continue per plan of care  Precautions: HTN    Manuals 1/31 2/3 2/7 2/10 2/14 2/17 2/21 2/24                                                         Neuro Re-Ed             Tandem stance  30"x3 30" x3  30" x3 30"x2 2x30" 3x30" ea  3x30" ea  NBOS   foam   30"x3 30" x3 30" x3 NP        NBOS EC  30"x3 30" x3 30" x3 30"x3 3x30"  4x30" 4x30"     Side stepping at rail  4 laps 4 laps  4 laps 4 laps x5 on foam No UE support x3 on foam no UE support x5 on foam no UE support     SLS  20"x3 each leg  20" x3 each leg 20" x3 each leg 30"x2  ea 3x30" ea  3x30" ea  3x30" ea       Walking w/ head turns  50ftx2 each 50ft x2 each 50ft x2 each 50 ft  x4  ea 2 laps in gym  horizontal and vertical 2 laps in gym  horizontal and vertical 2 laps in gym horizontal and vertical     Rockerboard F/B M/L   1 min each 1 min each 1 min  ea 1' ea  1' ea  1' ea  NBOS  HEP                         Ther Ex             Standing hip abd HEP 2x10 each side OTB 2x10 each side 2x10 each side MH  10#  2x10  bilat MH 10# 3x10 ea  MH 10# 3x10 ea  MH 10# 3x10 ea  Leg press -  St 7  45# 2x10 45# 2x10  45# 2x10 45#  3x10 45#  3x10 45#  3x10 45# 3x10     Standing marching  2x10 each side 2x10 each side 2x10 each side MH  10#  2x10  bilat MH 10# 3x10 ea  MH 10# 3x10 ea  MH 10# 3x10 ea                                                           Pt education AG            Ther Activity             Sit to stands HEP 2x5 from low mat  2x5 from low mat 2x5 from low mat 2x7 from low mat 1x10 low mat  2x10 foam under feet from low mat 3x10 foam under feet from low mat 3x10 foam under feet from low mat     Mini squats  2x10 2x10 2x10 2x12 3x10 foam  3x10 foam   Unilateral UE support 3x10 foam Unilateral UE support                  Gait Training             SPC fitting AG                         Modalities

## 2022-02-27 DIAGNOSIS — Z91.89 AT HIGH RISK FOR DEEP VENOUS THROMBOSIS: ICD-10-CM

## 2022-02-28 ENCOUNTER — OFFICE VISIT (OUTPATIENT)
Dept: PHYSICAL THERAPY | Facility: CLINIC | Age: 87
End: 2022-02-28
Payer: MEDICARE

## 2022-02-28 DIAGNOSIS — R26.2 AMBULATORY DYSFUNCTION: Primary | ICD-10-CM

## 2022-02-28 DIAGNOSIS — R26.89 BALANCE PROBLEM: ICD-10-CM

## 2022-02-28 PROCEDURE — 97112 NEUROMUSCULAR REEDUCATION: CPT | Performed by: PHYSICAL THERAPIST

## 2022-02-28 PROCEDURE — 97110 THERAPEUTIC EXERCISES: CPT | Performed by: PHYSICAL THERAPIST

## 2022-02-28 NOTE — PROGRESS NOTES
Daily Note     Today's date: 2022  Patient name: Dulce Damon  : 1935  MRN: [id number]  Referring provider: Juliana Hester DO  Dx:   Encounter Diagnosis     ICD-10-CM    1  Ambulatory dysfunction  R26 2    2  Balance problem  R26 89                   Subjective: Pt reports she continues to see improvements in her balance  States if she is done with therapy she will most likely not be compliant with HEP  Objective: See treatment diary below  Assessment: Discussed with pt POC moving forward and opportunities following physical therapy  Educated pt on fitness program offered at clinic and benefits pt will have if she continues to perform balance activities  Educated pt on the importance of continuing exercise after therapy and performing HEP however based on subjective input, pt would benefit significantly from a personalized fitness program to maintain balance and strength gains from skilled PT  Continues to have increased difficulty with head turns and gait specifically with horizontal > vertical turns with decreased gait speed, decreased step length and increased scissoring gait with head turns; CGA for increased safety  Improvements noted with tandem balance requiring no use of UE support to reset LOB  Increased challenge remains with single limb balance and pt unable to perform without use of UE support  Plan: Continue per plan of care  Progress note during next visit  Precautions: HTN    Manuals 1/31 2/3 2/7 2/10 2/14 2/17 2/21 2/24 2/28                                                        Neuro Re-Ed             Tandem stance  30"x3 30" x3  30" x3 30"x2 2x30" 3x30" ea  3x30" ea  3x30" ea       NBOS   foam   30"x3 30" x3 30" x3 NP        NBOS EC  30"x3 30" x3 30" x3 30"x3 3x30"  4x30" 4x30" 4x30" foam    Side stepping at rail  4 laps 4 laps  4 laps 4 laps x5 on foam No UE support x3 on foam no UE support x5 on foam no UE support     SLS  20"x3 each leg  20" x3 each leg 20" x3 each leg 30"x2  ea 3x30" ea  3x30" ea  3x30" ea  3x30" ea  Walking w/ head turns  50ftx2 each 50ft x2 each 50ft x2 each 50 ft  x4  ea 2 laps in gym  horizontal and vertical 2 laps in gym  horizontal and vertical 2 laps in gym horizontal and vertical 2 laps in gym horizontal and vertical    Rockerboard F/B M/L   1 min each 1 min each 1 min  ea 1' ea  1' ea  1' ea  1' ea  NBOS  HEP                         Ther Ex             Bike          5'    Standing hip abd HEP 2x10 each side OTB 2x10 each side 2x10 each side MH  10#  2x10  bilat MH 10# 3x10 ea  MH 10# 3x10 ea  MH 10# 3x10 ea  MH 25# 3x10 ea  Leg press -  St 7  45# 2x10 45# 2x10  45# 2x10 45#  3x10 45#  3x10 45#  3x10 45# 3x10 NV    Standing marching  2x10 each side 2x10 each side 2x10 each side MH  10#  2x10  bilat MH 10# 3x10 ea  MH 10# 3x10 ea  MH 10# 3x10 ea  MH 25# 3x10 ea                                                          Pt education AG            Ther Activity             Sit to stands HEP 2x5 from low mat  2x5 from low mat 2x5 from low mat 2x7 from low mat 1x10 low mat  2x10 foam under feet from low mat 3x10 foam under feet from low mat 3x10 foam under feet from low mat 3x10 foam under feet from low mat    Mini squats  2x10 2x10 2x10 2x12 3x10 foam  3x10 foam   Unilateral UE support 3x10 foam Unilateral UE support 3x10 foam Unilateral UE support                 Gait Training             SPC fitting AG                         Modalities

## 2022-03-01 RX ORDER — APIXABAN 2.5 MG/1
TABLET, FILM COATED ORAL
Qty: 180 TABLET | Refills: 0 | Status: SHIPPED | OUTPATIENT
Start: 2022-03-01 | End: 2022-06-28

## 2022-03-03 ENCOUNTER — OFFICE VISIT (OUTPATIENT)
Dept: PHYSICAL THERAPY | Facility: CLINIC | Age: 87
End: 2022-03-03
Payer: MEDICARE

## 2022-03-03 DIAGNOSIS — R26.89 BALANCE PROBLEM: ICD-10-CM

## 2022-03-03 DIAGNOSIS — E03.9 ACQUIRED HYPOTHYROIDISM: ICD-10-CM

## 2022-03-03 DIAGNOSIS — R26.2 AMBULATORY DYSFUNCTION: Primary | ICD-10-CM

## 2022-03-03 PROCEDURE — 97110 THERAPEUTIC EXERCISES: CPT | Performed by: PHYSICAL THERAPIST

## 2022-03-03 PROCEDURE — 97112 NEUROMUSCULAR REEDUCATION: CPT | Performed by: PHYSICAL THERAPIST

## 2022-03-03 RX ORDER — LEVOTHYROXINE SODIUM 25 UG/1
TABLET ORAL
Qty: 90 TABLET | Refills: 1 | Status: SHIPPED | OUTPATIENT
Start: 2022-03-03 | End: 2022-08-02
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
## 2022-03-07 ENCOUNTER — OFFICE VISIT (OUTPATIENT)
Dept: PHYSICAL THERAPY | Facility: CLINIC | Age: 87
End: 2022-03-07
Payer: MEDICARE

## 2022-03-07 DIAGNOSIS — R26.2 AMBULATORY DYSFUNCTION: Primary | ICD-10-CM

## 2022-03-07 DIAGNOSIS — R26.89 BALANCE PROBLEM: ICD-10-CM

## 2022-03-07 PROCEDURE — 97112 NEUROMUSCULAR REEDUCATION: CPT

## 2022-03-07 PROCEDURE — 97530 THERAPEUTIC ACTIVITIES: CPT

## 2022-03-07 PROCEDURE — 97110 THERAPEUTIC EXERCISES: CPT

## 2022-03-07 NOTE — PROGRESS NOTES
Daily Note     Today's date: 3/7/2022  Patient name: Erin Springer  : 1935  MRN: [id number]  Referring provider: Jennifer Kumar DO  Dx:   Encounter Diagnosis     ICD-10-CM    1  Ambulatory dysfunction  R26 2    2  Balance problem  R26 89                   Subjective: Patient reports feeling pretty good today  She feels that her balance is continuing to improve  Objective: See treatment diary below      Assessment: Tolerated treatment well  Patient was able to progress to foam with tandem stance  She experienced increased fatigue with sit to stands but was able to complete all sets  Pt remains challenged with SLS and NBOS EC balance; required bilateral hand support  Needed verbal and tactile cues with step ups in order to correct foot sequence  Pt is showing improvement during ambulation with head turns; better control and balance  Would benefit from continued course of skilled physical therapy in order to address impairments and improve function  Plan: Continue per plan of care  Precautions: HTN    Manuals 1/31 2/3 2/7 2/10 2/14 2/17 2/21 2/24 2/28 3/3 3/7                                                           Neuro Re-Ed              Tandem stance  30"x3 30" x3  30" x3 30"x2 2x30" 3x30" ea  3x30" ea  3x30" ea  1x30" ea  2x30" ea foam   NBOS   foam   30"x3 30" x3 30" x3 NP         NBOS EC  30"x3 30" x3 30" x3 30"x3 3x30"  4x30" 4x30" 4x30" foam  4x30" foam   Side stepping at rail  4 laps 4 laps  4 laps 4 laps x5 on foam No UE support x3 on foam no UE support x5 on foam no UE support      SLS  20"x3 each leg  20" x3 each leg 20" x3 each leg 30"x2  ea 3x30" ea  3x30" ea  3x30" ea  3x30" ea  3x30" ea     Walking w/ head turns  50ftx2 each 50ft x2 each 50ft x2 each 50 ft  x4  ea 2 laps in gym  horizontal and vertical 2 laps in gym  horizontal and vertical 2 laps in gym horizontal and vertical 2 laps in gym horizontal and vertical  2 laps in gym horizontal and vertical   Rockerboard F/B M/L   1 min each 1 min each 1 min  ea 1' ea  1' ea  1' ea  1' ea  1' ea   NBOS  HEP             Re-eval           AG    Ther Ex              Bike          5'  5'   Standing hip abd HEP 2x10 each side OTB 2x10 each side 2x10 each side MH  10#  2x10  bilat MH 10# 3x10 ea  MH 10# 3x10 ea  MH 10# 3x10 ea  MH 25# 3x10 ea  MH 25# 3x10 ea  MH 25# 3x10 ea  Leg press -  St 7  45# 2x10 45# 2x10  45# 2x10 45#  3x10 45#  3x10 45#  3x10 45# 3x10 NV 45# 3x10 45# 3x10   Standing marching  2x10 each side 2x10 each side 2x10 each side MH  10#  2x10  bilat MH 10# 3x10 ea  MH 10# 3x10 ea  MH 10# 3x10 ea  MH 25# 3x10 ea  MH 25# 3x10 ea  MH 25# 3x10 ea  Pt education AG             Ther Activity              Sit to stands HEP 2x5 from low mat  2x5 from low mat 2x5 from low mat 2x7 from low mat 1x10 low mat  2x10 foam under feet from low mat 3x10 foam under feet from low mat 3x10 foam under feet from low mat 3x10 foam under feet from low mat 3x10 foam under feet from low mat 3x10 foam under feet from low mat   Mini squats  2x10 2x10 2x10 2x12 3x10 foam  3x10 foam   Unilateral UE support 3x10 foam Unilateral UE support 3x10 foam Unilateral UE support  3x10 on foam unilateral UE support   Step ups           6" 2x10 ea  6" 2x10 ea     Gait Training              SPC fitting AG                           Modalities

## 2022-03-10 ENCOUNTER — OFFICE VISIT (OUTPATIENT)
Dept: PHYSICAL THERAPY | Facility: CLINIC | Age: 87
End: 2022-03-10
Payer: MEDICARE

## 2022-03-10 DIAGNOSIS — R26.2 AMBULATORY DYSFUNCTION: Primary | ICD-10-CM

## 2022-03-10 DIAGNOSIS — R26.89 BALANCE PROBLEM: ICD-10-CM

## 2022-03-10 PROCEDURE — 97112 NEUROMUSCULAR REEDUCATION: CPT

## 2022-03-10 PROCEDURE — 97530 THERAPEUTIC ACTIVITIES: CPT

## 2022-03-10 PROCEDURE — 97110 THERAPEUTIC EXERCISES: CPT

## 2022-03-10 NOTE — PROGRESS NOTES
Daily Note     Today's date: 3/10/2022  Patient name: Raji Matute  : 1935  MRN: [id number]  Referring provider: Michel Sharma DO  Dx:   Encounter Diagnosis     ICD-10-CM    1  Ambulatory dysfunction  R26 2    2  Balance problem  R26 89                   Subjective: Patient reported feeling her balance is improving  R knee continues to be painful with certain movements and activity, no true pain at rest       Objective: See treatment diary below  Assessment: One seated rest break required during program secondary to fatigue  Making both subjective and objective improvement in regards to strength and balance  Good use of ankle strategy with SL balance  Good control without use of UE push off for sit to stands  Will continue to work on endurance and balance for increased safety with amb and activity  Plan: Continue per plan of care  Precautions: HTN    Manuals 3/10    2/14 2/17 2/21 2/24 2/28 3/3 3/7                                                           Neuro Re-Ed              Tandem stance 30"x2  ea    30"x2 2x30" 3x30" ea  3x30" ea  3x30" ea  1x30" ea  2x30" ea foam   NBOS   foam      NP         NBOS EC Foam  30"x4    30"x3 3x30"  4x30" 4x30" 4x30" foam  4x30" foam   Side stepping at rail     4 laps x5 on foam No UE support x3 on foam no UE support x5 on foam no UE support      SLS 30"x2  ea    30"x2  ea 3x30" ea  3x30" ea  3x30" ea  3x30" ea  3x30" ea  Walking w/ head turns Horz/  vert  2 laps ea around gym    50 ft  x4  ea 2 laps in gym  horizontal and vertical 2 laps in gym  horizontal and vertical 2 laps in gym horizontal and vertical 2 laps in gym horizontal and vertical  2 laps in gym horizontal and vertical   Rockerboard F/B M/L 1 min  ea    1 min  ea 1' ea  1' ea  1' ea  1' ea  1' ea   Re-eval           AG                  Ther Ex              Bike  5 min  timer        5'  5'   Standing hip abd MH 25#  3x10  bilat    MH  10#  2x10  bilat MH 10# 3x10 ea   [address] 10# 3x10 ea  MH 10# 3x10 ea  MH 25# 3x10 ea  MH 25# 3x10 ea  MH 25# 3x10 ea  Leg press -  St 7 65#  3x10    45#  3x10 45#  3x10 45#  3x10 45# 3x10 NV 45# 3x10 45# 3x10   Standing marching MH  25#  3x10 bilat    MH  10#  2x10  bilat MH 10# 3x10 ea  MH 10# 3x10 ea  MH 10# 3x10 ea  MH 25# 3x10 ea  MH 25# 3x10 ea  MH 25# 3x10 ea  Pt education              Ther Activity              Sit to stands Foam under feet low mat  3x10    2x7 from low mat 1x10 low mat  2x10 foam under feet from low mat 3x10 foam under feet from low mat 3x10 foam under feet from low mat 3x10 foam under feet from low mat 3x10 foam under feet from low mat 3x10 foam under feet from low mat   Mini squats Foam  unilat HHA  3x10    2x12 3x10 foam  3x10 foam   Unilateral UE support 3x10 foam Unilateral UE support 3x10 foam Unilateral UE support  3x10 on foam unilateral UE support   Step ups  6"  2x10         6" 2x10 ea  6" 2x10 ea                   Gait Training              SPC fitting                            Modalities

## 2022-03-13 DIAGNOSIS — I10 ESSENTIAL HYPERTENSION: ICD-10-CM

## 2022-03-13 DIAGNOSIS — E78.2 MIXED HYPERLIPIDEMIA: ICD-10-CM

## 2022-03-14 ENCOUNTER — OFFICE VISIT (OUTPATIENT)
Dept: PHYSICAL THERAPY | Facility: CLINIC | Age: 87
End: 2022-03-14
Payer: MEDICARE

## 2022-03-14 DIAGNOSIS — R26.2 AMBULATORY DYSFUNCTION: Primary | ICD-10-CM

## 2022-03-14 DIAGNOSIS — R26.89 BALANCE PROBLEM: ICD-10-CM

## 2022-03-14 PROCEDURE — 97112 NEUROMUSCULAR REEDUCATION: CPT | Performed by: PHYSICAL THERAPIST

## 2022-03-14 PROCEDURE — 97530 THERAPEUTIC ACTIVITIES: CPT | Performed by: PHYSICAL THERAPIST

## 2022-03-14 PROCEDURE — 97110 THERAPEUTIC EXERCISES: CPT | Performed by: PHYSICAL THERAPIST

## 2022-03-14 RX ORDER — SIMVASTATIN 20 MG
TABLET ORAL
Qty: 90 TABLET | Refills: 0 | Status: SHIPPED | OUTPATIENT
Start: 2022-03-14 | End: 2022-06-28

## 2022-03-14 RX ORDER — METOPROLOL SUCCINATE 100 MG/1
TABLET, EXTENDED RELEASE ORAL
Qty: 90 TABLET | Refills: 0 | Status: SHIPPED | OUTPATIENT
Start: 2022-03-14 | End: 2022-06-06

## 2022-03-14 NOTE — PROGRESS NOTES
Daily Note     Today's date: 3/14/2022  Patient name: Lynette Gao  : 1935  MRN: [id number]  Referring provider: Cyndi Cates DO  Dx:   Encounter Diagnosis     ICD-10-CM    1  Ambulatory dysfunction  R26 2    2  Balance problem  R26 89                   Subjective: Pt reports no falls and that her balance feels better  States her R knee has been swelling and she needs to call her doctor  Objective: See treatment diary below  Assessment: Pt demonstrate improvements in ambulation with head turns with improved maintenance of gait speed; decreased scissoring gait also noted  Improved SLS with decreased use of UE support to reset LOB  Decreased tolerance to sit to stands this session secondary to increased R knee pain therefore only 6 performed due to safety  Plan: Continue per plan of care  Precautions: HTN    Manuals 3/10 3/14   2/14 2/17 2/21 2/24 2/28 3/3 3/7                                                           Neuro Re-Ed              Tandem stance 30"x2  ea 30"x2  ea   30"x2 2x30" 3x30" ea  3x30" ea  3x30" ea  1x30" ea  2x30" ea foam   NBOS   foam      NP         NBOS EC Foam  30"x4 Foam  30"x4   30"x3 3x30"  4x30" 4x30" 4x30" foam  4x30" foam   Side stepping at rail     4 laps x5 on foam No UE support x3 on foam no UE support x5 on foam no UE support      SLS 30"x2  ea 30"x2  ea   30"x2  ea 3x30" ea  3x30" ea  3x30" ea  3x30" ea  3x30" ea  Walking w/ head turns Horz/  vert  2 laps ea around gym Horz/  vert  2 laps ea around gym   50 ft  x4  ea 2 laps in gym  horizontal and vertical 2 laps in gym  horizontal and vertical 2 laps in gym horizontal and vertical 2 laps in gym horizontal and vertical  2 laps in gym horizontal and vertical   Rockerboard F/B M/L 1 min  ea 1' ea  1 min  ea 1' ea  1' ea  1' ea  1' ea     1' ea   Re-eval           AG                  Ther Ex              Bike  5 min  timer 5'       5'  5'   Standing hip abd MH  25#  3x10 bilat MH  25#  3x10 ea  MH  10#  2x10  bilat MH 10# 3x10 ea  MH 10# 3x10 ea  MH 10# 3x10 ea  MH 25# 3x10 ea  MH 25# 3x10 ea  MH 25# 3x10 ea  Leg press -  St 7 65#  3x10 65#  3x10   45#  3x10 45#  3x10 45#  3x10 45# 3x10 NV 45# 3x10 45# 3x10   Standing marching MH  25#  3x10 bilat MH  25#  3x10 ea  MH  10#  2x10  bilat MH 10# 3x10 ea  MH 10# 3x10 ea  MH 10# 3x10 ea  MH 25# 3x10 ea  MH 25# 3x10 ea  MH 25# 3x10 ea  Pt education              Ther Activity              Sit to stands Foam under feet low mat  3x10 Foam   x6 from chair   2x7 from low mat 1x10 low mat  2x10 foam under feet from low mat 3x10 foam under feet from low mat 3x10 foam under feet from low mat 3x10 foam under feet from low mat 3x10 foam under feet from low mat 3x10 foam under feet from low mat   Mini squats Foam  unilat HHA  3x10 Foam  unilat HHA  3x10   2x12 3x10 foam  3x10 foam   Unilateral UE support 3x10 foam Unilateral UE support 3x10 foam Unilateral UE support  3x10 on foam unilateral UE support   Step ups  6"  2x10 6" 3x10 ea  6" 2x10 ea  6" 2x10 ea                   Gait Training              SPC fitting                            Modalities

## 2022-03-17 ENCOUNTER — OFFICE VISIT (OUTPATIENT)
Dept: PHYSICAL THERAPY | Facility: CLINIC | Age: 87
End: 2022-03-17
Payer: MEDICARE

## 2022-03-17 DIAGNOSIS — R26.2 AMBULATORY DYSFUNCTION: Primary | ICD-10-CM

## 2022-03-17 DIAGNOSIS — R26.89 BALANCE PROBLEM: ICD-10-CM

## 2022-03-17 PROCEDURE — 97112 NEUROMUSCULAR REEDUCATION: CPT | Performed by: PHYSICAL THERAPIST

## 2022-03-17 PROCEDURE — 97110 THERAPEUTIC EXERCISES: CPT | Performed by: PHYSICAL THERAPIST
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
## 2022-03-31 ENCOUNTER — APPOINTMENT (OUTPATIENT)
Dept: LAB | Facility: CLINIC | Age: 87
End: 2022-03-31
Payer: MEDICARE

## 2022-03-31 DIAGNOSIS — E78.2 MIXED HYPERLIPIDEMIA: ICD-10-CM

## 2022-03-31 DIAGNOSIS — I10 ESSENTIAL HYPERTENSION: ICD-10-CM

## 2022-03-31 DIAGNOSIS — M81.0 AGE-RELATED OSTEOPOROSIS WITHOUT CURRENT PATHOLOGICAL FRACTURE: ICD-10-CM

## 2022-03-31 LAB
ALBUMIN SERPL BCP-MCNC: 3.5 G/DL (ref 3.5–5)
ALP SERPL-CCNC: 46 U/L (ref 46–116)
ALT SERPL W P-5'-P-CCNC: 19 U/L (ref 12–78)
ANION GAP SERPL CALCULATED.3IONS-SCNC: 4 MMOL/L (ref 4–13)
AST SERPL W P-5'-P-CCNC: 16 U/L (ref 5–45)
BASOPHILS # BLD AUTO: 0.05 THOUSANDS/ΜL (ref 0–0.1)
BASOPHILS NFR BLD AUTO: 1 % (ref 0–1)
BILIRUB SERPL-MCNC: 0.63 MG/DL (ref 0.2–1)
BUN SERPL-MCNC: 22 MG/DL (ref 5–25)
CALCIUM SERPL-MCNC: 9.6 MG/DL (ref 8.3–10.1)
CHLORIDE SERPL-SCNC: 109 MMOL/L (ref 100–108)
CHOLEST SERPL-MCNC: 154 MG/DL
CO2 SERPL-SCNC: 30 MMOL/L (ref 21–32)
CREAT SERPL-MCNC: 0.79 MG/DL (ref 0.6–1.3)
EOSINOPHIL # BLD AUTO: 0.33 THOUSAND/ΜL (ref 0–0.61)
EOSINOPHIL NFR BLD AUTO: 6 % (ref 0–6)
ERYTHROCYTE [DISTWIDTH] IN BLOOD BY AUTOMATED COUNT: 14.3 % (ref 11.6–15.1)
GFR SERPL CREATININE-BSD FRML MDRD: 68 ML/MIN/1.73SQ M
GLUCOSE P FAST SERPL-MCNC: 108 MG/DL (ref 65–99)
HCT VFR BLD AUTO: 44.3 % (ref 34.8–46.1)
HDLC SERPL-MCNC: 60 MG/DL
HGB BLD-MCNC: 14.1 G/DL (ref 11.5–15.4)
IMM GRANULOCYTES # BLD AUTO: 0.02 THOUSAND/UL (ref 0–0.2)
IMM GRANULOCYTES NFR BLD AUTO: 0 % (ref 0–2)
LDLC SERPL CALC-MCNC: 75 MG/DL (ref 0–100)
LYMPHOCYTES # BLD AUTO: 1.64 THOUSANDS/ΜL (ref 0.6–4.47)
LYMPHOCYTES NFR BLD AUTO: 29 % (ref 14–44)
MCH RBC QN AUTO: 29.6 PG (ref 26.8–34.3)
MCHC RBC AUTO-ENTMCNC: 31.8 G/DL (ref 31.4–37.4)
MCV RBC AUTO: 93 FL (ref 82–98)
MONOCYTES # BLD AUTO: 0.52 THOUSAND/ΜL (ref 0.17–1.22)
MONOCYTES NFR BLD AUTO: 9 % (ref 4–12)
NEUTROPHILS # BLD AUTO: 3.06 THOUSANDS/ΜL (ref 1.85–7.62)
NEUTS SEG NFR BLD AUTO: 55 % (ref 43–75)
NRBC BLD AUTO-RTO: 0 /100 WBCS
PLATELET # BLD AUTO: 211 THOUSANDS/UL (ref 149–390)
PMV BLD AUTO: 11.6 FL (ref 8.9–12.7)
POTASSIUM SERPL-SCNC: 4.5 MMOL/L (ref 3.5–5.3)
PROT SERPL-MCNC: 6.5 G/DL (ref 6.4–8.2)
RBC # BLD AUTO: 4.77 MILLION/UL (ref 3.81–5.12)
SODIUM SERPL-SCNC: 143 MMOL/L (ref 136–145)
TRIGL SERPL-MCNC: 93 MG/DL
TSH SERPL DL<=0.05 MIU/L-ACNC: 2.47 UIU/ML (ref 0.36–3.74)
WBC # BLD AUTO: 5.62 THOUSAND/UL (ref 4.31–10.16)

## 2022-03-31 PROCEDURE — 85025 COMPLETE CBC W/AUTO DIFF WBC: CPT

## 2022-03-31 PROCEDURE — 80061 LIPID PANEL: CPT

## 2022-03-31 PROCEDURE — 80053 COMPREHEN METABOLIC PANEL: CPT

## 2022-03-31 PROCEDURE — 36415 COLL VENOUS BLD VENIPUNCTURE: CPT

## 2022-03-31 PROCEDURE — 84443 ASSAY THYROID STIM HORMONE: CPT

## 2022-04-06 ENCOUNTER — OFFICE VISIT (OUTPATIENT)
Dept: FAMILY MEDICINE CLINIC | Facility: CLINIC | Age: 87
End: 2022-04-06
Payer: MEDICARE

## 2022-04-06 VITALS
BODY MASS INDEX: 26.13 KG/M2 | WEIGHT: 142 LBS | RESPIRATION RATE: 16 BRPM | DIASTOLIC BLOOD PRESSURE: 72 MMHG | HEART RATE: 74 BPM | OXYGEN SATURATION: 96 % | SYSTOLIC BLOOD PRESSURE: 138 MMHG | TEMPERATURE: 98.4 F | HEIGHT: 62 IN

## 2022-04-06 DIAGNOSIS — R73.01 IMPAIRED FASTING GLUCOSE: Primary | ICD-10-CM

## 2022-04-06 DIAGNOSIS — Z00.00 MEDICARE ANNUAL WELLNESS VISIT, SUBSEQUENT: ICD-10-CM

## 2022-04-06 DIAGNOSIS — I10 ESSENTIAL HYPERTENSION: ICD-10-CM

## 2022-04-06 DIAGNOSIS — E78.2 MIXED HYPERLIPIDEMIA: ICD-10-CM

## 2022-04-06 DIAGNOSIS — E03.9 ACQUIRED HYPOTHYROIDISM: ICD-10-CM

## 2022-04-06 PROCEDURE — 1123F ACP DISCUSS/DSCN MKR DOCD: CPT | Performed by: FAMILY MEDICINE

## 2022-04-06 PROCEDURE — G0439 PPPS, SUBSEQ VISIT: HCPCS | Performed by: FAMILY MEDICINE

## 2022-04-06 PROCEDURE — 99214 OFFICE O/P EST MOD 30 MIN: CPT | Performed by: FAMILY MEDICINE

## 2022-04-06 NOTE — PATIENT INSTRUCTIONS
Medicare Preventive Visit Patient Instructions  Thank you for completing your Welcome to Medicare Visit or Medicare Annual Wellness Visit today  Your next wellness visit will be due in one year (4/7/2023)  The screening/preventive services that you may require over the next 5-10 years are detailed below  Some tests may not apply to you based off risk factors and/or age  Screening tests ordered at today's visit but not completed yet may show as past due  Also, please note that scanned in results may not display below  Preventive Screenings:  Service Recommendations Previous Testing/Comments   Colorectal Cancer Screening  * Colonoscopy    * Fecal Occult Blood Test (FOBT)/Fecal Immunochemical Test (FIT)  * Fecal DNA/Cologuard Test  * Flexible Sigmoidoscopy Age: 54-65 years old   Colonoscopy: every 10 years (may be performed more frequently if at higher risk)  OR  FOBT/FIT: every 1 year  OR  Cologuard: every 3 years  OR  Sigmoidoscopy: every 5 years  Screening may be recommended earlier than age 48 if at higher risk for colorectal cancer  Also, an individualized decision between you and your healthcare provider will decide whether screening between the ages of 74-80 would be appropriate  Colonoscopy: Not on file  FOBT/FIT: Not on file  Cologuard: Not on file  Sigmoidoscopy: Not on file          Breast Cancer Screening Age: 36 years old  Frequency: every 1-2 years  Not required if history of left and right mastectomy Mammogram: Not on file        Cervical Cancer Screening Between the ages of 21-29, pap smear recommended once every 3 years  Between the ages of 33-67, can perform pap smear with HPV co-testing every 5 years     Recommendations may differ for women with a history of total hysterectomy, cervical cancer, or abnormal pap smears in past  Pap Smear: Not on file        Hepatitis C Screening Once for adults born between [facility]  More frequently in patients at high risk for Hepatitis C Hep C Antibody: Not on file        Diabetes Screening 1-2 times per year if you're at risk for diabetes or have pre-diabetes Fasting glucose: 108 mg/dL   A1C: 5 6 %        Cholesterol Screening Once every 5 years if you don't have a lipid disorder  May order more often based on risk factors  Lipid panel: 03/31/2022          Other Preventive Screenings Covered by Medicare:  1  Abdominal Aortic Aneurysm (AAA) Screening: covered once if your at risk  You're considered to be at risk if you have a family history of AAA  2  Lung Cancer Screening: covers low dose CT scan once per year if you meet all of the following conditions: (1) Age 50-69; (2) No signs or symptoms of lung cancer; (3) Current smoker or have quit smoking within the last 15 years; (4) You have a tobacco smoking history of at least 30 pack years (packs per day multiplied by number of years you smoked); (5) You get a written order from a healthcare provider  3  Glaucoma Screening: covered annually if you're considered high risk: (1) You have diabetes OR (2) Family history of glaucoma OR (3)  aged 48 and older OR (3)  American aged 72 and older  3  Osteoporosis Screening: covered every 2 years if you meet one of the following conditions: (1) You're estrogen deficient and at risk for osteoporosis based off medical history and other findings; (2) Have a vertebral abnormality; (3) On glucocorticoid therapy for more than 3 months; (4) Have primary hyperparathyroidism; (5) On osteoporosis medications and need to assess response to drug therapy  · Last bone density test (DXA Scan): Not on file  5  HIV Screening: covered annually if you're between the age of 12-76  Also covered annually if you are younger than 13 and older than 72 with risk factors for HIV infection  For pregnant patients, it is covered up to 3 times per pregnancy      Immunizations:  Immunization Recommendations   Influenza Vaccine Annual influenza vaccination during flu season is recommended for all persons aged >= 6 months who do not have contraindications   Pneumococcal Vaccine (Prevnar and Pneumovax)  * Prevnar = PCV13  * Pneumovax = PPSV23   Adults 25-60 years old: 1-3 doses may be recommended based on certain risk factors  Adults 72 years old: Prevnar (PCV13) vaccine recommended followed by Pneumovax (PPSV23) vaccine  If already received PPSV23 since turning 65, then PCV13 recommended at least one year after PPSV23 dose  Hepatitis B Vaccine 3 dose series if at intermediate or high risk (ex: diabetes, end stage renal disease, liver disease)   Tetanus (Td) Vaccine - COST NOT COVERED BY MEDICARE PART B Following completion of primary series, a booster dose should be given every 10 years to maintain immunity against tetanus  Td may also be given as tetanus wound prophylaxis  Tdap Vaccine - COST NOT COVERED BY MEDICARE PART B Recommended at least once for all adults  For pregnant patients, recommended with each pregnancy  Shingles Vaccine (Shingrix) - COST NOT COVERED BY MEDICARE PART B  2 shot series recommended in those aged 48 and above     Health Maintenance Due:      Topic Date Due    DXA SCAN  Never done    Breast Cancer Screening: Mammogram  Never done     Immunizations Due:  There are no preventive care reminders to display for this patient  Advance Directives   What are advance directives? Advance directives are legal documents that state your wishes and plans for medical care  These plans are made ahead of time in case you lose your ability to make decisions for yourself  Advance directives can apply to any medical decision, such as the treatments you want, and if you want to donate organs  What are the types of advance directives? There are many types of advance directives, and each state has rules about how to use them  You may choose a combination of any of the following:  · Living will: This is a written record of the treatment you want   You can also choose which treatments you do not want, which to limit, and which to stop at a certain time  This includes surgery, medicine, IV fluid, and tube feedings  · Durable power of  for healthcare [facility]): This is a written record that states who you want to make healthcare choices for you when you are unable to make them for yourself  This person, called a proxy, is usually a family member or a friend  You may choose more than 1 proxy  · Do not resuscitate (DNR) order:  A DNR order is used in case your heart stops beating or you stop breathing  It is a request not to have certain forms of treatment, such as CPR  A DNR order may be included in other types of advance directives  · Medical directive: This covers the care that you want if you are in a coma, near death, or unable to make decisions for yourself  You can list the treatments you want for each condition  Treatment may include pain medicine, surgery, blood transfusions, dialysis, IV or tube feedings, and a ventilator (breathing machine)  · Values history: This document has questions about your views, beliefs, and how you feel and think about life  This information can help others choose the care that you would choose  Why are advance directives important? An advance directive helps you control your care  Although spoken wishes may be used, it is better to have your wishes written down  Spoken wishes can be misunderstood, or not followed  Treatments may be given even if you do not want them  An advance directive may make it easier for your family to make difficult choices about your care  Urinary Incontinence   Urinary incontinence (UI)  is when you lose control of your bladder  UI develops because your bladder cannot store or empty urine properly  The 3 most common types of UI are stress incontinence, urge incontinence, or both  Medicines:   · May be given to help strengthen your bladder control   Report any side effects of medication to your healthcare provider  Do pelvic muscle exercises often:  Your pelvic muscles help you stop urinating  Squeeze these muscles tight for 5 seconds, then relax for 5 seconds  Gradually work up to squeezing for 10 seconds  Do 3 sets of 15 repetitions a day, or as directed  This will help strengthen your pelvic muscles and improve bladder control  Train your bladder:  Go to the bathroom at set times, such as every 2 hours, even if you do not feel the urge to go  You can also try to hold your urine when you feel the urge to go  For example, hold your urine for 5 minutes when you feel the urge to go  As that becomes easier, hold your urine for 10 minutes  Self-care:   · Keep a UI record  Write down how often you leak urine and how much you leak  Make a note of what you were doing when you leaked urine  · Drink liquids as directed  You may need to limit the amount of liquid you drink to help control your urine leakage  Do not drink any liquid right before you go to bed  Limit or do not have drinks that contain caffeine or alcohol  · Prevent constipation  Eat a variety of high-fiber foods  Good examples are high-fiber cereals, beans, vegetables, and whole-grain breads  Walking is the best way to trigger your intestines to have a bowel movement  · Exercise regularly and maintain a healthy weight  Weight loss and exercise will decrease pressure on your bladder and help you control your leakage  · Use a catheter as directed  to help empty your bladder  A catheter is a tiny, plastic tube that is put into your bladder to drain your urine  · Go to behavior therapy as directed  Behavior therapy may be used to help you learn to control your urge to urinate  Weight Management   Why it is important to manage your weight:  Being overweight increases your risk of health conditions such as heart disease, high blood pressure, type 2 diabetes, and certain types of cancer   It can also increase your risk for osteoarthritis, sleep apnea, and other respiratory problems  Aim for a slow, steady weight loss  Even a small amount of weight loss can lower your risk of health problems  How to lose weight safely:  A safe and healthy way to lose weight is to eat fewer calories and get regular exercise  You can lose up about 1 pound a week by decreasing the number of calories you eat by 500 calories each day  Healthy meal plan for weight management:  A healthy meal plan includes a variety of foods, contains fewer calories, and helps you stay healthy  A healthy meal plan includes the following:  · Eat whole-grain foods more often  A healthy meal plan should contain fiber  Fiber is the part of grains, fruits, and vegetables that is not broken down by your body  Whole-grain foods are healthy and provide extra fiber in your diet  Some examples of whole-grain foods are whole-wheat breads and pastas, oatmeal, brown rice, and bulgur  · Eat a variety of vegetables every day  Include dark, leafy greens such as spinach, kale, nellie greens, and mustard greens  Eat yellow and orange vegetables such as carrots, sweet potatoes, and winter squash  · Eat a variety of fruits every day  Choose fresh or canned fruit (canned in its own juice or light syrup) instead of juice  Fruit juice has very little or no fiber  · Eat low-fat dairy foods  Drink fat-free (skim) milk or 1% milk  Eat fat-free yogurt and low-fat cottage cheese  Try low-fat cheeses such as mozzarella and other reduced-fat cheeses  · Choose meat and other protein foods that are low in fat  Choose beans or other legumes such as split peas or lentils  Choose fish, skinless poultry (chicken or turkey), or lean cuts of red meat (beef or pork)  Before you cook meat or poultry, cut off any visible fat  · Use less fat and oil  Try baking foods instead of frying them  Add less fat, such as margarine, sour cream, regular salad dressing and mayonnaise to foods  Eat fewer high-fat foods   Some examples of high-fat foods include french fries, doughnuts, ice cream, and cakes  · Eat fewer sweets  Limit foods and drinks that are high in sugar  This includes candy, cookies, regular soda, and sweetened drinks  Exercise:  Exercise at least 30 minutes per day on most days of the week  Some examples of exercise include walking, biking, dancing, and swimming  You can also fit in more physical activity by taking the stairs instead of the elevator or parking farther away from stores  Ask your healthcare provider about the best exercise plan for you  © Copyright [employer] 2018 Information is for End User's use only and may not be sold, redistributed or otherwise used for commercial purposes  All illustrations and images included in CareNotes® are the copyrighted property of [employer]  or [facility] Preventive Visit Patient Instructions  Thank you for completing your Welcome to Medicare Visit or Medicare Annual Wellness Visit today  Your next wellness visit will be due in one year (4/7/2023)  The screening/preventive services that you may require over the next 5-10 years are detailed below  Some tests may not apply to you based off risk factors and/or age  Screening tests ordered at today's visit but not completed yet may show as past due  Also, please note that scanned in results may not display below  Preventive Screenings:  Service Recommendations Previous Testing/Comments   Colorectal Cancer Screening  * Colonoscopy    * Fecal Occult Blood Test (FOBT)/Fecal Immunochemical Test (FIT)  * Fecal DNA/Cologuard Test  * Flexible Sigmoidoscopy Age: 54-65 years old   Colonoscopy: every 10 years (may be performed more frequently if at higher risk)  OR  FOBT/FIT: every 1 year  OR  Cologuard: every 3 years  OR  Sigmoidoscopy: every 5 years  Screening may be recommended earlier than age 48 if at higher risk for colorectal cancer   Also, an individualized decision between you and your healthcare provider will decide whether screening between the ages of 74-80 would be appropriate  Colonoscopy: Not on file  FOBT/FIT: Not on file  Cologuard: Not on file  Sigmoidoscopy: Not on file          Breast Cancer Screening Age: 36 years old  Frequency: every 1-2 years  Not required if history of left and right mastectomy Mammogram: Not on file        Cervical Cancer Screening Between the ages of 21-29, pap smear recommended once every 3 years  Between the ages of 33-67, can perform pap smear with HPV co-testing every 5 years  Recommendations may differ for women with a history of total hysterectomy, cervical cancer, or abnormal pap smears in past  Pap Smear: Not on file        Hepatitis C Screening Once for adults born between 1945 and 1965  More frequently in patients at high risk for Hepatitis C Hep C Antibody: Not on file        Diabetes Screening 1-2 times per year if you're at risk for diabetes or have pre-diabetes Fasting glucose: 108 mg/dL   A1C: 5 6 %        Cholesterol Screening Once every 5 years if you don't have a lipid disorder  May order more often based on risk factors  Lipid panel: 03/31/2022          Other Preventive Screenings Covered by Medicare:  6  Abdominal Aortic Aneurysm (AAA) Screening: covered once if your at risk  You're considered to be at risk if you have a family history of AAA  7  Lung Cancer Screening: covers low dose CT scan once per year if you meet all of the following conditions: (1) Age 50-69; (2) No signs or symptoms of lung cancer; (3) Current smoker or have quit smoking within the last 15 years; (4) You have a tobacco smoking history of at least 30 pack years (packs per day multiplied by number of years you smoked); (5) You get a written order from a healthcare provider    8  Glaucoma Screening: covered annually if you're considered high risk: (1) You have diabetes OR (2) Family history of glaucoma OR (3)  aged 48 and older OR (3)  American aged 72 and older  9  Osteoporosis Screening: covered every 2 years if you meet one of the following conditions: (1) You're estrogen deficient and at risk for osteoporosis based off medical history and other findings; (2) Have a vertebral abnormality; (3) On glucocorticoid therapy for more than 3 months; (4) Have primary hyperparathyroidism; (5) On osteoporosis medications and need to assess response to drug therapy  · Last bone density test (DXA Scan): Not on file  10  HIV Screening: covered annually if you're between the age of 12-76  Also covered annually if you are younger than 13 and older than 72 with risk factors for HIV infection  For pregnant patients, it is covered up to 3 times per pregnancy  Immunizations:  Immunization Recommendations   Influenza Vaccine Annual influenza vaccination during flu season is recommended for all persons aged >= 6 months who do not have contraindications   Pneumococcal Vaccine (Prevnar and Pneumovax)  * Prevnar = PCV13  * Pneumovax = PPSV23   Adults 25-60 years old: 1-3 doses may be recommended based on certain risk factors  Adults 72 years old: Prevnar (PCV13) vaccine recommended followed by Pneumovax (PPSV23) vaccine  If already received PPSV23 since turning 65, then PCV13 recommended at least one year after PPSV23 dose  Hepatitis B Vaccine 3 dose series if at intermediate or high risk (ex: diabetes, end stage renal disease, liver disease)   Tetanus (Td) Vaccine - COST NOT COVERED BY MEDICARE PART B Following completion of primary series, a booster dose should be given every 10 years to maintain immunity against tetanus  Td may also be given as tetanus wound prophylaxis  Tdap Vaccine - COST NOT COVERED BY MEDICARE PART B Recommended at least once for all adults  For pregnant patients, recommended with each pregnancy     Shingles Vaccine (Shingrix) - COST NOT COVERED BY MEDICARE PART B  2 shot series recommended in those aged 48 and above     Health Maintenance Due: Topic Date Due    DXA SCAN  Never done    Breast Cancer Screening: Mammogram  Never done     Immunizations Due:  There are no preventive care reminders to display for this patient  Advance Directives   What are advance directives? Advance directives are legal documents that state your wishes and plans for medical care  These plans are made ahead of time in case you lose your ability to make decisions for yourself  Advance directives can apply to any medical decision, such as the treatments you want, and if you want to donate organs  What are the types of advance directives? There are many types of advance directives, and each state has rules about how to use them  You may choose a combination of any of the following:  · Living will: This is a written record of the treatment you want  You can also choose which treatments you do not want, which to limit, and which to stop at a certain time  This includes surgery, medicine, IV fluid, and tube feedings  · Durable power of  for healthcare [facility]): This is a written record that states who you want to make healthcare choices for you when you are unable to make them for yourself  This person, called a proxy, is usually a family member or a friend  You may choose more than 1 proxy  · Do not resuscitate (DNR) order:  A DNR order is used in case your heart stops beating or you stop breathing  It is a request not to have certain forms of treatment, such as CPR  A DNR order may be included in other types of advance directives  · Medical directive: This covers the care that you want if you are in a coma, near death, or unable to make decisions for yourself  You can list the treatments you want for each condition  Treatment may include pain medicine, surgery, blood transfusions, dialysis, IV or tube feedings, and a ventilator (breathing machine)  · Values history:   This document has questions about your views, beliefs, and how you feel and think about life  This information can help others choose the care that you would choose  Why are advance directives important? An advance directive helps you control your care  Although spoken wishes may be used, it is better to have your wishes written down  Spoken wishes can be misunderstood, or not followed  Treatments may be given even if you do not want them  An advance directive may make it easier for your family to make difficult choices about your care  Urinary Incontinence   Urinary incontinence (UI)  is when you lose control of your bladder  UI develops because your bladder cannot store or empty urine properly  The 3 most common types of UI are stress incontinence, urge incontinence, or both  Medicines:   · May be given to help strengthen your bladder control  Report any side effects of medication to your healthcare provider  Do pelvic muscle exercises often:  Your pelvic muscles help you stop urinating  Squeeze these muscles tight for 5 seconds, then relax for 5 seconds  Gradually work up to squeezing for 10 seconds  Do 3 sets of 15 repetitions a day, or as directed  This will help strengthen your pelvic muscles and improve bladder control  Train your bladder:  Go to the bathroom at set times, such as every 2 hours, even if you do not feel the urge to go  You can also try to hold your urine when you feel the urge to go  For example, hold your urine for 5 minutes when you feel the urge to go  As that becomes easier, hold your urine for 10 minutes  Self-care:   · Keep a UI record  Write down how often you leak urine and how much you leak  Make a note of what you were doing when you leaked urine  · Drink liquids as directed  You may need to limit the amount of liquid you drink to help control your urine leakage  Do not drink any liquid right before you go to bed  Limit or do not have drinks that contain caffeine or alcohol  · Prevent constipation  Eat a variety of high-fiber foods   Good examples are high-fiber cereals, beans, vegetables, and whole-grain breads  Walking is the best way to trigger your intestines to have a bowel movement  · Exercise regularly and maintain a healthy weight  Weight loss and exercise will decrease pressure on your bladder and help you control your leakage  · Use a catheter as directed  to help empty your bladder  A catheter is a tiny, plastic tube that is put into your bladder to drain your urine  · Go to behavior therapy as directed  Behavior therapy may be used to help you learn to control your urge to urinate  Weight Management   Why it is important to manage your weight:  Being overweight increases your risk of health conditions such as heart disease, high blood pressure, type 2 diabetes, and certain types of cancer  It can also increase your risk for osteoarthritis, sleep apnea, and other respiratory problems  Aim for a slow, steady weight loss  Even a small amount of weight loss can lower your risk of health problems  How to lose weight safely:  A safe and healthy way to lose weight is to eat fewer calories and get regular exercise  You can lose up about 1 pound a week by decreasing the number of calories you eat by 500 calories each day  Healthy meal plan for weight management:  A healthy meal plan includes a variety of foods, contains fewer calories, and helps you stay healthy  A healthy meal plan includes the following:  · Eat whole-grain foods more often  A healthy meal plan should contain fiber  Fiber is the part of grains, fruits, and vegetables that is not broken down by your body  Whole-grain foods are healthy and provide extra fiber in your diet  Some examples of whole-grain foods are whole-wheat breads and pastas, oatmeal, brown rice, and bulgur  · Eat a variety of vegetables every day  Include dark, leafy greens such as spinach, kale, nellie greens, and mustard greens   Eat yellow and orange vegetables such as carrots, sweet potatoes, and winter squash  · Eat a variety of fruits every day  Choose fresh or canned fruit (canned in its own juice or light syrup) instead of juice  Fruit juice has very little or no fiber  · Eat low-fat dairy foods  Drink fat-free (skim) milk or 1% milk  Eat fat-free yogurt and low-fat cottage cheese  Try low-fat cheeses such as mozzarella and other reduced-fat cheeses  · Choose meat and other protein foods that are low in fat  Choose beans or other legumes such as split peas or lentils  Choose fish, skinless poultry (chicken or turkey), or lean cuts of red meat (beef or pork)  Before you cook meat or poultry, cut off any visible fat  · Use less fat and oil  Try baking foods instead of frying them  Add less fat, such as margarine, sour cream, regular salad dressing and mayonnaise to foods  Eat fewer high-fat foods  Some examples of high-fat foods include french fries, doughnuts, ice cream, and cakes  · Eat fewer sweets  Limit foods and drinks that are high in sugar  This includes candy, cookies, regular soda, and sweetened drinks  Exercise:  Exercise at least 30 minutes per day on most days of the week  Some examples of exercise include walking, biking, dancing, and swimming  You can also fit in more physical activity by taking the stairs instead of the elevator or parking farther away from stores  Ask your healthcare provider about the best exercise plan for you  © Copyright [employer] 2018 Information is for End User's use only and may not be sold, redistributed or otherwise used for commercial purposes  All illustrations and images included in CareNotes® are the copyrighted property of A D A M , Inc  or [facility] Preventive Visit Patient Instructions  Thank you for completing your Welcome to Medicare Visit or Medicare Annual Wellness Visit today  Your next wellness visit will be due in one year (4/7/2023)    The screening/preventive services that you may require over the next 5-10 years are detailed below  Some tests may not apply to you based off risk factors and/or age  Screening tests ordered at today's visit but not completed yet may show as past due  Also, please note that scanned in results may not display below  Preventive Screenings:  Service Recommendations Previous Testing/Comments   Colorectal Cancer Screening  * Colonoscopy    * Fecal Occult Blood Test (FOBT)/Fecal Immunochemical Test (FIT)  * Fecal DNA/Cologuard Test  * Flexible Sigmoidoscopy Age: 54-65 years old   Colonoscopy: every 10 years (may be performed more frequently if at higher risk)  OR  FOBT/FIT: every 1 year  OR  Cologuard: every 3 years  OR  Sigmoidoscopy: every 5 years  Screening may be recommended earlier than age 48 if at higher risk for colorectal cancer  Also, an individualized decision between you and your healthcare provider will decide whether screening between the ages of 74-80 would be appropriate  Colonoscopy: Not on file  FOBT/FIT: Not on file  Cologuard: Not on file  Sigmoidoscopy: Not on file          Breast Cancer Screening Age: 36 years old  Frequency: every 1-2 years  Not required if history of left and right mastectomy Mammogram: Not on file        Cervical Cancer Screening Between the ages of 21-29, pap smear recommended once every 3 years  Between the ages of 33-67, can perform pap smear with HPV co-testing every 5 years  Recommendations may differ for women with a history of total hysterectomy, cervical cancer, or abnormal pap smears in past  Pap Smear: Not on file    Screening Not Indicated   Hepatitis C Screening Once for adults born between 1945 and 1965  More frequently in patients at high risk for Hepatitis C Hep C Antibody: Not on file        Diabetes Screening 1-2 times per year if you're at risk for diabetes or have pre-diabetes Fasting glucose: 108 mg/dL   A1C: 5 6 %    Screening Current   Cholesterol Screening Once every 5 years if you don't have a lipid disorder   May order more often based on risk factors  Lipid panel: 03/31/2022    Screening Not Indicated  History Lipid Disorder     Other Preventive Screenings Covered by Medicare:  11  Abdominal Aortic Aneurysm (AAA) Screening: covered once if your at risk  You're considered to be at risk if you have a family history of AAA  12  Lung Cancer Screening: covers low dose CT scan once per year if you meet all of the following conditions: (1) Age 50-69; (2) No signs or symptoms of lung cancer; (3) Current smoker or have quit smoking within the last 15 years; (4) You have a tobacco smoking history of at least 30 pack years (packs per day multiplied by number of years you smoked); (5) You get a written order from a healthcare provider  15  Glaucoma Screening: covered annually if you're considered high risk: (1) You have diabetes OR (2) Family history of glaucoma OR (3)  aged 48 and older OR (3)  American aged 72 and older  15  Osteoporosis Screening: covered every 2 years if you meet one of the following conditions: (1) You're estrogen deficient and at risk for osteoporosis based off medical history and other findings; (2) Have a vertebral abnormality; (3) On glucocorticoid therapy for more than 3 months; (4) Have primary hyperparathyroidism; (5) On osteoporosis medications and need to assess response to drug therapy  · Last bone density test (DXA Scan): Not on file  15  HIV Screening: covered annually if you're between the age of 12-76  Also covered annually if you are younger than 13 and older than 72 with risk factors for HIV infection  For pregnant patients, it is covered up to 3 times per pregnancy      Immunizations:  Immunization Recommendations   Influenza Vaccine Annual influenza vaccination during flu season is recommended for all persons aged >= 6 months who do not have contraindications   Pneumococcal Vaccine (Prevnar and Pneumovax)  * Prevnar = PCV13  * Pneumovax = PPSV23   Adults 25-60 years old: 1-3 doses may be recommended based on certain risk factors  Adults 72 years old: Prevnar (PCV13) vaccine recommended followed by Pneumovax (PPSV23) vaccine  If already received PPSV23 since turning 65, then PCV13 recommended at least one year after PPSV23 dose  Hepatitis B Vaccine 3 dose series if at intermediate or high risk (ex: diabetes, end stage renal disease, liver disease)   Tetanus (Td) Vaccine - COST NOT COVERED BY MEDICARE PART B Following completion of primary series, a booster dose should be given every 10 years to maintain immunity against tetanus  Td may also be given as tetanus wound prophylaxis  Tdap Vaccine - COST NOT COVERED BY MEDICARE PART B Recommended at least once for all adults  For pregnant patients, recommended with each pregnancy  Shingles Vaccine (Shingrix) - COST NOT COVERED BY MEDICARE PART B  2 shot series recommended in those aged 48 and above     Health Maintenance Due:      Topic Date Due    DXA SCAN  Never done    Breast Cancer Screening: Mammogram  Never done     Immunizations Due:  There are no preventive care reminders to display for this patient  Advance Directives   What are advance directives? Advance directives are legal documents that state your wishes and plans for medical care  These plans are made ahead of time in case you lose your ability to make decisions for yourself  Advance directives can apply to any medical decision, such as the treatments you want, and if you want to donate organs  What are the types of advance directives? There are many types of advance directives, and each state has rules about how to use them  You may choose a combination of any of the following:  · Living will: This is a written record of the treatment you want  You can also choose which treatments you do not want, which to limit, and which to stop at a certain time  This includes surgery, medicine, IV fluid, and tube feedings  · Durable power of  for healthcare [facility]):   This is a written record that states who you want to make healthcare choices for you when you are unable to make them for yourself  This person, called a proxy, is usually a family member or a friend  You may choose more than 1 proxy  · Do not resuscitate (DNR) order:  A DNR order is used in case your heart stops beating or you stop breathing  It is a request not to have certain forms of treatment, such as CPR  A DNR order may be included in other types of advance directives  · Medical directive: This covers the care that you want if you are in a coma, near death, or unable to make decisions for yourself  You can list the treatments you want for each condition  Treatment may include pain medicine, surgery, blood transfusions, dialysis, IV or tube feedings, and a ventilator (breathing machine)  · Values history: This document has questions about your views, beliefs, and how you feel and think about life  This information can help others choose the care that you would choose  Why are advance directives important? An advance directive helps you control your care  Although spoken wishes may be used, it is better to have your wishes written down  Spoken wishes can be misunderstood, or not followed  Treatments may be given even if you do not want them  An advance directive may make it easier for your family to make difficult choices about your care  Urinary Incontinence   Urinary incontinence (UI)  is when you lose control of your bladder  UI develops because your bladder cannot store or empty urine properly  The 3 most common types of UI are stress incontinence, urge incontinence, or both  Medicines:   · May be given to help strengthen your bladder control  Report any side effects of medication to your healthcare provider  Do pelvic muscle exercises often:  Your pelvic muscles help you stop urinating  Squeeze these muscles tight for 5 seconds, then relax for 5 seconds  Gradually work up to squeezing for 10 seconds   Do 3 sets of 15 repetitions a day, or as directed  This will help strengthen your pelvic muscles and improve bladder control  Train your bladder:  Go to the bathroom at set times, such as every 2 hours, even if you do not feel the urge to go  You can also try to hold your urine when you feel the urge to go  For example, hold your urine for 5 minutes when you feel the urge to go  As that becomes easier, hold your urine for 10 minutes  Self-care:   · Keep a UI record  Write down how often you leak urine and how much you leak  Make a note of what you were doing when you leaked urine  · Drink liquids as directed  You may need to limit the amount of liquid you drink to help control your urine leakage  Do not drink any liquid right before you go to bed  Limit or do not have drinks that contain caffeine or alcohol  · Prevent constipation  Eat a variety of high-fiber foods  Good examples are high-fiber cereals, beans, vegetables, and whole-grain breads  Walking is the best way to trigger your intestines to have a bowel movement  · Exercise regularly and maintain a healthy weight  Weight loss and exercise will decrease pressure on your bladder and help you control your leakage  · Use a catheter as directed  to help empty your bladder  A catheter is a tiny, plastic tube that is put into your bladder to drain your urine  · Go to behavior therapy as directed  Behavior therapy may be used to help you learn to control your urge to urinate  Weight Management   Why it is important to manage your weight:  Being overweight increases your risk of health conditions such as heart disease, high blood pressure, type 2 diabetes, and certain types of cancer  It can also increase your risk for osteoarthritis, sleep apnea, and other respiratory problems  Aim for a slow, steady weight loss  Even a small amount of weight loss can lower your risk of health problems    How to lose weight safely:  A safe and healthy way to lose weight is to eat fewer calories and get regular exercise  You can lose up about 1 pound a week by decreasing the number of calories you eat by 500 calories each day  Healthy meal plan for weight management:  A healthy meal plan includes a variety of foods, contains fewer calories, and helps you stay healthy  A healthy meal plan includes the following:  · Eat whole-grain foods more often  A healthy meal plan should contain fiber  Fiber is the part of grains, fruits, and vegetables that is not broken down by your body  Whole-grain foods are healthy and provide extra fiber in your diet  Some examples of whole-grain foods are whole-wheat breads and pastas, oatmeal, brown rice, and bulgur  · Eat a variety of vegetables every day  Include dark, leafy greens such as spinach, kale, nellie greens, and mustard greens  Eat yellow and orange vegetables such as carrots, sweet potatoes, and winter squash  · Eat a variety of fruits every day  Choose fresh or canned fruit (canned in its own juice or light syrup) instead of juice  Fruit juice has very little or no fiber  · Eat low-fat dairy foods  Drink fat-free (skim) milk or 1% milk  Eat fat-free yogurt and low-fat cottage cheese  Try low-fat cheeses such as mozzarella and other reduced-fat cheeses  · Choose meat and other protein foods that are low in fat  Choose beans or other legumes such as split peas or lentils  Choose fish, skinless poultry (chicken or turkey), or lean cuts of red meat (beef or pork)  Before you cook meat or poultry, cut off any visible fat  · Use less fat and oil  Try baking foods instead of frying them  Add less fat, such as margarine, sour cream, regular salad dressing and mayonnaise to foods  Eat fewer high-fat foods  Some examples of high-fat foods include french fries, doughnuts, ice cream, and cakes  · Eat fewer sweets  Limit foods and drinks that are high in sugar   This includes candy, cookies, regular soda, and sweetened drinks  Exercise:  Exercise at least 30 minutes per day on most days of the week  Some examples of exercise include walking, biking, dancing, and swimming  You can also fit in more physical activity by taking the stairs instead of the elevator or parking farther away from stores  Ask your healthcare provider about the best exercise plan for you  © Copyright [employer] 2018 Information is for End User's use only and may not be sold, redistributed or otherwise used for commercial purposes   All illustrations and images included in CareNotes® are the copyrighted property of A D A M , Inc  or [address] [place] [address] [employer]

## 2022-04-06 NOTE — PROGRESS NOTES
Assessment and Plan:     Problem List Items Addressed This Visit        Endocrine    Acquired hypothyroidism     TSH stable  Impaired fasting glucose - Primary     Blood sugar 108  Cardiovascular and Mediastinum    Essential hypertension       Other    Mixed hyperlipidemia     Chol 154 and LDL 75  Other Visit Diagnoses     Medicare annual wellness visit, subsequent            BMI Counseling: Body mass index is 26 4 kg/m²  The BMI is above normal  Nutrition recommendations include encouraging healthy choices of fruits and vegetables, moderation in carbohydrate intake, increasing intake of lean protein and reducing intake of saturated and trans fat  Exercise recommendations include moderate physical activity 150 minutes/week  No pharmacotherapy was ordered  Rationale for BMI follow-up plan is due to patient being overweight or obese  Preventive health issues were discussed with patient, and age appropriate screening tests were ordered as noted in patient's After Visit Summary  Personalized health advice and appropriate referrals for health education or preventive services given if needed, as noted in patient's After Visit Summary  History of Present Illness:     Patient presents for Annual Medicare visit       Patient Care Team:  Beata Rojas DO as PCP - General (Family Medicine)     Review of Systems:        Problem List:     Patient Active Problem List   Diagnosis    Essential hypertension    Mixed hyperlipidemia    Urge incontinence    Spinal stenosis    Shoulder pain    Secondary osteoarthritis of shoulder, left    Peripheral vascular disease ([address] )    Acquired hypothyroidism    Centrilobular emphysema (HCC)    Allergic rhinitis    Esophageal reflux    Enthesopathy of hip region    Diastolic dysfunction    Degeneration of intervertebral disc of lumbosacral region    Cough    Chondromalacia patellae    Fatigue    Adrenal adenoma    Essential tremor    Fibromyalgia    Fibromyositis    Hearing loss    Impaired fasting glucose    Localized, primary osteoarthritis    Localized, secondary osteoarthritis of the shoulder region    Lumbar spondylosis    Lumbosacral radiculitis    Melanoma of skin (HCC)    Osteoporosis    Sleep disturbance    Depression, recurrent ([facility]    Ambulatory dysfunction    History of pulmonary embolus (PE)    Leg laceration    Tibial plateau fracture, right    Cerebrovascular disease      Past Medical and Surgical History:     Past Medical History:   Diagnosis Date    Depression     Disease of thyroid gland     Hypertension     Pulmonary embolism ([facility] ) 8/7/2017    Last Assessment & Plan:  Due to persistent clot/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 patient should be on indefinite anticoagulation until the  risk outweighs the benefit  Message sent to Dr Tanika Orourke office regarding the same       Past Surgical History:   Procedure Laterality Date    HYSTERECTOMY      TOTAL SHOULDER ARTHROPLASTY Left       Family History:     Family History   Problem Relation Age of Onset    Heart attack Mother     Cerebral aneurysm Father     Diabetes Sister       Social History:     Social History     Socioeconomic History    Marital status: /Civil Union     Spouse name: None    Number of children: None    Years of education: None    Highest education level: None   Occupational History    None   Tobacco Use    Smoking status: Former Smoker    Smokeless tobacco: Never Used   Vaping Use    Vaping Use: Never used   Substance and Sexual Activity    Alcohol use: Not Currently    Drug use: Never    Sexual activity: None   Other Topics Concern    None   Social History Narrative    None     Social Determinants of Health     Financial Resource Strain: Not on file   Food Insecurity: Not on file   Transportation Needs: Not on file   Physical Activity: Not on file   Stress: Not on file   Social Connections: Not on file   Intimate Partner Violence: Not on file   Housing Stability: Not on file      Medications and Allergies:     Current Outpatient Medications   Medication Sig Dispense Refill    acetaminophen (TYLENOL) 500 mg tablet Take 1,000 mg by mouth every 8 (eight) hours      aspirin (ECOTRIN LOW STRENGTH) 81 mg EC tablet Take 81 mg by mouth daily      Cholecalciferol (VITAMIN D3) 5000 units CAPS Take by mouth      Cyanocobalamin (B-12) 5000 MCG CAPS Take by mouth      Eliquis 2 5 MG Take 1 tablet by mouth twice daily 180 tablet 0    Euthyrox 25 MCG tablet Take 1 tablet by mouth once daily 90 tablet 1    losartan (COZAAR) 100 MG tablet Take 1 tablet (100 mg total) by mouth daily 90 tablet 0    metoprolol succinate (TOPROL-XL) 100 mg 24 hr tablet Take 1 tablet by mouth once daily 90 tablet 0    Polyethyl Glycol-Propyl Glycol (SYSTANE OP) Apply to eye 2 (two) times a day        senna-docusate sodium (SENOKOT S) 8 6-50 mg per tablet Take 1 tablet by mouth 2 (two) times a day        simvastatin (ZOCOR) 20 mg tablet TAKE 1 TABLET BY MOUTH ONCE DAILY AT BEDTIME 90 tablet 0    zoledronic acid (RECLAST) 5 mg/100 mL IV infusion (premix) Infuse 5 mg into a venous catheter once        Spiriva Respimat 2 5 MCG/ACT AERS inhaler INHALE 2 SPRAY(S) BY MOUTH ONCE DAILY 8 g 0     No current facility-administered medications for this visit       No Known Allergies   Immunizations:     Immunization History   Administered Date(s) Administered    COVID-19 MODERNA VACC 0 5 ML IM 01/25/2021, 03/01/2021, 12/03/2021    INFLUENZA 10/29/2009, 09/12/2011, 10/25/2012, 10/10/2013, 11/08/2014, 09/27/2016, 10/12/2017, 10/30/2018, 10/12/2020, 11/03/2021    Influenza Split High Dose Preservative Free IM 09/27/2016    Influenza, Seasonal Vaccine, Quadrivalent, Adjuvanted,  5e 11/03/2021    Influenza, seasonal, injectable, preservative free 10/01/2019    Pneumococcal Conjugate 13-Valent 11/17/2015    Pneumococcal Polysaccharide PPV23 11/24/2009    Tdap 08/09/2012, 02/18/2017, 11/03/2021      Health Maintenance:         Topic Date Due    Breast Cancer Screening: Mammogram  Never done    DXA SCAN  07/15/2023     There are no preventive care reminders to display for this patient  Medicare Screening Tests and Risk Assessments:     Yadira Avila is here for her Subsequent Wellness visit  Last Medicare Wellness visit information reviewed, patient interviewed and updates made to the record today  Health Risk Assessment:   Patient rates overall health as fair  Patient feels that their physical health rating is same  Patient is satisfied with their life  Eyesight was rated as same  Hearing was rated as same  Patient feels that their emotional and mental health rating is slightly worse  Patients states they are sometimes angry  Patient states they are often unusually tired/fatigued  Pain experienced in the last 7 days has been some  Patient's pain rating has been 5/10  Patient states that she has experienced no weight loss or gain in last 6 months  She is debating whether she should be tested for a hearing aide  Depression Screening:   PHQ-9 Score: 1      Fall Risk Screening: In the past year, patient has experienced: no history of falling in past year      Urinary Incontinence Screening:   Patient has not leaked urine accidently in the last six months  Wears depends at night and a pad during the day  Home Safety:  Patient does not have trouble with stairs inside or outside of their home  Patient has working smoke alarms and has working carbon monoxide detector  Home safety hazards include: none  Nutrition:   Current diet is Limited junk food  Medications:   Patient is currently taking over-the-counter supplements  OTC medications include: see medication list  Patient is able to manage medications       Activities of Daily Living (ADLs)/Instrumental Activities of Daily Living (IADLs):   Walk and transfer into and out of bed and chair?: Yes  Dress and groom yourself?: Yes    Bathe or shower yourself?: Yes    Feed yourself? Yes  Do your laundry/housekeeping?: Yes  Manage your money, pay your bills and track your expenses?: Yes  Make your own meals?: Yes    Do your own shopping?: Yes    Previous Hospitalizations:   Any hospitalizations or ED visits within the last 12 months?: Yes    How many hospitalizations have you had in the last year?: 1-2    Advance Care Planning:   Living will: Yes    Advanced directive: Yes    End of Life Decisions reviewed with patient: Yes      PREVENTIVE SCREENINGS      Cardiovascular Screening:    General: Screening Not Indicated and History Lipid Disorder      Diabetes Screening:     General: Screening Current      Colorectal Cancer Screening:     General: Screening Not Indicated      Breast Cancer Screening:     General: Patient Declines      Cervical Cancer Screening:    General: Screening Not Indicated      Osteoporosis Screening:    General: Screening Not Indicated and History Osteoporosis      Abdominal Aortic Aneurysm (AAA) Screening:        General: Screening Not Indicated      Lung Cancer Screening:     General: Screening Not Indicated      Hepatitis C Screening:    General: Screening Not Indicated    Screening, Brief Intervention, and Referral to Treatment (SBIRT)    Screening  Typical number of drinks in a day: 0  Typical number of drinks in a week: 0  Interpretation: Low risk drinking behavior  Brief Intervention  Alcohol & drug use screenings were reviewed  No concerns regarding substance use disorder identified            Physical Exam:     /72 (BP Location: Left arm, Patient Position: Sitting, Cuff Size: Adult)   Pulse 74   Temp 98 4 °F (36 9 °C) (Tympanic)   Resp 16   Ht 5' 1 5" (1 562 m)   Wt 64 4 kg (142 lb)   SpO2 96%   BMI 26 40 kg/m²         Devante Mode, DO

## 2022-04-06 NOTE — PROGRESS NOTES
Assessment/Plan:  Patient is an almost 82year old female seen for follow up of chronic medical conditions  Impaired fasting glucose  Blood sugar 108  Acquired hypothyroidism  TSH stable  Mixed hyperlipidemia  Chol 154 and LDL 75  She is not doing home exercises  Discussed Reclast for Osteoporosis  Recheck in four months  Diagnoses and all orders for this visit:    Impaired fasting glucose    Acquired hypothyroidism    Essential hypertension    Mixed hyperlipidemia    Medicare annual wellness visit, subsequent          Subjective:   No chief complaint on file  Patient ID: Viviana Smith is a 82 y o  female  Patient is here for follow up of chronic medical conditions  She gets an uncomfortable feeling at times, sometimes dizzy, feeling tired all the time, dry mouth  She has a routine of washing, ironing, cooking  The following portions of the patient's history were reviewed and updated as appropriate: allergies, current medications, past family history, past medical history, past social history, past surgical history and problem list     Review of Systems   Constitutional: Positive for fatigue  Negative for chills and fever  HENT: Negative for congestion and sore throat  Respiratory: Negative for chest tightness  Cardiovascular: Negative for chest pain and palpitations  Gastrointestinal: Negative for abdominal pain, constipation, diarrhea and nausea  Genitourinary: Negative for difficulty urinating  Skin: Negative  Neurological: Positive for dizziness  Negative for headaches  Psychiatric/Behavioral: Negative  Objective:      /72 (BP Location: Left arm, Patient Position: Sitting, Cuff Size: Adult)   Pulse 74   Temp 98 4 °F (36 9 °C) (Tympanic)   Resp 16   Ht 5' 1 5" (1 562 m)   Wt 64 4 kg (142 lb)   SpO2 96%   BMI 26 40 kg/m²          Physical Exam  Vitals and nursing note reviewed     Constitutional:       General: She is not in acute distress  HENT:      Head: Normocephalic  Neck:      Thyroid: No thyromegaly  Cardiovascular:      Rate and Rhythm: Normal rate and regular rhythm  Heart sounds: Normal heart sounds  Pulmonary:      Effort: Pulmonary effort is normal       Breath sounds: Normal breath sounds  Musculoskeletal:      Right lower leg: No edema  Left lower leg: No edema  Lymphadenopathy:      Cervical: No cervical adenopathy  Skin:     General: Skin is warm and dry  Neurological:      Mental Status: She is alert and oriented to person, place, and time     Psychiatric:         Mood and Affect: Mood normal

## 2022-04-07 ENCOUNTER — TELEPHONE (OUTPATIENT)
Dept: ADMINISTRATIVE | Facility: OTHER | Age: 87
End: 2022-04-07

## 2022-04-07 DIAGNOSIS — J43.2 CENTRILOBULAR EMPHYSEMA (HCC): ICD-10-CM

## 2022-04-07 RX ORDER — TIOTROPIUM BROMIDE INHALATION SPRAY 3.12 UG/1
SPRAY, METERED RESPIRATORY (INHALATION)
Qty: 8 G | Refills: 0 | Status: SHIPPED | OUTPATIENT
Start: 2022-04-07 | End: 2022-06-14

## 2022-04-07 NOTE — TELEPHONE ENCOUNTER
Upon review of the In Basket request we were able to locate, review, and update the patient chart as requested for DEXA Scan  Any additional questions or concerns should be emailed to the Practice Liaisons via [email or web address] email, please do not reply via In Basket      Thank you  Chica Garcia MA

## 2022-04-07 NOTE — TELEPHONE ENCOUNTER
----- Message from Leslie Castellanos RN sent at 4/6/2022  3:37 PM EDT -----  Regarding: care gap request  04/06/22 3:37 PM    Hello, our patient attached above has had a DEXA Scan completed/performed  Please assist in updating the patient chart by making an External outreach to [facility] (results are in care everywhere) facility located in [facility] [address]  The date of service is 7/21/2021      Thank you,  Leslie Castellanos RN  [facility]

## 2022-06-05 DIAGNOSIS — I10 ESSENTIAL HYPERTENSION: ICD-10-CM

## 2022-06-06 RX ORDER — METOPROLOL SUCCINATE 100 MG/1
TABLET, EXTENDED RELEASE ORAL
Qty: 90 TABLET | Refills: 0 | Status: SHIPPED | OUTPATIENT
Start: 2022-06-06

## 2022-06-14 DIAGNOSIS — J43.2 CENTRILOBULAR EMPHYSEMA (HCC): ICD-10-CM

## 2022-06-14 RX ORDER — TIOTROPIUM BROMIDE INHALATION SPRAY 3.12 UG/1
SPRAY, METERED RESPIRATORY (INHALATION)
Qty: 8 G | Refills: 0 | Status: SHIPPED | OUTPATIENT
Start: 2022-06-14

## 2022-06-28 DIAGNOSIS — E78.2 MIXED HYPERLIPIDEMIA: ICD-10-CM

## 2022-06-28 DIAGNOSIS — Z91.89 AT HIGH RISK FOR DEEP VENOUS THROMBOSIS: ICD-10-CM

## 2022-06-28 RX ORDER — SIMVASTATIN 20 MG
TABLET ORAL
Qty: 90 TABLET | Refills: 0 | Status: SHIPPED | OUTPATIENT
Start: 2022-06-28

## 2022-06-28 RX ORDER — APIXABAN 2.5 MG/1
TABLET, FILM COATED ORAL
Qty: 180 TABLET | Refills: 0 | Status: SHIPPED | OUTPATIENT
Start: 2022-06-28

## 2022-07-05 DIAGNOSIS — I10 ESSENTIAL HYPERTENSION: ICD-10-CM

## 2022-07-05 RX ORDER — LOSARTAN POTASSIUM 100 MG/1
TABLET ORAL
Qty: 90 TABLET | Refills: 0 | Status: SHIPPED | OUTPATIENT
Start: 2022-07-05 | End: 2022-09-26

## 2022-08-02 DIAGNOSIS — E03.9 ACQUIRED HYPOTHYROIDISM: ICD-10-CM

## 2022-08-02 RX ORDER — LEVOTHYROXINE SODIUM 25 UG/1
TABLET ORAL
Qty: 90 TABLET | Refills: 0 | Status: SHIPPED | OUTPATIENT
Start: 2022-08-02

## 2022-08-09 ENCOUNTER — OFFICE VISIT (OUTPATIENT)
Dept: FAMILY MEDICINE CLINIC | Facility: CLINIC | Age: 87
End: 2022-08-09
Payer: MEDICARE

## 2022-08-09 ENCOUNTER — APPOINTMENT (OUTPATIENT)
Dept: LAB | Facility: CLINIC | Age: 87
End: 2022-08-09
Payer: MEDICARE

## 2022-08-09 VITALS
BODY MASS INDEX: 26.13 KG/M2 | OXYGEN SATURATION: 94 % | DIASTOLIC BLOOD PRESSURE: 82 MMHG | SYSTOLIC BLOOD PRESSURE: 130 MMHG | HEART RATE: 75 BPM | TEMPERATURE: 97.2 F | RESPIRATION RATE: 18 BRPM | WEIGHT: 142 LBS | HEIGHT: 62 IN

## 2022-08-09 DIAGNOSIS — L98.9 SKIN LESION OF FACE: ICD-10-CM

## 2022-08-09 DIAGNOSIS — E78.2 MIXED HYPERLIPIDEMIA: ICD-10-CM

## 2022-08-09 DIAGNOSIS — E03.9 ACQUIRED HYPOTHYROIDISM: Primary | ICD-10-CM

## 2022-08-09 DIAGNOSIS — Z85.828 HISTORY OF SKIN CANCER: ICD-10-CM

## 2022-08-09 DIAGNOSIS — G25.0 ESSENTIAL TREMOR: ICD-10-CM

## 2022-08-09 DIAGNOSIS — E03.9 ACQUIRED HYPOTHYROIDISM: ICD-10-CM

## 2022-08-09 DIAGNOSIS — I10 ESSENTIAL HYPERTENSION: ICD-10-CM

## 2022-08-09 DIAGNOSIS — F33.9 DEPRESSION, RECURRENT (HCC): ICD-10-CM

## 2022-08-09 LAB
ALBUMIN SERPL BCP-MCNC: 3.4 G/DL (ref 3.5–5)
ALP SERPL-CCNC: 43 U/L (ref 46–116)
ALT SERPL W P-5'-P-CCNC: 19 U/L (ref 12–78)
ANION GAP SERPL CALCULATED.3IONS-SCNC: 0 MMOL/L (ref 4–13)
AST SERPL W P-5'-P-CCNC: 20 U/L (ref 5–45)
BASOPHILS # BLD AUTO: 0.05 THOUSANDS/ΜL (ref 0–0.1)
BASOPHILS NFR BLD AUTO: 1 % (ref 0–1)
BILIRUB SERPL-MCNC: 0.74 MG/DL (ref 0.2–1)
BUN SERPL-MCNC: 16 MG/DL (ref 5–25)
CALCIUM ALBUM COR SERPL-MCNC: 9.5 MG/DL (ref 8.3–10.1)
CALCIUM SERPL-MCNC: 9 MG/DL (ref 8.3–10.1)
CHLORIDE SERPL-SCNC: 107 MMOL/L (ref 96–108)
CO2 SERPL-SCNC: 30 MMOL/L (ref 21–32)
CREAT SERPL-MCNC: 0.86 MG/DL (ref 0.6–1.3)
EOSINOPHIL # BLD AUTO: 0.19 THOUSAND/ΜL (ref 0–0.61)
EOSINOPHIL NFR BLD AUTO: 2 % (ref 0–6)
ERYTHROCYTE [DISTWIDTH] IN BLOOD BY AUTOMATED COUNT: 13.6 % (ref 11.6–15.1)
GFR SERPL CREATININE-BSD FRML MDRD: 61 ML/MIN/1.73SQ M
GLUCOSE P FAST SERPL-MCNC: 108 MG/DL (ref 65–99)
HCT VFR BLD AUTO: 44.9 % (ref 34.8–46.1)
HGB BLD-MCNC: 14.1 G/DL (ref 11.5–15.4)
IMM GRANULOCYTES # BLD AUTO: 0.03 THOUSAND/UL (ref 0–0.2)
IMM GRANULOCYTES NFR BLD AUTO: 0 % (ref 0–2)
LYMPHOCYTES # BLD AUTO: 1.98 THOUSANDS/ΜL (ref 0.6–4.47)
LYMPHOCYTES NFR BLD AUTO: 23 % (ref 14–44)
MCH RBC QN AUTO: 29.2 PG (ref 26.8–34.3)
MCHC RBC AUTO-ENTMCNC: 31.4 G/DL (ref 31.4–37.4)
MCV RBC AUTO: 93 FL (ref 82–98)
MONOCYTES # BLD AUTO: 0.76 THOUSAND/ΜL (ref 0.17–1.22)
MONOCYTES NFR BLD AUTO: 9 % (ref 4–12)
NEUTROPHILS # BLD AUTO: 5.68 THOUSANDS/ΜL (ref 1.85–7.62)
NEUTS SEG NFR BLD AUTO: 65 % (ref 43–75)
NRBC BLD AUTO-RTO: 0 /100 WBCS
PLATELET # BLD AUTO: 212 THOUSANDS/UL (ref 149–390)
PMV BLD AUTO: 11.9 FL (ref 8.9–12.7)
POTASSIUM SERPL-SCNC: 4.6 MMOL/L (ref 3.5–5.3)
PROT SERPL-MCNC: 7 G/DL (ref 6.4–8.4)
RBC # BLD AUTO: 4.83 MILLION/UL (ref 3.81–5.12)
SODIUM SERPL-SCNC: 137 MMOL/L (ref 135–147)
TSH SERPL DL<=0.05 MIU/L-ACNC: 1.61 UIU/ML (ref 0.45–4.5)
WBC # BLD AUTO: 8.69 THOUSAND/UL (ref 4.31–10.16)

## 2022-08-09 PROCEDURE — 84443 ASSAY THYROID STIM HORMONE: CPT

## 2022-08-09 PROCEDURE — 85025 COMPLETE CBC W/AUTO DIFF WBC: CPT

## 2022-08-09 PROCEDURE — 80053 COMPREHEN METABOLIC PANEL: CPT

## 2022-08-09 PROCEDURE — 99214 OFFICE O/P EST MOD 30 MIN: CPT | Performed by: FAMILY MEDICINE

## 2022-08-09 PROCEDURE — 36415 COLL VENOUS BLD VENIPUNCTURE: CPT

## 2022-08-09 RX ORDER — DULOXETIN HYDROCHLORIDE 30 MG/1
30 CAPSULE, DELAYED RELEASE ORAL DAILY
Qty: 30 CAPSULE | Refills: 1 | Status: SHIPPED | OUTPATIENT
Start: 2022-08-09 | End: 2022-08-25 | Stop reason: ALTCHOICE

## 2022-08-09 NOTE — PROGRESS NOTES
Assessment/Plan:    Patient seen for follow-up of chronic medical conditions  She had not had blood work prior to the visit so I did ask her to have lab work to check her thyroid, blood count and chemistry  She has me about 2 skin lesions on her face and I recommended she see Dermatology as she does have a history of skin cancer  She discussed that she was feeling down and I prescribed duloxetine and I will see her back in 4 to 6 weeks to see how she is doing  Diagnoses and all orders for this visit:    Acquired hypothyroidism  -     TSH, 3rd generation with Free T4 reflex; Future    Essential hypertension  -     CBC and differential; Future  -     Comprehensive metabolic panel; Future    Essential tremor    Mixed hyperlipidemia    Depression, recurrent (HCC)  -     DULoxetine (Cymbalta) 30 mg delayed release capsule; Take 1 capsule (30 mg total) by mouth daily    Skin lesion of face  -     Ambulatory Referral to Dermatology; Future    History of skin cancer  -     Ambulatory Referral to Dermatology; Future        Subjective:   Chief Complaint   Patient presents with    Follow-up     Pt refused mammo during rooming        Patient ID: Tre Suero is a 82 y o  female  Patient is here for follow up of chronic medical conditions  She does have a skin lesion on her left cheek that she is concerned about  Also her left eyebrow  She feels that her tremors are about the same  The following portions of the patient's history were reviewed and updated as appropriate: allergies, current medications, past family history, past medical history, past social history, past surgical history and problem list     Review of Systems   Constitutional: Negative for chills and fever  HENT: Negative for congestion and sore throat  Respiratory: Negative for chest tightness  Cardiovascular: Negative for chest pain and palpitations     Gastrointestinal: Negative for abdominal pain, constipation, diarrhea and nausea  Genitourinary: Negative for difficulty urinating  Skin: Negative  As in HPI  Neurological: Positive for tremors  Negative for dizziness and headaches  Psychiatric/Behavioral: Negative  Objective:      /82 (BP Location: Left arm, Patient Position: Sitting)   Pulse 75   Temp (!) 97 2 °F (36 2 °C) (Tympanic)   Resp 18   Ht 5' 1 5" (1 562 m)   Wt 64 4 kg (142 lb)   SpO2 94%   BMI 26 40 kg/m²          Physical Exam  Vitals and nursing note reviewed  Constitutional:       General: She is not in acute distress  HENT:      Head: Normocephalic  Neck:      Thyroid: No thyromegaly  Cardiovascular:      Rate and Rhythm: Normal rate and regular rhythm  Heart sounds: Normal heart sounds  Pulmonary:      Effort: Pulmonary effort is normal       Breath sounds: Normal breath sounds  Musculoskeletal:      Right lower leg: No edema  Left lower leg: No edema  Lymphadenopathy:      Cervical: No cervical adenopathy  Skin:     General: Skin is warm and dry  Findings: Lesion (skin lesions on face) present  Neurological:      Mental Status: She is alert and oriented to person, place, and time     Psychiatric:         Mood and Affect: Mood normal

## 2022-08-15 ENCOUNTER — TELEPHONE (OUTPATIENT)
Dept: FAMILY MEDICINE CLINIC | Facility: CLINIC | Age: 87
End: 2022-08-15

## 2022-08-15 NOTE — TELEPHONE ENCOUNTER
Pt's daughter Anu Simms called asking if pt is to continue on Eliquis? Miya was under the impression that this was a temporary medication and would be due to d/c after returning home from [facility]

## 2022-08-18 NOTE — TELEPHONE ENCOUNTER
Pt's daughter called again to check on this she also wants to know if there was some kind of shot given to her mother at the last visit  Her father is saying she got a booster  She would also like to check on what medications we have listed that she is taking  Duloxetine is causing dizziness so she doesn't want her taking it because it makes her nervous that she may fall       Please advise [phone]

## 2022-08-19 DIAGNOSIS — J43.2 CENTRILOBULAR EMPHYSEMA (HCC): ICD-10-CM

## 2022-08-19 RX ORDER — TIOTROPIUM BROMIDE INHALATION SPRAY 3.12 UG/1
SPRAY, METERED RESPIRATORY (INHALATION)
Qty: 8 G | Refills: 0 | Status: SHIPPED | OUTPATIENT
Start: 2022-08-19 | End: 2022-10-21

## 2022-08-22 NOTE — TELEPHONE ENCOUNTER
I spoke to Akila Hartley on 8/19  I am not sure why patient is still taking Eliquis and I am concerned about side effects from Duloxetine, so she will stop meds and I will ask her to come in with all her pills

## 2022-08-25 ENCOUNTER — OFFICE VISIT (OUTPATIENT)
Dept: FAMILY MEDICINE CLINIC | Facility: CLINIC | Age: 87
End: 2022-08-25
Payer: MEDICARE

## 2022-08-25 VITALS
SYSTOLIC BLOOD PRESSURE: 140 MMHG | TEMPERATURE: 97.8 F | BODY MASS INDEX: 26.13 KG/M2 | DIASTOLIC BLOOD PRESSURE: 72 MMHG | WEIGHT: 142 LBS | OXYGEN SATURATION: 94 % | HEIGHT: 62 IN | HEART RATE: 72 BPM

## 2022-08-25 DIAGNOSIS — I10 ESSENTIAL HYPERTENSION: ICD-10-CM

## 2022-08-25 DIAGNOSIS — E03.9 ACQUIRED HYPOTHYROIDISM: Primary | ICD-10-CM

## 2022-08-25 DIAGNOSIS — E78.2 MIXED HYPERLIPIDEMIA: ICD-10-CM

## 2022-08-25 PROCEDURE — 99213 OFFICE O/P EST LOW 20 MIN: CPT | Performed by: FAMILY MEDICINE

## 2022-08-25 NOTE — PROGRESS NOTES
Assessment/Plan:  Patient is seen for medication review  I had started her on an antidepressant the last visit  She did have dizziness  Her daughter became concerned and called me about her medication  Asked patient to come in to go over her medication list   Discussed meds  Stop Eliquis  We did discuss her depression and she will reach out her children for more companionship  Subjective:   Chief Complaint   Patient presents with    Medication review        Patient ID: Emeterio Brambila is a 82 y o  female  Patient is here to follow-up on medication changes  The following portions of the patient's history were reviewed and updated as appropriate: allergies, current medications, past family history, past medical history, past social history, past surgical history and problem list     Review of Systems   Constitutional: Negative for chills and fever  HENT: Negative for congestion and sore throat  Respiratory: Negative for chest tightness  Cardiovascular: Negative for chest pain and palpitations  Gastrointestinal: Negative for abdominal pain, constipation, diarrhea and nausea  Genitourinary: Negative for difficulty urinating  Skin: Negative  Neurological: Negative for dizziness and headaches  Psychiatric/Behavioral: Negative  Objective:      /72 (BP Location: Left arm)   Pulse 72   Temp 97 8 °F (36 6 °C) (Temporal)   Ht 5' 1 5" (1 562 m)   Wt 64 4 kg (142 lb)   SpO2 94%   BMI 26 40 kg/m²          Physical Exam  Vitals and nursing note reviewed  Constitutional:       General: She is not in acute distress  Neck:      Thyroid: No thyromegaly  Cardiovascular:      Rate and Rhythm: Normal rate and regular rhythm  Heart sounds: Normal heart sounds  Pulmonary:      Effort: Pulmonary effort is normal       Breath sounds: Normal breath sounds  Lymphadenopathy:      Cervical: No cervical adenopathy  Skin:     General: Skin is warm and dry  Neurological:      Mental Status: She is alert and oriented to person, place, and time

## 2022-08-28 DIAGNOSIS — I10 ESSENTIAL HYPERTENSION: ICD-10-CM

## 2022-08-29 RX ORDER — METOPROLOL SUCCINATE 100 MG/1
TABLET, EXTENDED RELEASE ORAL
Qty: 90 TABLET | Refills: 0 | Status: SHIPPED | OUTPATIENT
Start: 2022-08-29

## 2022-09-25 DIAGNOSIS — E78.2 MIXED HYPERLIPIDEMIA: ICD-10-CM

## 2022-09-25 DIAGNOSIS — I10 ESSENTIAL HYPERTENSION: ICD-10-CM

## 2022-09-26 RX ORDER — LOSARTAN POTASSIUM 100 MG/1
TABLET ORAL
Qty: 90 TABLET | Refills: 0 | Status: SHIPPED | OUTPATIENT
Start: 2022-09-26

## 2022-09-26 RX ORDER — SIMVASTATIN 20 MG
TABLET ORAL
Qty: 90 TABLET | Refills: 0 | Status: SHIPPED | OUTPATIENT
Start: 2022-09-26

## 2022-10-12 PROBLEM — S81.819A LEG LACERATION: Status: RESOLVED | Noted: 2021-10-27 | Resolved: 2022-10-12

## 2022-10-21 DIAGNOSIS — J43.2 CENTRILOBULAR EMPHYSEMA (HCC): ICD-10-CM

## 2022-10-21 RX ORDER — TIOTROPIUM BROMIDE INHALATION SPRAY 3.12 UG/1
SPRAY, METERED RESPIRATORY (INHALATION)
Qty: 8 G | Refills: 0 | Status: SHIPPED | OUTPATIENT
Start: 2022-10-21

## 2022-10-25 ENCOUNTER — OFFICE VISIT (OUTPATIENT)
Dept: FAMILY MEDICINE CLINIC | Facility: CLINIC | Age: 87
End: 2022-10-25
Payer: MEDICARE

## 2022-10-25 ENCOUNTER — APPOINTMENT (OUTPATIENT)
Dept: RADIOLOGY | Facility: CLINIC | Age: 87
End: 2022-10-25
Payer: MEDICARE

## 2022-10-25 VITALS
TEMPERATURE: 97 F | OXYGEN SATURATION: 94 % | HEART RATE: 82 BPM | SYSTOLIC BLOOD PRESSURE: 148 MMHG | HEIGHT: 62 IN | DIASTOLIC BLOOD PRESSURE: 72 MMHG | WEIGHT: 144.2 LBS | BODY MASS INDEX: 26.54 KG/M2

## 2022-10-25 DIAGNOSIS — R06.02 SHORTNESS OF BREATH: ICD-10-CM

## 2022-10-25 DIAGNOSIS — L72.9 SKIN CYST: ICD-10-CM

## 2022-10-25 DIAGNOSIS — R25.1 TREMOR: Primary | ICD-10-CM

## 2022-10-25 PROCEDURE — 99214 OFFICE O/P EST MOD 30 MIN: CPT | Performed by: FAMILY MEDICINE

## 2022-10-25 PROCEDURE — 71046 X-RAY EXAM CHEST 2 VIEWS: CPT

## 2022-10-25 NOTE — PATIENT INSTRUCTIONS
It appears that you have a cyst above your right eyebrow - it looks like there is a little pore more towards the middle of your forehead  Apply warm wash cloth - twice a day for 10 minutes  If it gets larger - call us  Medicine to help  bladder would possibly cause dry mouth or drowsiness  I think that some of your symptoms like tremor, feeling like you can't catch your breath could be "nerves"  Keep appointment to see me at end of November

## 2022-10-25 NOTE — PROGRESS NOTES
Name: Jesu Holguin      : 1935      MRN: [id number]  Encounter Provider: Tiki Hernandez DO  Encounter Date: 10/25/2022   Encounter department: [address] [place] [address]   Patient is seen for multiple complaints  Which I feel are mostly anxiety related  I am checking a chest x-ray because of her "shortness of breath-feels like she has to yawn to get her breath "  1  Tremor    2  Shortness of breath  -     XR chest pa & lateral; Future; Expected date: 10/25/2022    3  Skin cyst  Warm compresses to cyst  If does not go away probably refer to plastics  I did ask patient if it is ok if I call her daughter , Jason Wood      Chief Complaint   Patient presents with   • Shortness of Breath     Feels light headed, shaky, gassiness all the time, extremely tired all the time, no of urine and has a lump on right eyebrow  Patient complains of tremor - he Mom also had this  "Feels like nerves - like a scary feeling"  Feels that she is very gassy  She has given up elaborate cooking  She has to wear a depends during day, wears a pad at night  Feels like she has to yawn to get her breath up  Shortness of Breath  Pertinent negatives include no chest pain, dizziness, palpitations or sore throat  Review of Systems   Constitutional: Negative for chills and fever  HENT: Negative for congestion and sore throat  Respiratory: Positive for shortness of breath  Negative for chest tightness  Cardiovascular: Negative for chest pain and palpitations  Gastrointestinal: Negative for abdominal pain, constipation, diarrhea and nausea  Gassy   Genitourinary: Negative for difficulty urinating  Skin: Negative  lum on right eyebrow   Neurological: Positive for tremors  Negative for dizziness and headaches  Psychiatric/Behavioral: The patient is nervous/anxious          Current Outpatient Medications on File Prior to Visit   Medication Sig   • ascorbic acid (VITAMIN C) 1000 MG tablet Take 1,000 mg by mouth daily   • aspirin (ECOTRIN LOW STRENGTH) 81 mg EC tablet Take 81 mg by mouth daily   • Cholecalciferol (Vitamin D3) 25 MCG (1000 UT) CAPS Take by mouth   • Euthyrox 25 MCG tablet Take 1 tablet by mouth once daily   • losartan (COZAAR) 100 MG tablet Take 1 tablet by mouth once daily   • metoprolol succinate (TOPROL-XL) 100 mg 24 hr tablet Take 1 tablet by mouth once daily   • simvastatin (ZOCOR) 20 mg tablet TAKE 1 TABLET BY MOUTH ONCE DAILY AT BEDTIME   • Spiriva Respimat 2 5 MCG/ACT AERS inhaler INHALE 2 PUFFS BY MOUTH ONCE DAILY   • acetaminophen (TYLENOL) 500 mg tablet Take 1,000 mg by mouth every 8 (eight) hours (Patient not taking: No sig reported)   • zoledronic acid (RECLAST) 5 mg/100 mL IV infusion (premix) Infuse 5 mg into a venous catheter once   (Patient not taking: No sig reported)       Objective     /72   Pulse 82   Temp (!) 97 °F (36 1 °C) (Tympanic)   Ht 5' 1 5" (1 562 m)   Wt 65 4 kg (144 lb 3 2 oz)   SpO2 94%   BMI 26 81 kg/m²     Physical Exam  Vitals and nursing note reviewed  Constitutional:       General: She is not in acute distress  HENT:      Head:      Comments: cys right eyebrow with a tract towards mid forehead - like a  Sebaceous cyst   Neck:      Thyroid: No thyromegaly  Cardiovascular:      Rate and Rhythm: Normal rate and regular rhythm  Heart sounds: Normal heart sounds  Pulmonary:      Effort: Pulmonary effort is normal       Breath sounds: Normal breath sounds  No wheezing, rhonchi or rales  Abdominal:      General: Bowel sounds are normal       Palpations: Abdomen is soft  Musculoskeletal:      Right lower leg: No edema  Left lower leg: No edema  Lymphadenopathy:      Cervical: No cervical adenopathy  Skin:     General: Skin is warm and dry  Neurological:      Mental Status: She is alert and oriented to person, place, and time  Comments: Hands are a little shakey     Psychiatric: Mood and Affect: Mood normal        Alexa Willams, DO

## 2022-10-28 ENCOUNTER — TELEPHONE (OUTPATIENT)
Dept: FAMILY MEDICINE CLINIC | Facility: CLINIC | Age: 87
End: 2022-10-28

## 2022-10-28 NOTE — TELEPHONE ENCOUNTER
Miya called back regarding Dr Perry Comment call  You may return call anytime Monday or Tuesday evening you can return her call after 6pm, she will make sure she has her phone on [phone]

## 2022-11-01 NOTE — TELEPHONE ENCOUNTER
I spoke to daughter last evening  She will call her mom about her symptoms  I told her that patient does have an appointment to see me at the end of the month and I will continue to provide reassurance  She would prefer that I do not prescribe an antianxiety agent at this time

## 2022-11-27 DIAGNOSIS — I10 ESSENTIAL HYPERTENSION: ICD-10-CM

## 2022-11-28 RX ORDER — METOPROLOL SUCCINATE 100 MG/1
TABLET, EXTENDED RELEASE ORAL
Qty: 90 TABLET | Refills: 0 | Status: SHIPPED | OUTPATIENT
Start: 2022-11-28

## 2022-11-29 ENCOUNTER — RA CDI HCC (OUTPATIENT)
Dept: OTHER | Facility: HOSPITAL | Age: 87
End: 2022-11-29

## 2022-11-29 NOTE — PROGRESS NOTES
[address]  coding opportunities       Chart reviewed, no opportunity found: CHART REVIEWED, NO OPPORTUNITY FOUND        Patients Insurance     Medicare Insurance: Medicare

## 2022-12-07 ENCOUNTER — OFFICE VISIT (OUTPATIENT)
Dept: FAMILY MEDICINE CLINIC | Facility: CLINIC | Age: 87
End: 2022-12-07

## 2022-12-07 VITALS
DIASTOLIC BLOOD PRESSURE: 64 MMHG | SYSTOLIC BLOOD PRESSURE: 130 MMHG | BODY MASS INDEX: 26.17 KG/M2 | WEIGHT: 142.2 LBS | HEIGHT: 62 IN | HEART RATE: 78 BPM | TEMPERATURE: 96.1 F | OXYGEN SATURATION: 97 %

## 2022-12-07 DIAGNOSIS — I10 ESSENTIAL HYPERTENSION: ICD-10-CM

## 2022-12-07 DIAGNOSIS — H00.11 CHALAZION OF RIGHT UPPER EYELID: ICD-10-CM

## 2022-12-07 DIAGNOSIS — R73.01 IMPAIRED FASTING GLUCOSE: ICD-10-CM

## 2022-12-07 DIAGNOSIS — E03.9 ACQUIRED HYPOTHYROIDISM: Primary | ICD-10-CM

## 2022-12-07 DIAGNOSIS — Z23 NEED FOR VACCINATION: ICD-10-CM

## 2022-12-07 NOTE — PATIENT INSTRUCTIONS
Apply warm compresses to right upper eyelid for 10 minutes at a time  Do this four times a day  Use a warm washcloth  I have also prescribed antibiotic ointment  Apply a small amount to upper eyelid with a q tip three times a day  Have blood work - fasting before next visit

## 2022-12-07 NOTE — PROGRESS NOTES
----- Message from Michele Godoy sent at 7/12/2019 11:55 AM CDT -----  Regarding: Lab Test or Test Related Question  Contact: [phone]  I made an appointment with Dr. 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  Can you please have Dr. Robbins put orders out in the system for an EKG.  I will schedule it with [facility] for July 30th following a lab appointment that I have for blood work.   Name: Juan Beckwith      : 1935      MRN: [id number]  Encounter Provider: Tomeka Marie DO  Encounter Date: 2022   Encounter department: [address] [place] [address]     1  Acquired hypothyroidism  -     Lipid Panel with Direct LDL reflex; Future; Expected date: 2023  -     TSH, 3rd generation with Free T4 reflex; Future; Expected date: 2023  -     CBC and differential; Future; Expected date: 2023    2  Impaired fasting glucose  -     Lipid Panel with Direct LDL reflex; Future; Expected date: 2023  -     Comprehensive metabolic panel; Future; Expected date: 2023  -     CBC and differential; Future; Expected date: 2023    3  Essential hypertension  -     Lipid Panel with Direct LDL reflex; Future; Expected date: 2023  -     Comprehensive metabolic panel; Future; Expected date: 2023  -     TSH, 3rd generation with Free T4 reflex; Future; Expected date: 2023  -     CBC and differential; Future; Expected date: 2023    4  Need for vaccination  -     influenza vaccine, high-dose, PF 0 7 mL (FLUZONE HIGH-DOSE)    5  Chalazion of right upper eyelid  Reassured patient that her recent blood work and chest xray were ok  Will try to stay away from more medication at children's request   Given flu shot today  Blood work prior to next visit  Subjective      Chief Complaint   Patient presents with   • Follow-up       Patient is seen for follow up of chronic medical conditions  She complains of sore and swelling of right upper eyelid  Patient with simillar complaints as last office visit - tremulous, tired, short of breath, no control of urine    Review of Systems   Constitutional: Positive for fatigue  Negative for chills and fever  HENT: Negative for congestion and sore throat  Eyes: Positive for discharge  Respiratory: Negative for chest tightness  Cardiovascular: Negative for chest pain and palpitations  Gastrointestinal: Negative for abdominal pain, constipation, diarrhea and nausea  Genitourinary: Negative for difficulty urinating  Skin: Negative  Neurological: Positive for tremors  Negative for dizziness and headaches  Psychiatric/Behavioral: Negative  Current Outpatient Medications on File Prior to Visit   Medication Sig   • ascorbic acid (VITAMIN C) 1000 MG tablet Take 1,000 mg by mouth daily   • aspirin (ECOTRIN LOW STRENGTH) 81 mg EC tablet Take 81 mg by mouth daily   • Cholecalciferol (Vitamin D3) 25 MCG (1000 UT) CAPS Take by mouth   • levothyroxine 25 mcg tablet Take 1 tablet by mouth once daily   • metoprolol succinate (TOPROL-XL) 100 mg 24 hr tablet Take 1 tablet by mouth once daily   • Spiriva Respimat 2 5 MCG/ACT AERS inhaler INHALE 2 PUFFS BY MOUTH ONCE DAILY   • acetaminophen (TYLENOL) 500 mg tablet Take 1,000 mg by mouth every 8 (eight) hours (Patient not taking: Reported on 8/25/2022)   • zoledronic acid (RECLAST) 5 mg/100 mL IV infusion (premix) Infuse 5 mg into a venous catheter once   (Patient not taking: Reported on 8/25/2022)       Objective     /64   Pulse 78   Temp (!) 96 1 °F (35 6 °C) (Tympanic)   Ht 5' 1 5" (1 562 m)   Wt 64 5 kg (142 lb 3 2 oz)   SpO2 97%   BMI 26 43 kg/m²     Physical Exam  Vitals and nursing note reviewed  Constitutional:       General: She is not in acute distress  HENT:      Head: Normocephalic  Eyes:      Comments: Sty upper outer canthus   Neck:      Thyroid: No thyromegaly  Cardiovascular:      Rate and Rhythm: Normal rate and regular rhythm  Heart sounds: Normal heart sounds  Pulmonary:      Effort: Pulmonary effort is normal       Breath sounds: Normal breath sounds  Musculoskeletal:      Right lower leg: No edema  Left lower leg: No edema  Lymphadenopathy:      Cervical: No cervical adenopathy  Skin:     General: Skin is warm and dry     Neurological:      Mental Status: She is alert and oriented to person, place, and time     Psychiatric:         Mood and Affect: Mood normal        Simi Powers, DO

## 2022-12-08 ENCOUNTER — TELEPHONE (OUTPATIENT)
Dept: FAMILY MEDICINE CLINIC | Facility: CLINIC | Age: 87
End: 2022-12-08

## 2022-12-08 DIAGNOSIS — H00.11 CHALAZION OF RIGHT UPPER EYELID: Primary | ICD-10-CM

## 2022-12-08 RX ORDER — ERYTHROMYCIN 5 MG/G
0.5 OINTMENT OPHTHALMIC
Qty: 3.5 G | Refills: 0 | Status: SHIPPED | OUTPATIENT
Start: 2022-12-08 | End: 2023-08-10 | Stop reason: ALTCHOICE

## 2022-12-08 NOTE — TELEPHONE ENCOUNTER
Spoke with [facility]  The eye ointment that was prescribe is not available  A cheaper eye ointment would be Erythromycin

## 2022-12-08 NOTE — TELEPHONE ENCOUNTER
Pt lvm stating that the eye medication prescribe cost $300 and is wondering if there something else you can recommend?

## 2023-01-04 DIAGNOSIS — I10 ESSENTIAL HYPERTENSION: ICD-10-CM

## 2023-01-04 RX ORDER — METOPROLOL SUCCINATE 100 MG/1
100 TABLET, EXTENDED RELEASE ORAL DAILY
Qty: 5 TABLET | Refills: 0 | Status: SHIPPED | OUTPATIENT
Start: 2023-01-04 | End: 2023-03-06

## 2023-01-18 DIAGNOSIS — J43.2 CENTRILOBULAR EMPHYSEMA (HCC): ICD-10-CM

## 2023-01-18 RX ORDER — TIOTROPIUM BROMIDE INHALATION SPRAY 3.12 UG/1
SPRAY, METERED RESPIRATORY (INHALATION)
Qty: 8 G | Refills: 0 | Status: SHIPPED | OUTPATIENT
Start: 2023-01-18

## 2023-02-21 DIAGNOSIS — J43.2 CENTRILOBULAR EMPHYSEMA (HCC): ICD-10-CM

## 2023-02-21 RX ORDER — TIOTROPIUM BROMIDE INHALATION SPRAY 3.12 UG/1
SPRAY, METERED RESPIRATORY (INHALATION)
Qty: 8 G | Refills: 0 | Status: SHIPPED | OUTPATIENT
Start: 2023-02-21

## 2023-03-05 DIAGNOSIS — I10 ESSENTIAL HYPERTENSION: ICD-10-CM

## 2023-03-06 RX ORDER — METOPROLOL SUCCINATE 100 MG/1
TABLET, EXTENDED RELEASE ORAL
Qty: 90 TABLET | Refills: 1 | Status: SHIPPED | OUTPATIENT
Start: 2023-03-06

## 2023-03-10 ENCOUNTER — OFFICE VISIT (OUTPATIENT)
Dept: FAMILY MEDICINE CLINIC | Facility: CLINIC | Age: 88
End: 2023-03-10

## 2023-03-10 VITALS
OXYGEN SATURATION: 95 % | HEART RATE: 75 BPM | WEIGHT: 136.4 LBS | HEIGHT: 60 IN | SYSTOLIC BLOOD PRESSURE: 158 MMHG | DIASTOLIC BLOOD PRESSURE: 92 MMHG | TEMPERATURE: 97.5 F | BODY MASS INDEX: 26.78 KG/M2

## 2023-03-10 DIAGNOSIS — K62.89 RECTAL PAIN: Primary | ICD-10-CM

## 2023-03-10 DIAGNOSIS — K64.9 HEMORRHOIDS, UNSPECIFIED HEMORRHOID TYPE: ICD-10-CM

## 2023-03-10 NOTE — PROGRESS NOTES
Assessment/Plan:     1  Rectal pain  Assessment & Plan:  No palpable rectal mass; suspect discomfort is from hemorrhoids but given sx and age, advised colorectal surgery for further evaluation; declines trial of proctofoam as she is more worried about cancer than discomfort    Orders:  -     Ambulatory Referral to Colorectal Surgery; Future    2  Hemorrhoids, unspecified hemorrhoid type  -     Ambulatory Referral to Colorectal Surgery; Future        Subjective:      Patient ID: Srinivas Kwan is a 82 y o  female  Patient is here with concerns of rectal pain  Feels like there is a lump in rectum  No known injuries  Pain seems to be central  No known injury  BM are normal and regular  No blood in stool but does have some blood when she wipes at times  Pt states symptoms started about 2 weeks ago  No radiation down legs  Seems to be central  Pt states pain is more aching and discomfort rather than significant pain  No recent colon cancer screening  No known Fhx of cancer  The following portions of the patient's history were reviewed and updated as appropriate: allergies, current medications, past family history, past medical history, past social history, past surgical history, and problem list     Review of Systems   Constitutional: Negative for chills and fever  Gastrointestinal: Positive for rectal pain  Negative for abdominal pain, anal bleeding, blood in stool, constipation and diarrhea  Objective:      /92 (BP Location: Left arm, Patient Position: Sitting, Cuff Size: Standard)   Pulse 75   Temp 97 5 °F (36 4 °C)   Ht 5' 0 25" (1 53 m)   Wt 61 9 kg (136 lb 6 4 oz)   SpO2 95%   BMI 26 42 kg/m²          Physical Exam  Vitals reviewed  Constitutional:       General: She is not in acute distress  Appearance: Normal appearance  She is not ill-appearing, toxic-appearing or diaphoretic  HENT:      Head: Normocephalic and atraumatic  Eyes:      General: No scleral icterus  Right eye: No discharge  Left eye: No discharge  Conjunctiva/sclera: Conjunctivae normal    Cardiovascular:      Rate and Rhythm: Normal rate and regular rhythm  Pulses: Normal pulses  Heart sounds: Normal heart sounds  No murmur heard  No gallop  Pulmonary:      Effort: Pulmonary effort is normal  No respiratory distress  Breath sounds: Normal breath sounds  No stridor  No wheezing, rhonchi or rales  Genitourinary:     Rectum: Tenderness and external hemorrhoid present  No mass  Normal anal tone  Musculoskeletal:      Lumbar back: Normal       Right lower leg: No edema  Left lower leg: No edema  Comments: 5/5 strength of LE B/L   Neurological:      General: No focal deficit present  Mental Status: She is alert and oriented to person, place, and time  Psychiatric:         Mood and Affect: Mood normal          Behavior: Behavior normal          Thought Content:  Thought content normal          Judgment: Judgment normal

## 2023-03-10 NOTE — PATIENT INSTRUCTIONS
Your discomfort appears to be a hemorrhoid  I can order cream for you to try to see if helps with discomfort  I would like you to follow up with colorectal surgery for further evaluation

## 2023-03-10 NOTE — ASSESSMENT & PLAN NOTE
No palpable rectal mass; suspect discomfort is from hemorrhoids but given sx and age, advised colorectal surgery for further evaluation; declines trial of proctofoam as she is more worried about cancer than discomfort

## 2023-03-13 ENCOUNTER — TELEPHONE (OUTPATIENT)
Dept: FAMILY MEDICINE CLINIC | Facility: CLINIC | Age: 88
End: 2023-03-13

## 2023-03-13 DIAGNOSIS — K64.9 HEMORRHOIDS, UNSPECIFIED HEMORRHOID TYPE: ICD-10-CM

## 2023-03-13 DIAGNOSIS — K62.89 RECTAL PAIN: Primary | ICD-10-CM

## 2023-03-13 NOTE — TELEPHONE ENCOUNTER
Pt called stating that the referred Dr Twan Herrera only had appts in [facility], she is asking is there another doctor closer to home       Please advise [phone]

## 2023-03-21 DIAGNOSIS — I10 ESSENTIAL HYPERTENSION: ICD-10-CM

## 2023-03-21 RX ORDER — LOSARTAN POTASSIUM 100 MG/1
TABLET ORAL
Qty: 90 TABLET | Refills: 0 | Status: SHIPPED | OUTPATIENT
Start: 2023-03-21

## 2023-04-03 ENCOUNTER — RA CDI HCC (OUTPATIENT)
Dept: OTHER | Facility: HOSPITAL | Age: 88
End: 2023-04-03

## 2023-04-10 ENCOUNTER — OFFICE VISIT (OUTPATIENT)
Dept: FAMILY MEDICINE CLINIC | Facility: CLINIC | Age: 88
End: 2023-04-10

## 2023-04-10 VITALS
HEART RATE: 66 BPM | DIASTOLIC BLOOD PRESSURE: 72 MMHG | SYSTOLIC BLOOD PRESSURE: 134 MMHG | WEIGHT: 135.6 LBS | TEMPERATURE: 98.4 F | HEIGHT: 60 IN | BODY MASS INDEX: 26.62 KG/M2 | OXYGEN SATURATION: 97 %

## 2023-04-10 DIAGNOSIS — E03.9 ACQUIRED HYPOTHYROIDISM: ICD-10-CM

## 2023-04-10 DIAGNOSIS — G25.0 ESSENTIAL TREMOR: ICD-10-CM

## 2023-04-10 DIAGNOSIS — R63.4 WEIGHT LOSS: ICD-10-CM

## 2023-04-10 DIAGNOSIS — Z00.00 MEDICARE ANNUAL WELLNESS VISIT, SUBSEQUENT: ICD-10-CM

## 2023-04-10 DIAGNOSIS — E78.2 MIXED HYPERLIPIDEMIA: ICD-10-CM

## 2023-04-10 DIAGNOSIS — I10 ESSENTIAL HYPERTENSION: Primary | ICD-10-CM

## 2023-04-10 DIAGNOSIS — F41.9 ANXIETY: ICD-10-CM

## 2023-04-10 PROBLEM — K62.5 RECTAL BLEEDING: Status: ACTIVE | Noted: 2023-03-22

## 2023-04-10 RX ORDER — POLYETHYLENE GLYCOL 400 AND PROPYLENE GLYCOL 4; 3 MG/ML; MG/ML
SOLUTION/ DROPS OPHTHALMIC
COMMUNITY

## 2023-04-10 NOTE — PROGRESS NOTES
Assessment and Plan:     Problem List Items Addressed This Visit        Endocrine    Acquired hypothyroidism    Relevant Orders    TSH, 3rd generation with Free T4 reflex (Completed)       Cardiovascular and Mediastinum    Essential hypertension - Primary       Nervous and Auditory    Essential tremor       Other    Mixed hyperlipidemia   Other Visit Diagnoses     Medicare annual wellness visit, subsequent        Weight loss        Relevant Orders    TSH, 3rd generation with Free T4 reflex (Completed)    Anxiety        Relevant Orders    TSH, 3rd generation with Free T4 reflex (Completed)        BMI Counseling: Body mass index is 26 26 kg/m²  The BMI is above normal  Nutrition recommendations include encouraging healthy choices of fruits and vegetables, moderation in carbohydrate intake, increasing intake of lean protein and reducing intake of saturated and trans fat  Exercise recommendations include moderate physical activity 150 minutes/week  No pharmacotherapy was ordered  Rationale for BMI follow-up plan is due to patient being overweight or obese  Preventive health issues were discussed with patient, and age appropriate screening tests were ordered as noted in patient's After Visit Summary  Personalized health advice and appropriate referrals for health education or preventive services given if needed, as noted in patient's After Visit Summary       History of Present Illness:     Patient presents for a Medicare Wellness Visit       Patient Care Team:  Benny Mullins DO as PCP - General (Family Medicine)     Review of Systems:          Problem List:     Patient Active Problem List   Diagnosis   • Essential hypertension   • Mixed hyperlipidemia   • Urge incontinence   • Spinal stenosis   • Shoulder pain   • Secondary osteoarthritis of shoulder, left   • Peripheral vascular disease ([address] )   • Acquired hypothyroidism   • Centrilobular emphysema ([address] )   • Allergic rhinitis   • Esophageal reflux   • Enthesopathy of hip region   • Diastolic dysfunction   • Degeneration of intervertebral disc of lumbosacral region   • Cough   • Chondromalacia patellae   • Fatigue   • Adrenal adenoma   • Essential tremor   • Fibromyalgia   • Fibromyositis   • Hearing loss   • Impaired fasting glucose   • Localized, primary osteoarthritis   • Localized, secondary osteoarthritis of the shoulder region   • Lumbar spondylosis   • Lumbosacral radiculitis   • Melanoma of skin (HCC)   • Osteoporosis   • Sleep disturbance   • Depression, recurrent (HCC)   • Ambulatory dysfunction   • History of pulmonary embolus (PE)   • Tibial plateau fracture, right   • Cerebrovascular disease   • Rectal pain   • Rectal bleeding      Past Medical and Surgical History:     Past Medical History:   Diagnosis Date   • Depression    • Disease of thyroid gland    • Hypertension    • Pulmonary embolism ([facility] ) 8/7/2017    Last Assessment & Plan:  Due to persistent clot/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 patient should be on indefinite anticoagulation until the  risk outweighs the benefit  Message sent to Dr Tian Burtno office regarding the same       Past Surgical History:   Procedure Laterality Date   • HYSTERECTOMY     • TOTAL SHOULDER ARTHROPLASTY Left       Family History:     Family History   Problem Relation Age of Onset   • Heart attack Mother    • Cerebral aneurysm Father    • Diabetes Sister       Social History:     Social History     Socioeconomic History   • Marital status: /Civil Union     Spouse name: None   • Number of children: None   • Years of education: None   • Highest education level: None   Occupational History   • None   Tobacco Use   • Smoking status: Former   • Smokeless tobacco: Never   Vaping Use   • Vaping Use: Never used   Substance and Sexual Activity   • Alcohol use: Not Currently   • Drug use: Never   • Sexual activity: None   Other Topics Concern   • None   Social History Narrative   • None     Social Determinants of Health     Financial Resource Strain: Low Risk    • Difficulty of Paying Living Expenses: Not hard at all   Food Insecurity: Not on file   Transportation Needs: No Transportation Needs   • Lack of Transportation (Medical): No   • Lack of Transportation (Non-Medical): No   Physical Activity: Not on file   Stress: Not on file   Social Connections: Not on file   Intimate Partner Violence: Not on file   Housing Stability: Not on file      Medications and Allergies:     Current Outpatient Medications   Medication Sig Dispense Refill   • ascorbic acid (VITAMIN C) 1000 MG tablet Take 1,000 mg by mouth daily     • aspirin (ECOTRIN LOW STRENGTH) 81 mg EC tablet Take 81 mg by mouth daily     • Cholecalciferol (Vitamin D3) 25 MCG (1000 UT) CAPS Take by mouth     • erythromycin (ILOTYCIN) ophthalmic ointment Administer 0 5 inches to the right eye daily at bedtime 3 5 g 0   • levothyroxine 25 mcg tablet Take 1 tablet by mouth once daily 90 tablet 1   • losartan (COZAAR) 100 MG tablet Take 1 tablet by mouth once daily 90 tablet 0   • metoprolol succinate (TOPROL-XL) 100 mg 24 hr tablet Take 1 tablet by mouth once daily 90 tablet 1   • polyethylene glycol-propylene glycol (Systane) 0 4-0 3 %      • simvastatin (ZOCOR) 20 mg tablet TAKE 1 TABLET BY MOUTH ONCE DAILY AT BEDTIME 90 tablet 1   • Spiriva Respimat 2 5 MCG/ACT AERS inhaler INHALE 2 SPRAY(S) BY MOUTH ONCE DAILY 8 g 0   • zoledronic acid (RECLAST) 5 mg/100 mL IV infusion (premix) Inject 5 mg into a catheter in a vein once (Patient not taking: Reported on 4/10/2023)       No current facility-administered medications for this visit       No Known Allergies   Immunizations:     Immunization History   Administered Date(s) Administered   • COVID-19 MODERNA VACC 0 5 ML IM 01/25/2021, 03/01/2021, 12/03/2021   • INFLUENZA 10/29/2009, 09/12/2011, 10/25/2012, 10/10/2013, 11/08/2014, 09/27/2016, 10/12/2017, 10/30/2018, 10/12/2020, 11/03/2021   • Influenza Split High Dose Preservative Free IM 09/27/2016   • Influenza, Seasonal Vaccine, Quadrivalent, Adjuvanted,  5e 11/03/2021   • Influenza, high dose seasonal 0 7 mL 12/07/2022   • Influenza, seasonal, injectable, preservative free 10/01/2019   • Pneumococcal Conjugate 13-Valent 11/17/2015   • Pneumococcal Polysaccharide PPV23 11/24/2009   • Tdap 08/09/2012, 02/18/2017, 11/03/2021      Health Maintenance:         Topic Date Due   • DXA SCAN  07/15/2023         Topic Date Due   • COVID-19 Vaccine (4 - Booster for Paresh Morales series) 01/28/2022      Medicare Screening Tests and Risk Assessments:     Nafisa Goyal is here for her Subsequent Wellness visit  Last Medicare Wellness visit information reviewed, patient interviewed and updates made to the record today  Health Risk Assessment:   Patient rates overall health as fair  Patient feels that their physical health rating is same  Patient is satisfied with their life  Eyesight was rated as same  Hearing was rated as same  Patient feels that their emotional and mental health rating is same  Patients states they are sometimes angry  Patient states they are sometimes unusually tired/fatigued  Pain experienced in the last 7 days has been none  Patient states that she has experienced no weight loss or gain in last 6 months  Depression Screening:   PHQ-9 Score: 3      Fall Risk Screening: In the past year, patient has experienced: no history of falling in past year      Urinary Incontinence Screening:   Patient has not leaked urine accidently in the last six months  Home Safety:  Patient does not have trouble with stairs inside or outside of their home  Patient has working smoke alarms and has working carbon monoxide detector  Home safety hazards include: none  Nutrition:   Current diet is Regular  Medications:   Patient is currently taking over-the-counter supplements  OTC medications include: see medication list  Patient is able to manage medications       Activities of Daily Living (ADLs)/Instrumental Activities of "Daily Living (IADLs):   Walk and transfer into and out of bed and chair?: Yes  Dress and groom yourself?: Yes    Bathe or shower yourself?: Yes    Feed yourself? Yes  Do your laundry/housekeeping?: Yes  Manage your money, pay your bills and track your expenses?: Yes  Make your own meals?: Yes    Do your own shopping?: Yes    Previous Hospitalizations:   Any hospitalizations or ED visits within the last 12 months?: Yes    How many hospitalizations have you had in the last year?: 1-2    Hospitalization Comments: Hemorhoid removal      Advance Care Planning:   Living will: Yes    Durable POA for healthcare: Yes    Advanced directive: Yes    Five wishes given: Yes    End of Life Decisions reviewed with patient: Yes      Cognitive Screening:   Provider or family/friend/caregiver concerned regarding cognition?: No    PREVENTIVE SCREENINGS      Cardiovascular Screening:    General: Screening Not Indicated and History Lipid Disorder      Diabetes Screening:     General: Screening Current      Colorectal Cancer Screening:     General: Screening Not Indicated      Breast Cancer Screening:     General: Screening Not Indicated      Cervical Cancer Screening:    General: Screening Not Indicated      Osteoporosis Screening:    General: Screening Not Indicated and History Osteoporosis      Abdominal Aortic Aneurysm (AAA) Screening:        General: Screening Not Indicated      Lung Cancer Screening:     General: Screening Not Indicated      Hepatitis C Screening:    General: Screening Not Indicated    Screening, Brief Intervention, and Referral to Treatment (SBIRT)    Screening  Typical number of drinks in a day: 0  Typical number of drinks in a week: 0  Interpretation: Low risk drinking behavior  Brief Intervention  Alcohol & drug use screenings were reviewed  No concerns regarding substance use disorder identified  No results found       Physical Exam:     /72   Pulse 66   Temp 98 4 °F (36 9 °C)   Ht 5' 0 25\" (1 53 " m)   Wt 61 5 kg (135 lb 9 6 oz)   SpO2 97%   BMI 26 26 kg/m²         Lyn Jasso DO

## 2023-04-10 NOTE — PROGRESS NOTES
Name: Delisa Hairston      : 1935      MRN: [id number]  Encounter Provider: Jemima Romero DO  Encounter Date: 4/10/2023   Encounter department: [address] [place] [address]   Patient is an 82year old female seen for follow up of chronic medical conditions  1  Essential hypertension    2  Mixed hyperlipidemia    3  Acquired hypothyroidism  -     TSH, 3rd generation with Free T4 reflex; Future; Expected date: 04/10/2023    4  Essential tremor    5  Medicare annual wellness visit, subsequent    6  Weight loss  -     TSH, 3rd generation with Free T4 reflex; Future; Expected date: 04/10/2023    7  Anxiety  -     TSH, 3rd generation with Free T4 reflex; Future; Expected date: 04/10/2023  We did discuss her anxiety and tremors  This is a continuing complaint from previous visits  We have tried escitalopram but she had side effects  Her family is concerned thatShe did have surgery for hemorrhoids and then ended up in ER with bleeding- it was a clot from surgery  Recheck in 4 months  Subjective      Chief Complaint   Patient presents with   • Medicare Wellness Visit   • Follow-up       Patient is here for follow up of chronic medical conditions  She does admit to feeling anxious, shakey at times  Review of Systems   Constitutional: Negative for chills and fever  HENT: Negative for congestion and sore throat  Respiratory: Negative for chest tightness  Cardiovascular: Negative for chest pain and palpitations  Gastrointestinal: Negative for abdominal pain, constipation, diarrhea and nausea  Genitourinary: Negative for difficulty urinating  Skin: Negative  Neurological: Positive for tremors  Negative for dizziness and headaches  Psychiatric/Behavioral: The patient is nervous/anxious          Current Outpatient Medications on File Prior to Visit   Medication Sig   • ascorbic acid (VITAMIN C) 1000 MG tablet Take 1,000 mg by mouth daily   • aspirin (ECOTRIN LOW STRENGTH) "81 mg EC tablet Take 81 mg by mouth daily   • Cholecalciferol (Vitamin D3) 25 MCG (1000 UT) CAPS Take by mouth   • erythromycin (ILOTYCIN) ophthalmic ointment Administer 0 5 inches to the right eye daily at bedtime   • levothyroxine 25 mcg tablet Take 1 tablet by mouth once daily   • losartan (COZAAR) 100 MG tablet Take 1 tablet by mouth once daily   • metoprolol succinate (TOPROL-XL) 100 mg 24 hr tablet Take 1 tablet by mouth once daily   • polyethylene glycol-propylene glycol (Systane) 0 4-0 3 %    • simvastatin (ZOCOR) 20 mg tablet TAKE 1 TABLET BY MOUTH ONCE DAILY AT BEDTIME   • Spiriva Respimat 2 5 MCG/ACT AERS inhaler INHALE 2 SPRAY(S) BY MOUTH ONCE DAILY   • zoledronic acid (RECLAST) 5 mg/100 mL IV infusion (premix) Inject 5 mg into a catheter in a vein once (Patient not taking: Reported on 4/10/2023)       Objective     /72   Pulse 66   Temp 98 4 °F (36 9 °C)   Ht 5' 0 25\" (1 53 m)   Wt 61 5 kg (135 lb 9 6 oz)   SpO2 97%   BMI 26 26 kg/m²     Physical Exam  Vitals and nursing note reviewed  Constitutional:       General: She is not in acute distress  HENT:      Head: Normocephalic  Right Ear: Tympanic membrane normal       Left Ear: Tympanic membrane normal    Eyes:      General: No scleral icterus  Neck:      Thyroid: No thyromegaly  Vascular: No carotid bruit  Cardiovascular:      Rate and Rhythm: Normal rate and regular rhythm  Heart sounds: Normal heart sounds  Pulmonary:      Effort: Pulmonary effort is normal       Breath sounds: Normal breath sounds  Musculoskeletal:      Right lower leg: No edema  Left lower leg: No edema  Lymphadenopathy:      Cervical: No cervical adenopathy  Skin:     General: Skin is warm and dry  Neurological:      Mental Status: She is alert and oriented to person, place, and time        Comments: Hand tremor   Psychiatric:         Mood and Affect: Mood normal        Jemima Romero DO"

## 2023-06-01 DIAGNOSIS — E03.9 ACQUIRED HYPOTHYROIDISM: ICD-10-CM

## 2023-06-01 RX ORDER — LEVOTHYROXINE SODIUM 0.03 MG/1
TABLET ORAL
Qty: 90 TABLET | Refills: 0 | Status: SHIPPED | OUTPATIENT
Start: 2023-06-01

## 2023-06-09 DIAGNOSIS — J43.2 CENTRILOBULAR EMPHYSEMA (HCC): ICD-10-CM

## 2023-06-09 RX ORDER — TIOTROPIUM BROMIDE INHALATION SPRAY 3.12 UG/1
SPRAY, METERED RESPIRATORY (INHALATION)
Qty: 8 G | Refills: 0 | Status: SHIPPED | OUTPATIENT
Start: 2023-06-09

## 2023-06-25 DIAGNOSIS — E78.2 MIXED HYPERLIPIDEMIA: ICD-10-CM

## 2023-06-26 RX ORDER — SIMVASTATIN 20 MG
TABLET ORAL
Qty: 90 TABLET | Refills: 0 | Status: SHIPPED | OUTPATIENT
Start: 2023-06-26

## 2023-07-30 DIAGNOSIS — I10 ESSENTIAL HYPERTENSION: ICD-10-CM

## 2023-07-31 RX ORDER — LOSARTAN POTASSIUM 100 MG/1
TABLET ORAL
Qty: 90 TABLET | Refills: 0 | Status: SHIPPED | OUTPATIENT
Start: 2023-07-31

## 2023-08-10 ENCOUNTER — OFFICE VISIT (OUTPATIENT)
Dept: FAMILY MEDICINE CLINIC | Facility: CLINIC | Age: 88
End: 2023-08-10
Payer: MEDICARE

## 2023-08-10 VITALS
TEMPERATURE: 98.7 F | BODY MASS INDEX: 26.7 KG/M2 | OXYGEN SATURATION: 94 % | SYSTOLIC BLOOD PRESSURE: 150 MMHG | WEIGHT: 136 LBS | HEIGHT: 60 IN | HEART RATE: 76 BPM | DIASTOLIC BLOOD PRESSURE: 80 MMHG

## 2023-08-10 DIAGNOSIS — J43.2 CENTRILOBULAR EMPHYSEMA (HCC): ICD-10-CM

## 2023-08-10 DIAGNOSIS — F33.9 DEPRESSION, RECURRENT (HCC): ICD-10-CM

## 2023-08-10 DIAGNOSIS — I10 ESSENTIAL HYPERTENSION: Primary | ICD-10-CM

## 2023-08-10 DIAGNOSIS — E03.9 ACQUIRED HYPOTHYROIDISM: ICD-10-CM

## 2023-08-10 DIAGNOSIS — M81.0 OSTEOPOROSIS, UNSPECIFIED OSTEOPOROSIS TYPE, UNSPECIFIED PATHOLOGICAL FRACTURE PRESENCE: ICD-10-CM

## 2023-08-10 DIAGNOSIS — C43.9 MELANOMA OF SKIN (HCC): ICD-10-CM

## 2023-08-10 DIAGNOSIS — I73.9 PERIPHERAL VASCULAR DISEASE (HCC): ICD-10-CM

## 2023-08-10 PROCEDURE — 99214 OFFICE O/P EST MOD 30 MIN: CPT | Performed by: FAMILY MEDICINE

## 2023-08-10 RX ORDER — ESCITALOPRAM OXALATE 5 MG/1
5 TABLET ORAL DAILY
Qty: 90 TABLET | Refills: 0 | Status: SHIPPED | OUTPATIENT
Start: 2023-08-10 | End: 2023-08-16 | Stop reason: SINTOL

## 2023-08-10 RX ORDER — TIOTROPIUM BROMIDE INHALATION SPRAY 3.12 UG/1
2 SPRAY, METERED RESPIRATORY (INHALATION) DAILY
Qty: 8 G | Refills: 1 | Status: SHIPPED | OUTPATIENT
Start: 2023-08-10

## 2023-08-10 RX ORDER — LEVOTHYROXINE SODIUM 0.03 MG/1
25 TABLET ORAL DAILY
Qty: 90 TABLET | Refills: 1 | Status: SHIPPED | OUTPATIENT
Start: 2023-08-10

## 2023-08-10 RX ORDER — DOCUSATE SODIUM 100 MG/1
100 CAPSULE, LIQUID FILLED ORAL 2 TIMES DAILY
COMMUNITY

## 2023-08-10 RX ORDER — METOPROLOL SUCCINATE 100 MG/1
100 TABLET, EXTENDED RELEASE ORAL DAILY
Qty: 90 TABLET | Refills: 1 | Status: SHIPPED | OUTPATIENT
Start: 2023-08-10

## 2023-08-10 NOTE — PROGRESS NOTES
Name: Elio Mchugh      : 1935      MRN: [id number]  Encounter Provider: Mei Ahumada DO  Encounter Date: 8/10/2023   Encounter department: [address] [place] [address]   Patient is seen for follow up of chronic medical conditions. She still complains of feeling down and anxious. I discussed with her that the last time, I started her on meds for this, Duloxetine, she had dizziness, etc and her children were concerned and asked that I not prescribe anything. She says that she is able to make that decision and feels she needs medication. I prescribed a low dose of Escitalopram and we will see how she does with that. 1. Essential hypertension  -     metoprolol succinate (TOPROL-XL) 100 mg 24 hr tablet; Take 1 tablet (100 mg total) by mouth daily    2. Acquired hypothyroidism  -     levothyroxine 25 mcg tablet; Take 1 tablet (25 mcg total) by mouth daily    3. Depression, recurrent ([address])  -     escitalopram (LEXAPRO) 5 mg tablet; Take 1 tablet (5 mg total) by mouth daily    4. Osteoporosis, unspecified osteoporosis type, unspecified pathological fracture presence  -     DXA bone density spine hip and pelvis; Future; Expected date: 08/10/2023    5. Centrilobular emphysema (HCC)  -     Spiriva Respimat 2.5 MCG/ACT AERS inhaler; Inhale 2 puffs daily    6. Peripheral vascular disease ([address])    7. Melanoma of skin ([address])    I will see her back in six weeks. She should call with any problems with her new medication. Subjective      Chief Complaint   Patient presents with   • Follow-up     4 month follow up   • Anxiety     Patient reports feeling depressed, nervous, scared, forgetful   • Gas     Burping, passing gas       Patient is seen for follow up of chronic medical conditions. She is feeling a little down and anxious.  She says that she is down because of her age limiting what she can do and also what happens to friends, etc.      Review of Systems   Constitutional: Negative for chills and fever. HENT: Negative for congestion and sore throat. Respiratory: Negative for chest tightness. Cardiovascular: Negative for chest pain and palpitations. Gastrointestinal: Negative for abdominal pain, constipation, diarrhea and nausea. Belching   Genitourinary: Negative for difficulty urinating. Skin: Negative. Neurological: Negative for dizziness and headaches. Psychiatric/Behavioral: The patient is nervous/anxious. Current Outpatient Medications on File Prior to Visit   Medication Sig   • ascorbic acid (VITAMIN C) 1000 MG tablet Take 1,000 mg by mouth daily   • aspirin (ECOTRIN LOW STRENGTH) 81 mg EC tablet Take 81 mg by mouth daily   • Cholecalciferol (Vitamin D3) 25 MCG (1000 UT) CAPS Take by mouth   • docusate sodium (COLACE) 100 mg capsule Take 100 mg by mouth 2 (two) times a day   • losartan (COZAAR) 100 MG tablet Take 1 tablet by mouth once daily   • polyethylene glycol-propylene glycol (Systane) 0.4-0.3 %    • simvastatin (ZOCOR) 20 mg tablet TAKE 1 TABLET BY MOUTH ONCE DAILY AT BEDTIME       Objective     /80   Pulse 76   Temp 98.7 °F (37.1 °C)   Ht 5' 0.25" (1.53 m)   Wt 61.7 kg (136 lb)   SpO2 94%   BMI 26.34 kg/m²     Physical Exam  Vitals and nursing note reviewed. Constitutional:       General: She is not in acute distress. HENT:      Head: Normocephalic. Neck:      Thyroid: No thyromegaly. Cardiovascular:      Rate and Rhythm: Normal rate and regular rhythm. Heart sounds: Normal heart sounds. Pulmonary:      Effort: Pulmonary effort is normal.      Breath sounds: Normal breath sounds. Musculoskeletal:      Right lower leg: No edema. Left lower leg: No edema. Lymphadenopathy:      Cervical: No cervical adenopathy. Skin:     General: Skin is warm and dry. Neurological:      Mental Status: She is alert and oriented to person, place, and time.    Psychiatric:         Mood and Affect: Mood normal.       Yadira Barber DO

## 2023-08-10 NOTE — PATIENT INSTRUCTIONS
I am prescribing Escitalopram for feeling down/anxiety. I will see you back in six weeks to see how you are doing but if you have any problems, call and leave a message for me.

## 2023-08-14 ENCOUNTER — TELEPHONE (OUTPATIENT)
Dept: FAMILY MEDICINE CLINIC | Facility: CLINIC | Age: 88
End: 2023-08-14

## 2023-08-14 NOTE — TELEPHONE ENCOUNTER
"My name is Quiana Juarez. I did see doctor boards. She is set to me to call her to talk to her about my problem. I feel dizzy, tired and shaky. My telephone number is [phone]. My birthday is [birth year].  Thank you."

## 2023-08-16 ENCOUNTER — TELEPHONE (OUTPATIENT)
Dept: FAMILY MEDICINE CLINIC | Facility: CLINIC | Age: 88
End: 2023-08-16

## 2023-08-16 NOTE — TELEPHONE ENCOUNTER
"My name is Aajy Disla. I would like to be able to ask a doctor board if I could stop the last medication I was given. I'm having problems with it and my birth is [phone]. Telephone number is [phone]. I would like you to know that I cannot take that medicine ESCITALOPR AM I cannot take that medication. Thank you."    I attempted to reach pt to see what exact problems she is having with the medication but she did not answer.  I know you had spoken to her a few days ago

## 2023-08-29 ENCOUNTER — TELEPHONE (OUTPATIENT)
Dept: FAMILY MEDICINE CLINIC | Facility: CLINIC | Age: 88
End: 2023-08-29

## 2023-08-29 NOTE — TELEPHONE ENCOUNTER
"My name is Olivia Crouch. I have seen doctor boards while ago and she recommended I am to call the spine, hip and pelvis. I am very confused. I don't have the number. I do not know who to call. My telephone number is [phone]. Please call me so I could talk to you.  Thank you."    Pt given central scheduling # to schedule dexa

## 2023-09-21 ENCOUNTER — TELEPHONE (OUTPATIENT)
Dept: FAMILY MEDICINE CLINIC | Facility: CLINIC | Age: 88
End: 2023-09-21

## 2023-09-21 ENCOUNTER — OFFICE VISIT (OUTPATIENT)
Dept: FAMILY MEDICINE CLINIC | Facility: CLINIC | Age: 88
End: 2023-09-21
Payer: MEDICARE

## 2023-09-21 VITALS
OXYGEN SATURATION: 97 % | HEART RATE: 74 BPM | SYSTOLIC BLOOD PRESSURE: 128 MMHG | TEMPERATURE: 97.3 F | WEIGHT: 137 LBS | DIASTOLIC BLOOD PRESSURE: 70 MMHG | HEIGHT: 60 IN | BODY MASS INDEX: 26.9 KG/M2

## 2023-09-21 DIAGNOSIS — F41.9 ANXIETY: ICD-10-CM

## 2023-09-21 DIAGNOSIS — L98.9 SKIN LESION OF RIGHT LEG: Primary | ICD-10-CM

## 2023-09-21 DIAGNOSIS — E03.9 ACQUIRED HYPOTHYROIDISM: ICD-10-CM

## 2023-09-21 DIAGNOSIS — R25.1 TREMOR: ICD-10-CM

## 2023-09-21 PROCEDURE — 99214 OFFICE O/P EST MOD 30 MIN: CPT | Performed by: FAMILY MEDICINE

## 2023-09-21 NOTE — PATIENT INSTRUCTIONS
Call us with the name of your dermatologist.    Try Gaviscon Liquid for belching or feeling like you have to belch - two teaspoons up to four times a day as needed.

## 2023-09-21 NOTE — TELEPHONE ENCOUNTER
At [address], pt stated that Dr. Annia May was calling in a medication (2 tsp as needed) and an ointment (mupirocin). I could only confirm that the ointment was called in. Pt wanted to know if the medication would be called in as well.

## 2023-09-21 NOTE — PROGRESS NOTES
Name: Monico Johnson      : 1935      MRN: [id number]  Encounter Provider: Katy Hernandez DO  Encounter Date: 2023   Encounter department: [address] [place], [place] [address]   Patient is an 82year old female with multiple complaints today. Patient says that she was seen at an Urgent Care for an injury to her right leg - she was treated with an antibiotic. I could not find records in [facility] and patient could not tell me the name. She called the dermatologist and they could not see her until November - she will call us with the name of her dermatologist, so we can contact them. I told her that I thought it should be checked to make sure it was not a skin cancer. 1. Skin lesion of right leg  -     mupirocin (BACTROBAN) 2 % ointment; Apply BID to lesion on leg. 2. Tremor    3. Anxiety    4. Acquired hypothyroidism  -     TSH, 3rd generation with Free T4 reflex; Future; Expected date: 2023  -     Comprehensive metabolic panel; Future; Expected date: 2023  -     CBC and differential; Future; Expected date: 2023    Once again, we discussed her anxiety and tremor - we have tried a couple meds, but she seems to get dizzy or fatigued from them. She also notes frequent belching, I recommended that she try Gaviscon - I wrote it on the After Visit summary. I asked her to return in two months and have blood work for her thyroid prior         Subjective      Chief Complaint   Patient presents with   • Gas     Burping and gas issues. Started a few weeks ago    • Anxiety     Nervous and scared feeling, most of the time patient has this feeling, patient feels very shaky    • Leg Injury     Leg injury happened , right leg has rubin and discomfort    • face spot     Spot on face- left side        Patient is here to follow u on anxiety. I had prescribed medication but she had called that it caused dizziness.   Now she says she finished it.  Lesion on right leg - went to [facility].  Also belching - no pain. Has not changed diet. Doesnot drink soda - just coffee and water. Does not eat close to bedtme. Review of Systems   Constitutional: Negative for chills and fever. Gastrointestinal: Negative for abdominal pain, constipation, diarrhea, nausea and vomiting. Belching   Skin: Positive for wound. Neurological: Positive for tremors. Psychiatric/Behavioral: The patient is nervous/anxious. Current Outpatient Medications on File Prior to Visit   Medication Sig   • ascorbic acid (VITAMIN C) 1000 MG tablet Take 1,000 mg by mouth daily   • aspirin (ECOTRIN LOW STRENGTH) 81 mg EC tablet Take 81 mg by mouth daily   • Cholecalciferol (Vitamin D3) 25 MCG (1000 UT) CAPS Take by mouth   • docusate sodium (COLACE) 100 mg capsule Take 100 mg by mouth 2 (two) times a day   • levothyroxine 25 mcg tablet Take 1 tablet (25 mcg total) by mouth daily   • metoprolol succinate (TOPROL-XL) 100 mg 24 hr tablet Take 1 tablet (100 mg total) by mouth daily   • polyethylene glycol-propylene glycol (Systane) 0.4-0.3 %    • Spiriva Respimat 2.5 MCG/ACT AERS inhaler Inhale 2 puffs daily       Objective     /70 (BP Location: Left arm, Patient Position: Sitting)   Pulse 74   Temp (!) 97.3 °F (36.3 °C) (Tympanic)   Ht 5' 0.25" (1.53 m)   Wt 62.1 kg (137 lb)   SpO2 97%   BMI 26.53 kg/m²     Physical Exam  Vitals and nursing note reviewed. Constitutional:       General: She is not in acute distress. HENT:      Head: Normocephalic. Right Ear: Tympanic membrane normal.      Left Ear: Tympanic membrane normal.   Eyes:      General: No scleral icterus. Neck:      Thyroid: No thyromegaly. Cardiovascular:      Rate and Rhythm: Normal rate and regular rhythm. Heart sounds: Normal heart sounds. Pulmonary:      Effort: Pulmonary effort is normal.      Breath sounds: Normal breath sounds.    Abdominal:      General: Bowel sounds are normal. Palpations: Abdomen is soft. Tenderness: There is no abdominal tenderness. Musculoskeletal:      Right lower leg: No edema. Left lower leg: No edema. Lymphadenopathy:      Cervical: No cervical adenopathy. Skin:     General: Skin is warm and dry. Comments: Raised scaly lesion on lateral side of right lower leg. Neurological:      Mental Status: She is alert. Mental status is at baseline.        Mary Eugene DO

## 2023-09-21 NOTE — TELEPHONE ENCOUNTER
Pt advised that the medication was OTC Gaviscon. Pt advised to ask the Pharmacist to assist her in finding it.

## 2023-09-22 ENCOUNTER — TELEPHONE (OUTPATIENT)
Dept: FAMILY MEDICINE CLINIC | Facility: CLINIC | Age: 88
End: 2023-09-22

## 2023-09-25 ENCOUNTER — TELEPHONE (OUTPATIENT)
Dept: FAMILY MEDICINE CLINIC | Facility: CLINIC | Age: 88
End: 2023-09-25

## 2023-09-25 NOTE — TELEPHONE ENCOUNTER
Phone call from daughter Liliana Nye regarding recent office visit and skin lesion on leg. Patient is under the impression we told her she has skin cancer on her leg and needs to get into Dermatology ASAP. I informed her there is no indication at this point that she has skin cancer, according to Dr. Otto Bright note. She isn't able to get in with [facility] until November. Is this ok for her to wait? Can you please place an order in Epic for her? Miya's number is [phone]. She is on the communication consent.

## 2023-09-27 NOTE — TELEPHONE ENCOUNTER
Spoke with nursing staff at [address] [place] [address] to schedule appt for pt first available 10/12/23 @ 830a Dr Nadiya Sheets  Fax # [phone] will fax our ofc note, urgent care note and possibly podiatry scraping results.  I did not notify pt yet

## 2023-10-02 NOTE — TELEPHONE ENCOUNTER
Spoke with pt and her  Tori Vail about appt next week with [facility] she was already seen for her leg. Pt scheduled with [facility] Dr Erik Pennington biopsy was done. clled [facility] and cancelled appt.

## 2023-10-23 ENCOUNTER — TELEPHONE (OUTPATIENT)
Dept: FAMILY MEDICINE CLINIC | Facility: CLINIC | Age: 88
End: 2023-10-23

## 2023-10-23 NOTE — TELEPHONE ENCOUNTER
Patient would like to know whether she should stay on this medication or discontinue ~ escitalopram ? Thank you

## 2023-11-28 ENCOUNTER — OFFICE VISIT (OUTPATIENT)
Dept: FAMILY MEDICINE CLINIC | Facility: CLINIC | Age: 88
End: 2023-11-28
Payer: MEDICARE

## 2023-11-28 VITALS
HEART RATE: 84 BPM | OXYGEN SATURATION: 98 % | HEIGHT: 61 IN | SYSTOLIC BLOOD PRESSURE: 148 MMHG | WEIGHT: 139.4 LBS | BODY MASS INDEX: 26.32 KG/M2 | DIASTOLIC BLOOD PRESSURE: 72 MMHG | TEMPERATURE: 97.5 F

## 2023-11-28 DIAGNOSIS — Z23 ENCOUNTER FOR IMMUNIZATION: ICD-10-CM

## 2023-11-28 DIAGNOSIS — R73.01 IMPAIRED FASTING GLUCOSE: ICD-10-CM

## 2023-11-28 DIAGNOSIS — E78.2 MIXED HYPERLIPIDEMIA: ICD-10-CM

## 2023-11-28 DIAGNOSIS — E03.9 ACQUIRED HYPOTHYROIDISM: Primary | ICD-10-CM

## 2023-11-28 PROCEDURE — 90662 IIV NO PRSV INCREASED AG IM: CPT

## 2023-11-28 PROCEDURE — G0008 ADMIN INFLUENZA VIRUS VAC: HCPCS

## 2023-11-28 PROCEDURE — 99214 OFFICE O/P EST MOD 30 MIN: CPT | Performed by: FAMILY MEDICINE

## 2023-11-28 NOTE — PROGRESS NOTES
Name: Katy Gao      : 1935      MRN: [id number]  Encounter Provider: Lea Howard DO  Encounter Date: 2023   Encounter department: [facility]    Assessment & Plan   Patient is a 8-year-old female seen for follow-up of chronic medical conditions.  1. Acquired hypothyroidism  -     Lipid Panel with Direct LDL reflex; Future; Expected date: 2023  -     Comprehensive metabolic panel; Future; Expected date: 2023  -     TSH, 3rd generation with Free T4 reflex; Future; Expected date: 2023  -     CBC and differential; Future; Expected date: 2023    2. Encounter for immunization  -     influenza vaccine, high-dose, PF 0.7 mL (FLUZONE HIGH-DOSE)    3. Mixed hyperlipidemia  -     Lipid Panel with Direct LDL reflex; Future; Expected date: 2023  -     Comprehensive metabolic panel; Future; Expected date: 2023  -     CBC and differential; Future; Expected date: 2023    4. Impaired fasting glucose  -     Comprehensive metabolic panel; Future; Expected date: 2023  -     HEMOGLOBIN A1C W/ EAG ESTIMATION; Future; Expected date: 2023    We did discuss issues with her nervousness.  At times feels short of breath or tremulous.  We have tried medication in the past but she has had side effects.  Due for Medicare wellness in April       Subjective      Chief Complaint   Patient presents with   • Follow-up       Patient is seen for follow up of chronic medical conditions.   She has complaints of shortness of breath, fatigue, achiness and shakiness - she frequently has these complaints      Review of Systems   Constitutional:  Negative for chills and fever.   HENT:  Negative for congestion and sore throat.    Respiratory:  Negative for chest tightness.    Cardiovascular:  Negative for chest pain and palpitations.   Gastrointestinal:  Negative for abdominal pain, constipation, diarrhea and nausea.   Genitourinary:  Negative for difficulty  "urinating.   Musculoskeletal:  Positive for neck pain.   Skin: Negative.    Neurological:  Negative for dizziness and headaches.   Psychiatric/Behavioral: Negative.         Current Outpatient Medications on File Prior to Visit   Medication Sig   • ascorbic acid (VITAMIN C) 1000 MG tablet Take 1,000 mg by mouth daily   • aspirin (ECOTRIN LOW STRENGTH) 81 mg EC tablet Take 81 mg by mouth daily   • Cholecalciferol (Vitamin D3) 25 MCG (1000 UT) CAPS Take by mouth   • docusate sodium (COLACE) 100 mg capsule Take 100 mg by mouth 2 (two) times a day   • levothyroxine 25 mcg tablet Take 1 tablet (25 mcg total) by mouth daily   • losartan (COZAAR) 100 MG tablet Take 1 tablet by mouth once daily   • metoprolol succinate (TOPROL-XL) 100 mg 24 hr tablet Take 1 tablet (100 mg total) by mouth daily   • mupirocin (BACTROBAN) 2 % ointment Apply BID to lesion on leg.   • polyethylene glycol-propylene glycol (Systane) 0.4-0.3 %    • simvastatin (ZOCOR) 20 mg tablet TAKE 1 TABLET BY MOUTH ONCE DAILY AT BEDTIME   • Spiriva Respimat 2.5 MCG/ACT AERS inhaler Inhale 2 puffs daily       Objective     /72 (BP Location: Left arm, Patient Position: Sitting, Cuff Size: Adult)   Pulse 84   Temp 97.5 °F (36.4 °C)   Ht 5' 1\" (1.549 m)   Wt 63.2 kg (139 lb 6.4 oz)   SpO2 98%   BMI 26.34 kg/m²     Physical Exam  Vitals and nursing note reviewed.   Constitutional:       General: She is not in acute distress.  HENT:      Head: Normocephalic.   Neck:      Thyroid: No thyromegaly.   Cardiovascular:      Rate and Rhythm: Normal rate and regular rhythm.      Heart sounds: Normal heart sounds.   Pulmonary:      Effort: Pulmonary effort is normal.      Breath sounds: Normal breath sounds.   Musculoskeletal:      Right lower leg: No edema.      Left lower leg: No edema.   Lymphadenopathy:      Cervical: No cervical adenopathy.   Skin:     General: Skin is warm and dry.   Neurological:      Mental Status: She is alert and oriented to person, " place, and time.   Psychiatric:         Mood and Affect: Mood normal.       Lea Howard, DO

## 2023-11-28 NOTE — PATIENT INSTRUCTIONS
Come back to the lab for fasting blood work - drink water or black coffee or tea - can just walk in. Lab opens at 7 AM Monday through Friday.  Try Gaviscon for burping, passing gas. Is over the counter.  Call me with the name of the ointment you are using on your neck. If it is for inflammation, it is probably ok.

## 2023-11-29 ENCOUNTER — TELEPHONE (OUTPATIENT)
Dept: FAMILY MEDICINE CLINIC | Facility: CLINIC | Age: 88
End: 2023-11-29

## 2023-11-29 NOTE — TELEPHONE ENCOUNTER
My name is Jostin Peña. My birthday is [id number]. I would like to leave a message for Doctor Alberto Mathews. The medication I'm taking is Bio Freeze. Thank you.  Bye, bye.

## 2023-12-05 ENCOUNTER — TELEPHONE (OUTPATIENT)
Dept: FAMILY MEDICINE CLINIC | Facility: CLINIC | Age: 88
End: 2023-12-05

## 2023-12-05 NOTE — TELEPHONE ENCOUNTER
My name is Daphnie Cueva. I, Doctor Johnny Tapia recommended me to take medication named Gaviscon GAVISCONI. Do not know the amount I should take a day. Would you please have her call Daphnie Cueva and [phone]? Thank you. Pt instructed to follow directions on the bottle. She states there are none. She bought the Galviscon liquid. Pt given directions of 2-4 tsp 4 times daily (after meals and at bedtime).

## 2024-01-06 DIAGNOSIS — J43.2 CENTRILOBULAR EMPHYSEMA (HCC): ICD-10-CM

## 2024-01-08 RX ORDER — TIOTROPIUM BROMIDE INHALATION SPRAY 3.12 UG/1
2 SPRAY, METERED RESPIRATORY (INHALATION) DAILY
Qty: 8 G | Refills: 1 | Status: SHIPPED | OUTPATIENT
Start: 2024-01-08

## 2024-02-07 ENCOUNTER — APPOINTMENT (OUTPATIENT)
Dept: LAB | Facility: CLINIC | Age: 89
End: 2024-02-07
Payer: MEDICARE

## 2024-02-07 DIAGNOSIS — R73.01 IMPAIRED FASTING GLUCOSE: ICD-10-CM

## 2024-02-07 DIAGNOSIS — E78.2 MIXED HYPERLIPIDEMIA: ICD-10-CM

## 2024-02-07 DIAGNOSIS — E03.9 ACQUIRED HYPOTHYROIDISM: ICD-10-CM

## 2024-02-08 ENCOUNTER — APPOINTMENT (OUTPATIENT)
Dept: LAB | Facility: CLINIC | Age: 89
End: 2024-02-08
Payer: MEDICARE

## 2024-02-08 LAB
BASOPHILS # BLD AUTO: 0.04 THOUSANDS/ÂΜL (ref 0–0.1)
BASOPHILS NFR BLD AUTO: 1 % (ref 0–1)
EOSINOPHIL # BLD AUTO: 0.3 THOUSAND/ÂΜL (ref 0–0.61)
EOSINOPHIL NFR BLD AUTO: 5 % (ref 0–6)
ERYTHROCYTE [DISTWIDTH] IN BLOOD BY AUTOMATED COUNT: 13.6 % (ref 11.6–15.1)
EST. AVERAGE GLUCOSE BLD GHB EST-MCNC: 123 MG/DL
HBA1C MFR BLD: 5.9 %
HCT VFR BLD AUTO: 44.9 % (ref 34.8–46.1)
HGB BLD-MCNC: 14.7 G/DL (ref 11.5–15.4)
IMM GRANULOCYTES # BLD AUTO: 0.03 THOUSAND/UL (ref 0–0.2)
IMM GRANULOCYTES NFR BLD AUTO: 1 % (ref 0–2)
LYMPHOCYTES # BLD AUTO: 1.79 THOUSANDS/ÂΜL (ref 0.6–4.47)
LYMPHOCYTES NFR BLD AUTO: 29 % (ref 14–44)
MCH RBC QN AUTO: 29.9 PG (ref 26.8–34.3)
MCHC RBC AUTO-ENTMCNC: 32.7 G/DL (ref 31.4–37.4)
MCV RBC AUTO: 91 FL (ref 82–98)
MONOCYTES # BLD AUTO: 0.7 THOUSAND/ÂΜL (ref 0.17–1.22)
MONOCYTES NFR BLD AUTO: 11 % (ref 4–12)
NEUTROPHILS # BLD AUTO: 3.36 THOUSANDS/ÂΜL (ref 1.85–7.62)
NEUTS SEG NFR BLD AUTO: 53 % (ref 43–75)
NRBC BLD AUTO-RTO: 0 /100 WBCS
PLATELET # BLD AUTO: 200 THOUSANDS/UL (ref 149–390)
PMV BLD AUTO: 12.5 FL (ref 8.9–12.7)
RBC # BLD AUTO: 4.91 MILLION/UL (ref 3.81–5.12)
TSH SERPL DL<=0.05 MIU/L-ACNC: 2.96 UIU/ML (ref 0.45–4.5)
WBC # BLD AUTO: 6.22 THOUSAND/UL (ref 4.31–10.16)

## 2024-02-08 PROCEDURE — 83036 HEMOGLOBIN GLYCOSYLATED A1C: CPT

## 2024-02-08 PROCEDURE — 80053 COMPREHEN METABOLIC PANEL: CPT

## 2024-02-08 PROCEDURE — 80061 LIPID PANEL: CPT

## 2024-02-08 PROCEDURE — 85025 COMPLETE CBC W/AUTO DIFF WBC: CPT

## 2024-02-08 PROCEDURE — 84443 ASSAY THYROID STIM HORMONE: CPT

## 2024-02-08 PROCEDURE — 36415 COLL VENOUS BLD VENIPUNCTURE: CPT

## 2024-02-09 LAB
ALBUMIN SERPL BCP-MCNC: 3.9 G/DL (ref 3.5–5)
ALP SERPL-CCNC: 39 U/L (ref 34–104)
ALT SERPL W P-5'-P-CCNC: 13 U/L (ref 7–52)
ANION GAP SERPL CALCULATED.3IONS-SCNC: 12 MMOL/L
AST SERPL W P-5'-P-CCNC: 20 U/L (ref 13–39)
BILIRUB SERPL-MCNC: 0.83 MG/DL (ref 0.2–1)
BUN SERPL-MCNC: 14 MG/DL (ref 5–25)
CALCIUM SERPL-MCNC: 9.5 MG/DL (ref 8.4–10.2)
CHLORIDE SERPL-SCNC: 101 MMOL/L (ref 96–108)
CHOLEST SERPL-MCNC: 138 MG/DL
CO2 SERPL-SCNC: 26 MMOL/L (ref 21–32)
CREAT SERPL-MCNC: 0.81 MG/DL (ref 0.6–1.3)
GFR SERPL CREATININE-BSD FRML MDRD: 65 ML/MIN/1.73SQ M
GLUCOSE P FAST SERPL-MCNC: 103 MG/DL (ref 65–99)
HDLC SERPL-MCNC: 61 MG/DL
LDLC SERPL CALC-MCNC: 61 MG/DL (ref 0–100)
POTASSIUM SERPL-SCNC: 4.4 MMOL/L (ref 3.5–5.3)
PROT SERPL-MCNC: 6.4 G/DL (ref 6.4–8.4)
SODIUM SERPL-SCNC: 139 MMOL/L (ref 135–147)
TRIGL SERPL-MCNC: 81 MG/DL

## 2024-02-21 PROBLEM — R05.9 COUGH: Status: RESOLVED | Noted: 2018-01-26 | Resolved: 2024-02-21

## 2024-03-04 DIAGNOSIS — I10 ESSENTIAL HYPERTENSION: ICD-10-CM

## 2024-03-04 DIAGNOSIS — E03.9 ACQUIRED HYPOTHYROIDISM: ICD-10-CM

## 2024-03-04 RX ORDER — LEVOTHYROXINE SODIUM 0.03 MG/1
25 TABLET ORAL DAILY
Qty: 90 TABLET | Refills: 0 | Status: SHIPPED | OUTPATIENT
Start: 2024-03-04

## 2024-03-04 RX ORDER — METOPROLOL SUCCINATE 100 MG/1
100 TABLET, EXTENDED RELEASE ORAL DAILY
Qty: 90 TABLET | Refills: 0 | Status: SHIPPED | OUTPATIENT
Start: 2024-03-04

## 2024-03-17 DIAGNOSIS — E78.2 MIXED HYPERLIPIDEMIA: ICD-10-CM

## 2024-03-17 RX ORDER — SIMVASTATIN 20 MG
TABLET ORAL
Qty: 90 TABLET | Refills: 1 | Status: SHIPPED | OUTPATIENT
Start: 2024-03-17

## 2024-04-10 ENCOUNTER — RA CDI HCC (OUTPATIENT)
Dept: OTHER | Facility: HOSPITAL | Age: 89
End: 2024-04-10

## 2024-04-28 DIAGNOSIS — I10 ESSENTIAL HYPERTENSION: ICD-10-CM

## 2024-04-28 RX ORDER — LOSARTAN POTASSIUM 100 MG/1
TABLET ORAL
Qty: 90 TABLET | Refills: 1 | Status: SHIPPED | OUTPATIENT
Start: 2024-04-28

## 2024-05-20 ENCOUNTER — OFFICE VISIT (OUTPATIENT)
Dept: FAMILY MEDICINE CLINIC | Facility: CLINIC | Age: 89
End: 2024-05-20
Payer: MEDICARE

## 2024-05-20 VITALS
HEART RATE: 86 BPM | SYSTOLIC BLOOD PRESSURE: 142 MMHG | OXYGEN SATURATION: 95 % | WEIGHT: 138 LBS | TEMPERATURE: 98.9 F | BODY MASS INDEX: 26.07 KG/M2 | DIASTOLIC BLOOD PRESSURE: 72 MMHG

## 2024-05-20 DIAGNOSIS — S81.811A LACERATION OF RIGHT LOWER LEG, INITIAL ENCOUNTER: Primary | ICD-10-CM

## 2024-05-20 PROCEDURE — 99214 OFFICE O/P EST MOD 30 MIN: CPT

## 2024-05-20 PROCEDURE — 12002 RPR S/N/AX/GEN/TRNK2.6-7.5CM: CPT

## 2024-05-20 PROCEDURE — 12020 TX SUPFC WND DEHSN SMPL CLSR: CPT

## 2024-05-20 NOTE — ASSESSMENT & PLAN NOTE
Patient with laceration of right lower leg, repaired today with Steri Strips as skin is too thin for suture placement. Wound wrapped with gauze, recommended switching gauze out daily x 5 days, and then letting wound open to air thereafter. Steri Strips will naturally fall off on their own. Recommend letting us know if any drainage or pain occurs in the laceration. Patient UTD with tetanus. Discussed healing can take up to a month to have a full scab present. Recommended not picking at the wound to prevent scab falling off again. Patient agreeable with this plan.

## 2024-05-20 NOTE — PROGRESS NOTES
Ambulatory Visit  Name: Katy Gao      : 1935      MRN: [id number]  Encounter Provider: Kacie Gil PA-C  Encounter Date: 2024   Encounter department: [facility]    Assessment & Plan   1. Laceration of right lower leg, initial encounter  Assessment & Plan:  Patient with laceration of right lower leg, repaired today with Steri Strips as skin is too thin for suture placement. Wound wrapped with gauze, recommended switching gauze out daily x 5 days, and then letting wound open to air thereafter. Steri Strips will naturally fall off on their own. Recommend letting us know if any drainage or pain occurs in the laceration. Patient UTD with tetanus. Discussed healing can take up to a month to have a full scab present. Recommended not picking at the wound to prevent scab falling off again. Patient agreeable with this plan.   Orders:  -     Laceration repair       History of Present Illness     Patient presents today for evaluation of a cut on her right lower leg that was repaired about two weeks ago at the ER. She states she was picking at it today and it started bleeding again. No pain. Bleeding has now stopped, bleeding was for a total of 5 minutes.     Laceration   The incident occurred more than 1 week ago. The laceration is located on the Right leg. The laceration is 4 cm in size. The pain is at a severity of 0/10. The patient is experiencing no pain. Her tetanus status is UTD.       Review of Systems   Skin:  Positive for wound (right lower anterior leg).       Objective     /72 (BP Location: Right arm, Patient Position: Sitting, Cuff Size: Standard)   Pulse 86   Temp 98.9 °F (37.2 °C) (Temporal)   Wt 62.6 kg (138 lb)   SpO2 95%   BMI 26.07 kg/m²     Physical Exam  Skin:            Comments: Approximately 4cm laceration with thin skin. No current bleeding or drainage.       Universal Protocol:  Consent: Verbal consent obtained.  Risks and benefits: risks,  benefits and alternatives were discussed  Consent given by: patient  Patient understanding: patient states understanding of the procedure being performed  Patient consent: the patient's understanding of the procedure matches consent given  Procedure consent: procedure consent matches procedure scheduled  Patient identity confirmed: verbally with patient  Laceration repair    Date/Time: 5/20/2024 3:40 PM    Performed by: Kacie Gil PA-C  Authorized by: Kacie Gil PA-C  Body area: lower extremity  Location details: right lower leg  Laceration length: 4 cm  Foreign bodies: no foreign bodies  Tendon involvement: none  Nerve involvement: none  Vascular damage: no    Wound Dehiscence:  Superficial Wound Dehiscence: simple closure      Procedure Details:  Preparation: Patient was prepped and draped in the usual sterile fashion.  Irrigation solution: saline  Amount of cleaning: standard  Debridement: none  Wound skin closure material used: Steri Strips.  Approximation: loose  Approximation difficulty: simple  Dressing: antibiotic ointment, gauze roll and non-adhesive packing strip  Patient tolerance: patient tolerated the procedure well with no immediate complications  Comments: Skin too thin to consider sutures. Steri Strips placed over the wound, 6 steristrips placed in succession. Nonadherent gauze pad and gauze placed over the wound for protection.         Administrative Statements     Kacie Gil PA-C  [facility]

## 2024-05-22 DIAGNOSIS — J43.2 CENTRILOBULAR EMPHYSEMA (HCC): ICD-10-CM

## 2024-05-22 RX ORDER — TIOTROPIUM BROMIDE INHALATION SPRAY 3.12 UG/1
2 SPRAY, METERED RESPIRATORY (INHALATION) DAILY
Qty: 8 G | Refills: 3 | Status: SHIPPED | OUTPATIENT
Start: 2024-05-22

## 2024-05-29 DIAGNOSIS — E03.9 ACQUIRED HYPOTHYROIDISM: ICD-10-CM

## 2024-05-30 RX ORDER — LEVOTHYROXINE SODIUM 0.03 MG/1
25 TABLET ORAL DAILY
Qty: 90 TABLET | Refills: 1 | Status: SHIPPED | OUTPATIENT
Start: 2024-05-30

## 2024-06-03 DIAGNOSIS — I10 ESSENTIAL HYPERTENSION: ICD-10-CM

## 2024-06-04 RX ORDER — METOPROLOL SUCCINATE 100 MG/1
100 TABLET, EXTENDED RELEASE ORAL DAILY
Qty: 90 TABLET | Refills: 1 | Status: SHIPPED | OUTPATIENT
Start: 2024-06-04

## 2024-06-06 ENCOUNTER — OFFICE VISIT (OUTPATIENT)
Dept: FAMILY MEDICINE CLINIC | Facility: CLINIC | Age: 89
End: 2024-06-06
Payer: MEDICARE

## 2024-06-06 VITALS
TEMPERATURE: 97.6 F | OXYGEN SATURATION: 93 % | HEART RATE: 78 BPM | DIASTOLIC BLOOD PRESSURE: 68 MMHG | WEIGHT: 135 LBS | BODY MASS INDEX: 25.51 KG/M2 | SYSTOLIC BLOOD PRESSURE: 136 MMHG

## 2024-06-06 DIAGNOSIS — C43.9 MELANOMA OF SKIN (HCC): ICD-10-CM

## 2024-06-06 DIAGNOSIS — H00.012 HORDEOLUM EXTERNUM OF RIGHT LOWER EYELID: ICD-10-CM

## 2024-06-06 DIAGNOSIS — F33.9 DEPRESSION, RECURRENT (HCC): ICD-10-CM

## 2024-06-06 DIAGNOSIS — Z00.00 MEDICARE ANNUAL WELLNESS VISIT, SUBSEQUENT: Primary | ICD-10-CM

## 2024-06-06 DIAGNOSIS — I73.9 PERIPHERAL VASCULAR DISEASE (HCC): ICD-10-CM

## 2024-06-06 DIAGNOSIS — N39.46 MIXED STRESS AND URGE URINARY INCONTINENCE: ICD-10-CM

## 2024-06-06 DIAGNOSIS — E03.9 ACQUIRED HYPOTHYROIDISM: ICD-10-CM

## 2024-06-06 DIAGNOSIS — R73.01 IMPAIRED FASTING GLUCOSE: ICD-10-CM

## 2024-06-06 DIAGNOSIS — I10 ESSENTIAL HYPERTENSION: ICD-10-CM

## 2024-06-06 DIAGNOSIS — J43.2 CENTRILOBULAR EMPHYSEMA (HCC): ICD-10-CM

## 2024-06-06 DIAGNOSIS — E78.2 MIXED HYPERLIPIDEMIA: ICD-10-CM

## 2024-06-06 PROCEDURE — 99214 OFFICE O/P EST MOD 30 MIN: CPT | Performed by: FAMILY MEDICINE

## 2024-06-06 PROCEDURE — G0439 PPPS, SUBSEQ VISIT: HCPCS | Performed by: FAMILY MEDICINE

## 2024-06-06 RX ORDER — DOXYCYCLINE HYCLATE 100 MG/1
100 CAPSULE ORAL EVERY 12 HOURS SCHEDULED
Qty: 14 CAPSULE | Refills: 0 | Status: SHIPPED | OUTPATIENT
Start: 2024-06-06 | End: 2024-06-13

## 2024-06-06 RX ORDER — SULFACETAMIDE SODIUM 100 MG/ML
1 SOLUTION/ DROPS OPHTHALMIC 4 TIMES DAILY
Qty: 5 ML | Refills: 0 | Status: SHIPPED | OUTPATIENT
Start: 2024-06-06

## 2024-06-06 NOTE — PATIENT INSTRUCTIONS
Medicare Preventive Visit Patient Instructions  Thank you for completing your Welcome to Medicare Visit or Medicare Annual Wellness Visit today. Your next wellness visit will be due in one year (6/7/2025).  The screening/preventive services that you may require over the next 5-10 years are detailed below. Some tests may not apply to you based off risk factors and/or age. Screening tests ordered at today's visit but not completed yet may show as past due. Also, please note that scanned in results may not display below.  Preventive Screenings:  Service Recommendations Previous Testing/Comments   Colorectal Cancer Screening  * Colonoscopy    * Fecal Occult Blood Test (FOBT)/Fecal Immunochemical Test (FIT)  * Fecal DNA/Cologuard Test  * Flexible Sigmoidoscopy Age: 45-75 years old   Colonoscopy: every 10 years (may be performed more frequently if at higher risk)  OR  FOBT/FIT: every 1 year  OR  Cologuard: every 3 years  OR  Sigmoidoscopy: every 5 years  Screening may be recommended earlier than age 45 if at higher risk for colorectal cancer. Also, an individualized decision between you and your healthcare provider will decide whether screening between the ages of 76-85 would be appropriate. Colonoscopy: Not on file  FOBT/FIT: Not on file  Cologuard: Not on file  Sigmoidoscopy: Not on file    Screening Not Indicated     Breast Cancer Screening Age: 40+ years old  Frequency: every 1-2 years  Not required if history of left and right mastectomy Mammogram: 11/09/2015        Cervical Cancer Screening Between the ages of 21-29, pap smear recommended once every 3 years.   Between the ages of 30-65, can perform pap smear with HPV co-testing every 5 years.   Recommendations may differ for women with a history of total hysterectomy, cervical cancer, or abnormal pap smears in past. Pap Smear: Not on file    Screening Not Indicated   Hepatitis C Screening Once for adults born between 1945 and 1965  More frequently in patients at  high risk for Hepatitis C Hep C Antibody: Not on file        Diabetes Screening 1-2 times per year if you're at risk for diabetes or have pre-diabetes Fasting glucose: 103 mg/dL (2/8/2024)  A1C: 5.9 % (2/8/2024)  Screening Current   Cholesterol Screening Once every 5 years if you don't have a lipid disorder. May order more often based on risk factors. Lipid panel: 02/08/2024    Screening Not Indicated  History Lipid Disorder     Other Preventive Screenings Covered by Medicare:  Abdominal Aortic Aneurysm (AAA) Screening: covered once if your at risk. You're considered to be at risk if you have a family history of AAA.  Lung Cancer Screening: covers low dose CT scan once per year if you meet all of the following conditions: (1) Age 55-77; (2) No signs or symptoms of lung cancer; (3) Current smoker or have quit smoking within the last 15 years; (4) You have a tobacco smoking history of at least 20 pack years (packs per day multiplied by number of years you smoked); (5) You get a written order from a healthcare provider.  Glaucoma Screening: covered annually if you're considered high risk: (1) You have diabetes OR (2) Family history of glaucoma OR (3)  aged 50 and older OR (4)  American aged 65 and older  Osteoporosis Screening: covered every 2 years if you meet one of the following conditions: (1) You're estrogen deficient and at risk for osteoporosis based off medical history and other findings; (2) Have a vertebral abnormality; (3) On glucocorticoid therapy for more than 3 months; (4) Have primary hyperparathyroidism; (5) On osteoporosis medications and need to assess response to drug therapy.   Last bone density test (DXA Scan): 07/15/2021.  HIV Screening: covered annually if you're between the age of 15-65. Also covered annually if you are younger than 15 and older than 65 with risk factors for HIV infection. For pregnant patients, it is covered up to 3 times per  pregnancy.    Immunizations:  Immunization Recommendations   Influenza Vaccine Annual influenza vaccination during flu season is recommended for all persons aged >= 6 months who do not have contraindications   Pneumococcal Vaccine   * Pneumococcal conjugate vaccine = PCV13 (Prevnar 13), PCV15 (Vaxneuvance), PCV20 (Prevnar 20)  * Pneumococcal polysaccharide vaccine = PPSV23 (Pneumovax) Adults 19-65 yo with certain risk factors or if 65+ yo  If never received any pneumonia vaccine: recommend Prevnar 20 (PCV20)  Give PCV20 if previously received 1 dose of PCV13 or PPSV23   Hepatitis B Vaccine 3 dose series if at intermediate or high risk (ex: diabetes, end stage renal disease, liver disease)   Respiratory syncytial virus (RSV) Vaccine - COVERED BY MEDICARE PART D  * RSVPreF3 (Arexvy) CDC recommends that adults 60 years of age and older may receive a single dose of RSV vaccine using shared clinical decision-making (SCDM)   Tetanus (Td) Vaccine - COST NOT COVERED BY MEDICARE PART B Following completion of primary series, a booster dose should be given every 10 years to maintain immunity against tetanus. Td may also be given as tetanus wound prophylaxis.   Tdap Vaccine - COST NOT COVERED BY MEDICARE PART B Recommended at least once for all adults. For pregnant patients, recommended with each pregnancy.   Shingles Vaccine (Shingrix) - COST NOT COVERED BY MEDICARE PART B  2 shot series recommended in those 19 years and older who have or will have weakened immune systems or those 50 years and older     Health Maintenance Due:      Topic Date Due   • DXA SCAN  07/15/2023     Immunizations Due:      Topic Date Due   • COVID-19 Vaccine (4 - 2023-24 season) 09/01/2023     Advance Directives   What are advance directives?  Advance directives are legal documents that state your wishes and plans for medical care. These plans are made ahead of time in case you lose your ability to make decisions for yourself. Advance directives can  apply to any medical decision, such as the treatments you want, and if you want to donate organs.   What are the types of advance directives?  There are many types of advance directives, and each state has rules about how to use them. You may choose a combination of any of the following:  Living will:  This is a written record of the treatment you want. You can also choose which treatments you do not want, which to limit, and which to stop at a certain time. This includes surgery, medicine, IV fluid, and tube feedings.   Durable power of  for healthcare (DPAHC):  This is a written record that states who you want to make healthcare choices for you when you are unable to make them for yourself. This person, called a proxy, is usually a family member or a friend. You may choose more than 1 proxy.  Do not resuscitate (DNR) order:  A DNR order is used in case your heart stops beating or you stop breathing. It is a request not to have certain forms of treatment, such as CPR. A DNR order may be included in other types of advance directives.  Medical directive:  This covers the care that you want if you are in a coma, near death, or unable to make decisions for yourself. You can list the treatments you want for each condition. Treatment may include pain medicine, surgery, blood transfusions, dialysis, IV or tube feedings, and a ventilator (breathing machine).  Values history:  This document has questions about your views, beliefs, and how you feel and think about life. This information can help others choose the care that you would choose.  Why are advance directives important?  An advance directive helps you control your care. Although spoken wishes may be used, it is better to have your wishes written down. Spoken wishes can be misunderstood, or not followed. Treatments may be given even if you do not want them. An advance directive may make it easier for your family to make difficult choices about your care.    Urinary Incontinence   Urinary incontinence (UI)  is when you lose control of your bladder. UI develops because your bladder cannot store or empty urine properly. The 3 most common types of UI are stress incontinence, urge incontinence, or both.  Medicines:   May be given to help strengthen your bladder control. Report any side effects of medication to your healthcare provider.  Do pelvic muscle exercises often:  Your pelvic muscles help you stop urinating. Squeeze these muscles tight for 5 seconds, then relax for 5 seconds. Gradually work up to squeezing for 10 seconds. Do 3 sets of 15 repetitions a day, or as directed. This will help strengthen your pelvic muscles and improve bladder control.  Train your bladder:  Go to the bathroom at set times, such as every 2 hours, even if you do not feel the urge to go. You can also try to hold your urine when you feel the urge to go. For example, hold your urine for 5 minutes when you feel the urge to go. As that becomes easier, hold your urine for 10 minutes.   Self-care:   Keep a UI record.  Write down how often you leak urine and how much you leak. Make a note of what you were doing when you leaked urine.  Drink liquids as directed. You may need to limit the amount of liquid you drink to help control your urine leakage. Do not drink any liquid right before you go to bed. Limit or do not have drinks that contain caffeine or alcohol.   Prevent constipation.  Eat a variety of high-fiber foods. Good examples are high-fiber cereals, beans, vegetables, and whole-grain breads. Walking is the best way to trigger your intestines to have a bowel movement.  Exercise regularly and maintain a healthy weight.  Weight loss and exercise will decrease pressure on your bladder and help you control your leakage.   Use a catheter as directed  to help empty your bladder. A catheter is a tiny, plastic tube that is put into your bladder to drain your urine.   Go to behavior therapy as  directed.  Behavior therapy may be used to help you learn to control your urge to urinate.    Weight Management   Why it is important to manage your weight:  Being overweight increases your risk of health conditions such as heart disease, high blood pressure, type 2 diabetes, and certain types of cancer. It can also increase your risk for osteoarthritis, sleep apnea, and other respiratory problems. Aim for a slow, steady weight loss. Even a small amount of weight loss can lower your risk of health problems.  How to lose weight safely:  A safe and healthy way to lose weight is to eat fewer calories and get regular exercise. You can lose up about 1 pound a week by decreasing the number of calories you eat by 500 calories each day.   Healthy meal plan for weight management:  A healthy meal plan includes a variety of foods, contains fewer calories, and helps you stay healthy. A healthy meal plan includes the following:  Eat whole-grain foods more often.  A healthy meal plan should contain fiber. Fiber is the part of grains, fruits, and vegetables that is not broken down by your body. Whole-grain foods are healthy and provide extra fiber in your diet. Some examples of whole-grain foods are whole-wheat breads and pastas, oatmeal, brown rice, and bulgur.  Eat a variety of vegetables every day.  Include dark, leafy greens such as spinach, kale, nellie greens, and mustard greens. Eat yellow and orange vegetables such as carrots, sweet potatoes, and winter squash.   Eat a variety of fruits every day.  Choose fresh or canned fruit (canned in its own juice or light syrup) instead of juice. Fruit juice has very little or no fiber.  Eat low-fat dairy foods.  Drink fat-free (skim) milk or 1% milk. Eat fat-free yogurt and low-fat cottage cheese. Try low-fat cheeses such as mozzarella and other reduced-fat cheeses.  Choose meat and other protein foods that are low in fat.  Choose beans or other legumes such as split peas or  lentils. Choose fish, skinless poultry (chicken or turkey), or lean cuts of red meat (beef or pork). Before you cook meat or poultry, cut off any visible fat.   Use less fat and oil.  Try baking foods instead of frying them. Add less fat, such as margarine, sour cream, regular salad dressing and mayonnaise to foods. Eat fewer high-fat foods. Some examples of high-fat foods include french fries, doughnuts, ice cream, and cakes.  Eat fewer sweets.  Limit foods and drinks that are high in sugar. This includes candy, cookies, regular soda, and sweetened drinks.  Exercise:  Exercise at least 30 minutes per day on most days of the week. Some examples of exercise include walking, biking, dancing, and swimming. You can also fit in more physical activity by taking the stairs instead of the elevator or parking farther away from stores. Ask your healthcare provider about the best exercise plan for you.      © Copyright [employer] 2018 Information is for End User's use only and may not be sold, redistributed or otherwise used for commercial purposes. All illustrations and images included in CareNotes® are the copyrighted property of [employer] or [employer]

## 2024-06-06 NOTE — PROGRESS NOTES
Ambulatory Visit  Name: Katy Gao      : 1935      MRN: [id number]  Encounter Provider: Lea Howard DO  Encounter Date: 2024   Encounter department: [facility]    Assessment & Plan   1. Medicare annual wellness visit, subsequent  2. Hordeolum externum of right lower eyelid  -     doxycycline hyclate (VIBRAMYCIN) 100 mg capsule; Take 1 capsule (100 mg total) by mouth every 12 (twelve) hours for 7 days  -     sulfacetamide (BLEPH-10) 10 % ophthalmic solution; Administer 1 drop to the right eye 4 (four) times a day  3. Centrilobular emphysema (HCC)  4. Depression, recurrent (HCC)  5. Peripheral vascular disease (HCC)  6. Melanoma of skin (HCC)  7. Essential hypertension  -     Comprehensive metabolic panel; Future; Expected date: 2024  -     TSH, 3rd generation with Free T4 reflex; Future; Expected date: 2024  -     CBC and differential; Future; Expected date: 2024  8. Acquired hypothyroidism  -     Lipid Panel with Direct LDL reflex; Future; Expected date: 2024  -     Comprehensive metabolic panel; Future; Expected date: 2024  -     TSH, 3rd generation with Free T4 reflex; Future; Expected date: 2024  -     CBC and differential; Future; Expected date: 2024  9. Impaired fasting glucose  -     Comprehensive metabolic panel; Future; Expected date: 2024  -     Hemoglobin A1C; Future; Expected date: 2024  10. Mixed hyperlipidemia  -     Lipid Panel with Direct LDL reflex; Future; Expected date: 2024  -     Comprehensive metabolic panel; Future; Expected date: 2024  11. Mixed stress and urge urinary incontinence       Patient is seen for Medicare wellness and also her chronic medical conditions.  She was given blood work to do prior to her next visit in September.  We did discuss her concerns about tremors and anxiety.  This is a continuing complaint for patient that is always, I reminded her that we have tried a couple  medications and she has side effects to them.  I did offer to refer her to neurology regarding the tremor but she does not feel it is necessary.  She did also have a stye of the right lower eyelid and I treated with an antibiotic.    Preventive health issues were discussed with patient, and age appropriate screening tests were ordered as noted in patient's After Visit Summary. Personalized health advice and appropriate referrals for health education or preventive services given if needed, as noted in patient's After Visit Summary.    Discussed anxiety, nervousness.  History of Present Illness     Patient is here for Medicare Wellness.  Also follow up of chronic medical conditions.  Had a biopsy on her right forearm a week ago - actinic keratosis.       Patient Care Team:  Lea Howard DO as PCP - General (Family Medicine)    Review of Systems   Constitutional:  Negative for chills and fever.   HENT:  Negative for congestion and sore throat.    Respiratory:  Negative for chest tightness.    Cardiovascular:  Negative for chest pain and palpitations.   Gastrointestinal:  Negative for abdominal pain, constipation, diarrhea and nausea.   Genitourinary:  Negative for difficulty urinating.   Skin: Negative.    Neurological:  Positive for tremors. Negative for dizziness and headaches.   Psychiatric/Behavioral:  The patient is nervous/anxious.      Medical History Reviewed by provider this encounter:       Annual Wellness Visit Questionnaire   Katy is here for her Subsequent Wellness visit. Last Medicare Wellness visit information reviewed, patient interviewed and updates made to the record today.      Health Risk Assessment:   Patient rates overall health as fair. Patient feels that their physical health rating is same. Patient is satisfied with their life. Eyesight was rated as same. Hearing was rated as slightly worse. Patient feels that their emotional and mental health rating is same. Patients states they are sometimes  angry. Patient states they are often unusually tired/fatigued. Pain experienced in the last 7 days has been some. Patient's pain rating has been 5/10. Patient states that she has experienced no weight loss or gain in last 6 months.     Depression Screening:   PHQ-9 Score: 6      Fall Risk Screening:   In the past year, patient has experienced: no history of falling in past year      Urinary Incontinence Screening:   Patient has leaked urine accidently in the last six months. Wears a pad during the day and Depends at night    Home Safety:  Patient has trouble with stairs inside or outside of their home. Patient has working smoke alarms and has working carbon monoxide detector. Home safety hazards include: none.     Nutrition:   Current diet is Regular.     Medications:   Patient is currently taking over-the-counter supplements. OTC medications include: see medication list. Patient is able to manage medications.     Activities of Daily Living (ADLs)/Instrumental Activities of Daily Living (IADLs):   Walk and transfer into and out of bed and chair?: Yes  Dress and groom yourself?: Yes    Bathe or shower yourself?: Yes    Feed yourself? Yes  Do your laundry/housekeeping?: Yes  Manage your money, pay your bills and track your expenses?: Yes  Make your own meals?: Yes    Do your own shopping?: Yes    Previous Hospitalizations:   Any hospitalizations or ED visits within the last 12 months?: No      Advance Care Planning:   Living will: Yes    Durable POA for healthcare: Yes    Advanced directive: Yes      PREVENTIVE SCREENINGS      Cardiovascular Screening:    General: Screening Not Indicated and History Lipid Disorder      Diabetes Screening:     General: Screening Current      Colorectal Cancer Screening:     General: Screening Not Indicated      Breast Cancer Screening:     General: Patient Declines      Cervical Cancer Screening:    General: Screening Not Indicated      Osteoporosis Screening:    General: Screening Not  Indicated and History Osteoporosis      Abdominal Aortic Aneurysm (AAA) Screening:        General: Screening Not Indicated      Lung Cancer Screening:     General: Screening Not Indicated      Hepatitis C Screening:    General: Screening Not Indicated    Screening, Brief Intervention, and Referral to Treatment (SBIRT)    Screening    Typical number of drinks in a week: 0    Single Item Drug Screening:  How often have you used an illegal drug (including marijuana) or a prescription medication for non-medical reasons in the past year? never    Single Item Drug Screen Score: 0  Interpretation: Negative screen for possible drug use disorder    Social Determinants of Health     Financial Resource Strain: Low Risk  (4/10/2023)    Overall Financial Resource Strain (CARDIA)    • Difficulty of Paying Living Expenses: Not hard at all   Food Insecurity: No Food Insecurity (6/6/2024)    Hunger Vital Sign    • Worried About Running Out of Food in the Last Year: Never true    • Ran Out of Food in the Last Year: Never true   Transportation Needs: No Transportation Needs (6/6/2024)    PRAPARE - Transportation    • Lack of Transportation (Medical): No    • Lack of Transportation (Non-Medical): No   Housing Stability: Low Risk  (6/6/2024)    Housing Stability Vital Sign    • Unable to Pay for Housing in the Last Year: No    • Number of Times Moved in the Last Year: 1    • Homeless in the Last Year: No   Utilities: Not At Risk (6/6/2024)    [facility] Utilities    • Threatened with loss of utilities: No     No results found.    Objective     /68 (BP Location: Left arm, Patient Position: Sitting, Cuff Size: Standard)   Pulse 78   Temp 97.6 °F (36.4 °C) (Temporal)   Wt 61.2 kg (135 lb)   SpO2 93%   BMI 25.51 kg/m²     Physical Exam  Vitals and nursing note reviewed.   Constitutional:       General: She is not in acute distress.  HENT:      Head: Normocephalic.      Right Ear: Tympanic membrane normal.      Left Ear: Tympanic  membrane normal.      Mouth/Throat:      Pharynx: No posterior oropharyngeal erythema.   Eyes:      General: No scleral icterus.  Neck:      Thyroid: No thyromegaly.      Vascular: No carotid bruit.   Cardiovascular:      Rate and Rhythm: Normal rate and regular rhythm.      Heart sounds: Normal heart sounds. No murmur heard.  Pulmonary:      Effort: Pulmonary effort is normal.      Breath sounds: Normal breath sounds.   Abdominal:      General: Bowel sounds are normal.      Palpations: Abdomen is soft.   Musculoskeletal:      Right lower leg: No edema.      Left lower leg: No edema.   Lymphadenopathy:      Cervical: No cervical adenopathy.   Skin:     General: Skin is warm and dry.      Comments: Stasis changes bilateral lower extremities.  Scabbed lesion on right lower leg and post surgery healing wound on right forearm   Neurological:      Mental Status: She is alert and oriented to person, place, and time.   Psychiatric:         Mood and Affect: Mood normal.       Administrative Statements

## 2024-06-19 PROBLEM — S81.811A LACERATION OF RIGHT LOWER LEG: Status: RESOLVED | Noted: 2024-05-20 | Resolved: 2024-06-19

## 2024-09-03 ENCOUNTER — TELEPHONE (OUTPATIENT)
Age: 89
End: 2024-09-03

## 2024-09-03 NOTE — TELEPHONE ENCOUNTER
Patient called in regards to wanting to know if she is able to petroleum jelly on her cut.Patient would like a call back to confirm.

## 2024-09-05 ENCOUNTER — OFFICE VISIT (OUTPATIENT)
Dept: FAMILY MEDICINE CLINIC | Facility: CLINIC | Age: 89
End: 2024-09-05
Payer: MEDICARE

## 2024-09-05 VITALS
HEART RATE: 78 BPM | TEMPERATURE: 98 F | SYSTOLIC BLOOD PRESSURE: 140 MMHG | DIASTOLIC BLOOD PRESSURE: 70 MMHG | OXYGEN SATURATION: 95 % | BODY MASS INDEX: 26.07 KG/M2 | WEIGHT: 138 LBS
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
## 2024-09-05 DIAGNOSIS — Z51.89 ENCOUNTER FOR WOUND CARE: Primary | ICD-10-CM

## 2024-09-05 PROCEDURE — 99213 OFFICE O/P EST LOW 20 MIN: CPT

## 2024-09-05 PROCEDURE — G2211 COMPLEX E/M VISIT ADD ON: HCPCS

## 2024-09-05 NOTE — PROGRESS NOTES
Ambulatory Visit  Name: Katy Gao      : 1935      MRN: [id number]  Encounter Provider: Kacie Gil PA-C  Encounter Date: 2024   Encounter department: [facility]    Assessment & Plan   1. Encounter for wound care  Assessment & Plan:  Patient scratched by dog yesterday. Wound appears noninfected today. Steri strips and antibiotic ointment were applied to the wound to aid in healing and prevent infection. May cover with bandaid until steri strips fall off on their own. Counseled to let us know if wound ever becomes red, inflamed, or warm to touch as these are signs of infection. Patient understands wound care instructions and will follow up as needed. Tetanus up to date.   2. Dog scratch  Assessment & Plan:  Dog scratch occurred yesterday and is located on posterior left calf. Wound care given and properly dressed.        History of Present Illness     Patient presents today with concerns of a wound on her left posterior calf. She was scratched by a dog yesterday. She is UTD on tetanus. Minimal pain. No drainage or warmth.         Review of Systems   Skin:  Positive for wound.       Objective     /70 (BP Location: Left arm, Patient Position: Sitting, Cuff Size: Standard)   Pulse 78   Temp 98 °F (36.7 °C)   Wt 62.6 kg (138 lb)   SpO2 95%   BMI 26.07 kg/m²     Physical Exam  Vitals and nursing note reviewed.   Constitutional:       General: She is not in acute distress.     Appearance: Normal appearance. She is not ill-appearing.   HENT:      Head: Normocephalic and atraumatic.   Pulmonary:      Effort: Pulmonary effort is normal. No respiratory distress.   Skin:     Coloration: Skin is not cyanotic or pale.             Comments: Scratch wound from left posterior calf. No drainage, warmth, or redness. Wound repaired with steri strips with dressing applied.    Neurological:      General: No focal deficit present.      Mental Status: She is alert and oriented to  person, place, and time.   Psychiatric:         Mood and Affect: Mood normal.         Behavior: Behavior normal.         Judgment: Judgment normal.       Administrative Statements

## 2024-09-05 NOTE — ASSESSMENT & PLAN NOTE
Dog scratch occurred yesterday and is located on posterior left calf. Wound care given and properly dressed.

## 2024-09-05 NOTE — ASSESSMENT & PLAN NOTE
Patient scratched by dog yesterday. Wound appears noninfected today. Steri strips and antibiotic ointment were applied to the wound to aid in healing and prevent infection. May cover with bandaid until steri strips fall off on their own. Counseled to let us know if wound ever becomes red, inflamed, or warm to touch as these are signs of infection. Patient understands wound care instructions and will follow up as needed. Tetanus up to date.

## 2024-09-22 DIAGNOSIS — E78.2 MIXED HYPERLIPIDEMIA: ICD-10-CM

## 2024-09-23 RX ORDER — SIMVASTATIN 20 MG
TABLET ORAL
Qty: 90 TABLET | Refills: 1 | Status: SHIPPED | OUTPATIENT
Start: 2024-09-23

## 2024-09-27 ENCOUNTER — APPOINTMENT (OUTPATIENT)
Dept: LAB | Facility: CLINIC | Age: 89
End: 2024-09-27
Payer: MEDICARE

## 2024-09-27 DIAGNOSIS — E03.9 ACQUIRED HYPOTHYROIDISM: ICD-10-CM

## 2024-09-27 DIAGNOSIS — E78.2 MIXED HYPERLIPIDEMIA: ICD-10-CM

## 2024-09-27 DIAGNOSIS — I10 ESSENTIAL HYPERTENSION: ICD-10-CM

## 2024-09-27 DIAGNOSIS — R73.01 IMPAIRED FASTING GLUCOSE: ICD-10-CM

## 2024-09-27 LAB
ALBUMIN SERPL BCG-MCNC: 3.9 G/DL (ref 3.5–5)
ALP SERPL-CCNC: 39 U/L (ref 34–104)
ALT SERPL W P-5'-P-CCNC: 11 U/L (ref 7–52)
ANION GAP SERPL CALCULATED.3IONS-SCNC: 10 MMOL/L (ref 4–13)
AST SERPL W P-5'-P-CCNC: 18 U/L (ref 13–39)
BASOPHILS # BLD AUTO: 0.05 THOUSANDS/ΜL (ref 0–0.1)
BASOPHILS NFR BLD AUTO: 1 % (ref 0–1)
BILIRUB SERPL-MCNC: 0.77 MG/DL (ref 0.2–1)
BUN SERPL-MCNC: 18 MG/DL (ref 5–25)
CALCIUM SERPL-MCNC: 9.2 MG/DL (ref 8.4–10.2)
CHLORIDE SERPL-SCNC: 102 MMOL/L (ref 96–108)
CHOLEST SERPL-MCNC: 140 MG/DL
CO2 SERPL-SCNC: 27 MMOL/L (ref 21–32)
CREAT SERPL-MCNC: 0.75 MG/DL (ref 0.6–1.3)
EOSINOPHIL # BLD AUTO: 0.23 THOUSAND/ΜL (ref 0–0.61)
EOSINOPHIL NFR BLD AUTO: 3 % (ref 0–6)
ERYTHROCYTE [DISTWIDTH] IN BLOOD BY AUTOMATED COUNT: 13.6 % (ref 11.6–15.1)
EST. AVERAGE GLUCOSE BLD GHB EST-MCNC: 120 MG/DL
GFR SERPL CREATININE-BSD FRML MDRD: 71 ML/MIN/1.73SQ M
GLUCOSE P FAST SERPL-MCNC: 119 MG/DL (ref 65–99)
HBA1C MFR BLD: 5.8 %
HCT VFR BLD AUTO: 45.6 % (ref 34.8–46.1)
HDLC SERPL-MCNC: 53 MG/DL
HGB BLD-MCNC: 14.7 G/DL (ref 11.5–15.4)
IMM GRANULOCYTES # BLD AUTO: 0.01 THOUSAND/UL (ref 0–0.2)
IMM GRANULOCYTES NFR BLD AUTO: 0 % (ref 0–2)
LDLC SERPL CALC-MCNC: 66 MG/DL (ref 0–100)
LYMPHOCYTES # BLD AUTO: 1.47 THOUSANDS/ΜL (ref 0.6–4.47)
LYMPHOCYTES NFR BLD AUTO: 21 % (ref 14–44)
MCH RBC QN AUTO: 29.8 PG (ref 26.8–34.3)
MCHC RBC AUTO-ENTMCNC: 32.2 G/DL (ref 31.4–37.4)
MCV RBC AUTO: 93 FL (ref 82–98)
MONOCYTES # BLD AUTO: 0.68 THOUSAND/ΜL (ref 0.17–1.22)
MONOCYTES NFR BLD AUTO: 10 % (ref 4–12)
NEUTROPHILS # BLD AUTO: 4.58 THOUSANDS/ΜL (ref 1.85–7.62)
NEUTS SEG NFR BLD AUTO: 65 % (ref 43–75)
NRBC BLD AUTO-RTO: 0 /100 WBCS
PLATELET # BLD AUTO: 195 THOUSANDS/UL (ref 149–390)
PMV BLD AUTO: 12.6 FL (ref 8.9–12.7)
POTASSIUM SERPL-SCNC: 4.4 MMOL/L (ref 3.5–5.3)
PROT SERPL-MCNC: 6.4 G/DL (ref 6.4–8.4)
RBC # BLD AUTO: 4.93 MILLION/UL (ref 3.81–5.12)
SODIUM SERPL-SCNC: 139 MMOL/L (ref 135–147)
TRIGL SERPL-MCNC: 105 MG/DL
TSH SERPL DL<=0.05 MIU/L-ACNC: 3 UIU/ML (ref 0.45–4.5)
WBC # BLD AUTO: 7.02 THOUSAND/UL (ref 4.31–10.16)

## 2024-09-27 PROCEDURE — 80061 LIPID PANEL: CPT

## 2024-09-27 PROCEDURE — 84443 ASSAY THYROID STIM HORMONE: CPT

## 2024-09-27 PROCEDURE — 36415 COLL VENOUS BLD VENIPUNCTURE: CPT

## 2024-09-27 PROCEDURE — 80053 COMPREHEN METABOLIC PANEL: CPT

## 2024-09-27 PROCEDURE — 83036 HEMOGLOBIN GLYCOSYLATED A1C: CPT

## 2024-09-27 PROCEDURE — 85025 COMPLETE CBC W/AUTO DIFF WBC: CPT

## 2024-10-11 ENCOUNTER — OFFICE VISIT (OUTPATIENT)
Dept: FAMILY MEDICINE CLINIC | Facility: CLINIC | Age: 89
End: 2024-10-11
Payer: MEDICARE

## 2024-10-11 VITALS
OXYGEN SATURATION: 97 % | TEMPERATURE: 97.5 F | HEART RATE: 76 BPM | DIASTOLIC BLOOD PRESSURE: 70 MMHG | BODY MASS INDEX: 25.98 KG/M2 | WEIGHT: 137.6 LBS | HEIGHT: 61 IN | SYSTOLIC BLOOD PRESSURE: 138 MMHG

## 2024-10-11 DIAGNOSIS — J43.2 CENTRILOBULAR EMPHYSEMA (HCC): ICD-10-CM

## 2024-10-11 DIAGNOSIS — R73.01 IMPAIRED FASTING GLUCOSE: ICD-10-CM

## 2024-10-11 DIAGNOSIS — E78.2 MIXED HYPERLIPIDEMIA: ICD-10-CM

## 2024-10-11 DIAGNOSIS — R73.03 PREDIABETES: ICD-10-CM

## 2024-10-11 DIAGNOSIS — Z23 ENCOUNTER FOR IMMUNIZATION: ICD-10-CM

## 2024-10-11 DIAGNOSIS — R25.1 TREMOR: ICD-10-CM

## 2024-10-11 DIAGNOSIS — I73.9 PERIPHERAL VASCULAR DISEASE (HCC): ICD-10-CM

## 2024-10-11 DIAGNOSIS — I10 ESSENTIAL HYPERTENSION: Primary | ICD-10-CM
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
## 2024-10-11 PROCEDURE — G0008 ADMIN INFLUENZA VIRUS VAC: HCPCS

## 2024-10-11 PROCEDURE — 99214 OFFICE O/P EST MOD 30 MIN: CPT | Performed by: FAMILY MEDICINE

## 2024-10-11 PROCEDURE — 90662 IIV NO PRSV INCREASED AG IM: CPT

## 2024-10-11 NOTE — PROGRESS NOTES
"Ambulatory Visit  Name: Katy Gao      : 1935      MRN: [id number]  Encounter Provider: Lea Howard DO  Encounter Date: 10/11/2024   Encounter department: [facility]    Assessment & Plan  Essential hypertension  Blood pressure is stable.       Encounter for immunization    Orders:    influenza vaccine, high-dose, PF 0.5 mL (Fluzone High Dose)    Peripheral vascular disease (HCC)         Centrilobular emphysema (HCC)         Impaired fasting glucose  A1C 5.8  Discussed watching carbs. Certainly not to be extremely concerned at her age. Not that far out of range.       Mixed hyperlipidemia  Chol 140 and triglycerides 105       Tremor         Prediabetes       Discussed patient's concerns and worries about her health. I told her that most of the things she is concerned about are a normal part of aging.  Follow up in February.     History of Present Illness     Shawn is here for follow up of chronic medical conditions.   Admits to occasional chest pain - maybe gas pain - \"just put it on the list\".  She is  66 years          Review of Systems   Constitutional:  Positive for fatigue. Negative for chills and fever.   HENT:  Negative for congestion and sore throat.    Respiratory:  Negative for chest tightness.    Cardiovascular:  Negative for chest pain and palpitations.   Gastrointestinal:  Negative for abdominal pain, constipation, diarrhea and nausea.   Genitourinary:  Negative for difficulty urinating.   Skin: Negative.    Neurological:  Positive for tremors. Negative for dizziness and headaches.   Psychiatric/Behavioral: Negative.             Objective     /70 (BP Location: Left arm, Patient Position: Sitting)   Pulse 76   Temp 97.5 °F (36.4 °C)   Ht 5' 1\" (1.549 m)   Wt 62.4 kg (137 lb 9.6 oz)   SpO2 97%   BMI 26.00 kg/m²     Physical Exam  Vitals and nursing note reviewed.   Constitutional:       General: She is not in acute distress.  HENT:      Head: " Normocephalic.   Neck:      Thyroid: No thyromegaly.   Cardiovascular:      Rate and Rhythm: Normal rate and regular rhythm.      Heart sounds: Normal heart sounds.   Pulmonary:      Effort: Pulmonary effort is normal.      Breath sounds: Normal breath sounds.   Musculoskeletal:      Right lower leg: No edema.      Left lower leg: No edema.   Lymphadenopathy:      Cervical: No cervical adenopathy.   Skin:     General: Skin is warm and dry.   Neurological:      Mental Status: She is alert and oriented to person, place, and time.   Psychiatric:         Mood and Affect: Mood normal.

## 2024-10-11 NOTE — ASSESSMENT & PLAN NOTE
Discussed patient's concerns and worries about her health. I told her that most of the things she is concerned about are a normal part of aging.  Follow up in February.

## 2024-10-11 NOTE — ASSESSMENT & PLAN NOTE
A1C 5.8  Discussed watching carbs. Certainly not to be extremely concerned at her age. Not that far out of range.

## 2024-10-20 DIAGNOSIS — I10 ESSENTIAL HYPERTENSION: ICD-10-CM

## 2024-10-22 RX ORDER — LOSARTAN POTASSIUM 100 MG/1
TABLET ORAL
Qty: 90 TABLET | Refills: 0 | Status: SHIPPED | OUTPATIENT
Start: 2024-10-22

## 2024-12-01 DIAGNOSIS — I10 ESSENTIAL HYPERTENSION: ICD-10-CM

## 2024-12-02 DIAGNOSIS — E03.9 ACQUIRED HYPOTHYROIDISM: ICD-10-CM

## 2024-12-03 RX ORDER — METOPROLOL SUCCINATE 100 MG/1
100 TABLET, EXTENDED RELEASE ORAL DAILY
Qty: 90 TABLET | Refills: 1 | Status: SHIPPED | OUTPATIENT
Start: 2024-12-03

## 2024-12-03 RX ORDER — LEVOTHYROXINE SODIUM 25 UG/1
25 TABLET ORAL DAILY
Qty: 90 TABLET | Refills: 1 | Status: SHIPPED | OUTPATIENT
Start: 2024-12-03

## 2025-01-19 DIAGNOSIS — I10 ESSENTIAL HYPERTENSION: ICD-10-CM

## 2025-01-20 RX ORDER — LOSARTAN POTASSIUM 100 MG/1
100 TABLET ORAL DAILY
Qty: 90 TABLET | Refills: 1 | Status: SHIPPED | OUTPATIENT
Start: 2025-01-20

## 2025-02-12 ENCOUNTER — TELEPHONE (OUTPATIENT)
Dept: OTHER | Facility: OTHER | Age: 89
End: 2025-02-12

## 2025-02-12 NOTE — TELEPHONE ENCOUNTER
Patient is calling regarding cancelling an appointment.    Date/Time: 2/12/2025, 9:45 AM    Patient was rescheduled: YES [x] NO []    Rescheduled appointment on 2/18/2025 at 12:30 PM.

## 2025-02-18 ENCOUNTER — OFFICE VISIT (OUTPATIENT)
Dept: FAMILY MEDICINE CLINIC | Facility: CLINIC | Age: 89
End: 2025-02-18
Payer: MEDICARE

## 2025-02-18 VITALS
HEIGHT: 61 IN | TEMPERATURE: 98.5 F | SYSTOLIC BLOOD PRESSURE: 160 MMHG | BODY MASS INDEX: 26.09 KG/M2 | DIASTOLIC BLOOD PRESSURE: 92 MMHG | HEART RATE: 95 BPM | WEIGHT: 138.2 LBS | OXYGEN SATURATION: 86 %

## 2025-02-18 DIAGNOSIS — R73.01 IMPAIRED FASTING GLUCOSE: ICD-10-CM

## 2025-02-18 DIAGNOSIS — E78.2 MIXED HYPERLIPIDEMIA: ICD-10-CM

## 2025-02-18 DIAGNOSIS — I10 ESSENTIAL HYPERTENSION: Primary | ICD-10-CM

## 2025-02-18 DIAGNOSIS — J43.2 CENTRILOBULAR EMPHYSEMA (HCC): ICD-10-CM

## 2025-02-18 DIAGNOSIS — E03.9 ACQUIRED HYPOTHYROIDISM: ICD-10-CM

## 2025-02-18 DIAGNOSIS — R41.3 MEMORY CHANGE: ICD-10-CM

## 2025-02-18 DIAGNOSIS — R25.1 TREMOR: ICD-10-CM

## 2025-02-18 PROCEDURE — 99214 OFFICE O/P EST MOD 30 MIN: CPT | Performed by: FAMILY MEDICINE

## 2025-02-18 PROCEDURE — G2211 COMPLEX E/M VISIT ADD ON: HCPCS | Performed by: FAMILY MEDICINE

## 2025-02-18 RX ORDER — VALSARTAN 160 MG/1
160 TABLET ORAL DAILY
Qty: 90 TABLET | Refills: 1 | Status: SHIPPED | OUTPATIENT
Start: 2025-02-18

## 2025-02-18 NOTE — ASSESSMENT & PLAN NOTE
Orders:  •  Lipid Panel with Direct LDL reflex; Future  •  Comprehensive metabolic panel; Future

## 2025-02-18 NOTE — PROGRESS NOTES
Name: Katy Gao      : 1935      MRN: [id number]  Encounter Provider: Lea Howard DO  Encounter Date: 2025   Encounter department: [facility]  :  Assessment & Plan  Essential hypertension    Orders:  •  Comprehensive metabolic panel; Future  •  CBC and differential; Future  •  valsartan (DIOVAN) 160 mg tablet; Take 1 tablet (160 mg total) by mouth daily    Acquired hypothyroidism    Orders:  •  TSH, 3rd generation with Free T4 reflex; Future  •  CBC and differential; Future    Impaired fasting glucose    Orders:  •  Comprehensive metabolic panel; Future  •  CBC and differential; Future  •  Hemoglobin A1C; Future    Mixed hyperlipidemia    Orders:  •  Lipid Panel with Direct LDL reflex; Future  •  Comprehensive metabolic panel; Future    Memory change    Orders:  •  Vitamin B12; Future    Tremor    Orders:  •  Vitamin B12; Future    Centrilobular emphysema (HCC)               Urinary Incontinence Plan of Care: counseling topics discussed: use restroom every 2 hours and limiting fluid intake 3-4 hours before bed.     Follow up in two weeks.    History of Present Illness   Patient is seen for follow up of chronic medical conditions.  She brings a list of complaints that have been typical of her last few appointments - feeling shaky, nervous. She doesn't know what she is worried about.    Patient presents with:  Memory Loss: Patient reports feeling more confused lately and fearful- she cannot pinpoint what she feels afraid of.  Shaking: Feels shaky everywhere  Difficulty Swallowing: Feels like she needs to clear throat often, chew food thoroughly, and is afraid of choking  Skin Problem: Check left eyebrow, and right shin          Review of Systems   Respiratory:  Positive for shortness of breath.    Cardiovascular:  Negative for chest pain.   Neurological:         Hands are shaky   Psychiatric/Behavioral:  Positive for confusion. The patient is nervous/anxious.        Objective  "  /92 (BP Location: Left arm, Patient Position: Sitting, Cuff Size: Adult)   Pulse 95   Temp 98.5 °F (36.9 °C)   Ht 5' 1\" (1.549 m)   Wt 62.7 kg (138 lb 3.2 oz)   SpO2 (!) 86%   BMI 26.11 kg/m²      Physical Exam  Vitals and nursing note reviewed.   Constitutional:       General: She is not in acute distress.  HENT:      Head: Normocephalic.   Neck:      Thyroid: No thyromegaly.   Cardiovascular:      Rate and Rhythm: Normal rate and regular rhythm.      Heart sounds: Normal heart sounds.   Pulmonary:      Effort: Pulmonary effort is normal.      Breath sounds: Normal breath sounds.   Musculoskeletal:      Right lower leg: No edema.      Left lower leg: No edema.   Lymphadenopathy:      Cervical: No cervical adenopathy.   Skin:     General: Skin is warm and dry.   Neurological:      Mental Status: She is alert and oriented to person, place, and time.   Psychiatric:         Mood and Affect: Mood normal.         "

## 2025-02-18 NOTE — ASSESSMENT & PLAN NOTE
Orders:  •  Comprehensive metabolic panel; Future  •  CBC and differential; Future  •  valsartan (DIOVAN) 160 mg tablet; Take 1 tablet (160 mg total) by mouth daily

## 2025-02-18 NOTE — ASSESSMENT & PLAN NOTE
Orders:  •  Comprehensive metabolic panel; Future  •  CBC and differential; Future  •  Hemoglobin A1C; Future

## 2025-02-22 ENCOUNTER — APPOINTMENT (OUTPATIENT)
Dept: LAB | Facility: CLINIC | Age: 89
End: 2025-02-22
Payer: MEDICARE

## 2025-02-22 DIAGNOSIS — R25.1 TREMOR: ICD-10-CM

## 2025-02-22 DIAGNOSIS — E03.9 ACQUIRED HYPOTHYROIDISM: ICD-10-CM

## 2025-02-22 DIAGNOSIS — R41.3 MEMORY CHANGE: ICD-10-CM

## 2025-02-22 DIAGNOSIS — I10 ESSENTIAL HYPERTENSION: ICD-10-CM

## 2025-02-22 DIAGNOSIS — R73.01 IMPAIRED FASTING GLUCOSE: ICD-10-CM

## 2025-02-22 DIAGNOSIS — E78.2 MIXED HYPERLIPIDEMIA: ICD-10-CM

## 2025-02-22 LAB
ALBUMIN SERPL BCG-MCNC: 3.9 G/DL (ref 3.5–5)
ALP SERPL-CCNC: 40 U/L (ref 34–104)
ALT SERPL W P-5'-P-CCNC: 11 U/L (ref 7–52)
ANION GAP SERPL CALCULATED.3IONS-SCNC: 5 MMOL/L (ref 4–13)
AST SERPL W P-5'-P-CCNC: 16 U/L (ref 13–39)
BASOPHILS # BLD AUTO: 0.03 THOUSANDS/ΜL (ref 0–0.1)
BASOPHILS NFR BLD AUTO: 0 % (ref 0–1)
BILIRUB SERPL-MCNC: 0.68 MG/DL (ref 0.2–1)
BUN SERPL-MCNC: 25 MG/DL (ref 5–25)
CALCIUM SERPL-MCNC: 9.1 MG/DL (ref 8.4–10.2)
CHLORIDE SERPL-SCNC: 104 MMOL/L (ref 96–108)
CHOLEST SERPL-MCNC: 126 MG/DL (ref ?–200)
CO2 SERPL-SCNC: 30 MMOL/L (ref 21–32)
CREAT SERPL-MCNC: 0.73 MG/DL (ref 0.6–1.3)
EOSINOPHIL # BLD AUTO: 0.15 THOUSAND/ΜL (ref 0–0.61)
EOSINOPHIL NFR BLD AUTO: 2 % (ref 0–6)
ERYTHROCYTE [DISTWIDTH] IN BLOOD BY AUTOMATED COUNT: 13.6 % (ref 11.6–15.1)
EST. AVERAGE GLUCOSE BLD GHB EST-MCNC: 123 MG/DL
GFR SERPL CREATININE-BSD FRML MDRD: 74 ML/MIN/1.73SQ M
GLUCOSE P FAST SERPL-MCNC: 117 MG/DL (ref 65–99)
HBA1C MFR BLD: 5.9 %
HCT VFR BLD AUTO: 44.2 % (ref 34.8–46.1)
HDLC SERPL-MCNC: 55 MG/DL
HGB BLD-MCNC: 14.4 G/DL (ref 11.5–15.4)
IMM GRANULOCYTES # BLD AUTO: 0.02 THOUSAND/UL (ref 0–0.2)
IMM GRANULOCYTES NFR BLD AUTO: 0 % (ref 0–2)
LDLC SERPL CALC-MCNC: 54 MG/DL (ref 0–100)
LYMPHOCYTES # BLD AUTO: 1.32 THOUSANDS/ΜL (ref 0.6–4.47)
LYMPHOCYTES NFR BLD AUTO: 18 % (ref 14–44)
MCH RBC QN AUTO: 29.8 PG (ref 26.8–34.3)
MCHC RBC AUTO-ENTMCNC: 32.6 G/DL (ref 31.4–37.4)
MCV RBC AUTO: 92 FL (ref 82–98)
MONOCYTES # BLD AUTO: 0.7 THOUSAND/ΜL (ref 0.17–1.22)
MONOCYTES NFR BLD AUTO: 10 % (ref 4–12)
NEUTROPHILS # BLD AUTO: 5.13 THOUSANDS/ΜL (ref 1.85–7.62)
NEUTS SEG NFR BLD AUTO: 70 % (ref 43–75)
NRBC BLD AUTO-RTO: 0 /100 WBCS
PLATELET # BLD AUTO: 165 THOUSANDS/UL (ref 149–390)
PMV BLD AUTO: 13.3 FL (ref 8.9–12.7)
POTASSIUM SERPL-SCNC: 4.2 MMOL/L (ref 3.5–5.3)
PROT SERPL-MCNC: 6.5 G/DL (ref 6.4–8.4)
RBC # BLD AUTO: 4.83 MILLION/UL (ref 3.81–5.12)
SODIUM SERPL-SCNC: 139 MMOL/L (ref 135–147)
TRIGL SERPL-MCNC: 87 MG/DL (ref ?–150)
TSH SERPL DL<=0.05 MIU/L-ACNC: 1.65 UIU/ML (ref 0.45–4.5)
VIT B12 SERPL-MCNC: 524 PG/ML (ref 180–914)
WBC # BLD AUTO: 7.35 THOUSAND/UL (ref 4.31–10.16)

## 2025-02-22 PROCEDURE — 80053 COMPREHEN METABOLIC PANEL: CPT

## 2025-02-22 PROCEDURE — 82607 VITAMIN B-12: CPT

## 2025-02-22 PROCEDURE — 85025 COMPLETE CBC W/AUTO DIFF WBC: CPT

## 2025-02-22 PROCEDURE — 83036 HEMOGLOBIN GLYCOSYLATED A1C: CPT

## 2025-02-22 PROCEDURE — 80061 LIPID PANEL: CPT

## 2025-02-22 PROCEDURE — 84443 ASSAY THYROID STIM HORMONE: CPT

## 2025-02-22 PROCEDURE — 36415 COLL VENOUS BLD VENIPUNCTURE: CPT

## 2025-02-24 DIAGNOSIS — J43.2 CENTRILOBULAR EMPHYSEMA (HCC): ICD-10-CM

## 2025-02-25 RX ORDER — TIOTROPIUM BROMIDE INHALATION SPRAY 3.12 UG/1
SPRAY, METERED RESPIRATORY (INHALATION)
Qty: 8 G | Refills: 0 | Status: SHIPPED | OUTPATIENT
Start: 2025-02-25

## 2025-02-28 ENCOUNTER — RESULTS FOLLOW-UP (OUTPATIENT)
Dept: FAMILY MEDICINE CLINIC | Facility: CLINIC | Age: 89
End: 2025-02-28

## 2025-03-04 ENCOUNTER — OFFICE VISIT (OUTPATIENT)
Dept: FAMILY MEDICINE CLINIC | Facility: CLINIC | Age: 89
End: 2025-03-04
Payer: MEDICARE

## 2025-03-04 VITALS
HEIGHT: 61 IN | WEIGHT: 137.8 LBS | HEART RATE: 92 BPM | TEMPERATURE: 98.6 F | SYSTOLIC BLOOD PRESSURE: 160 MMHG | OXYGEN SATURATION: 97 % | BODY MASS INDEX: 26.01 KG/M2 | DIASTOLIC BLOOD PRESSURE: 90 MMHG

## 2025-03-04 DIAGNOSIS — E03.9 ACQUIRED HYPOTHYROIDISM: ICD-10-CM

## 2025-03-04 DIAGNOSIS — I10 ESSENTIAL HYPERTENSION: Primary | ICD-10-CM

## 2025-03-04 DIAGNOSIS — R41.3 MEMORY CHANGES: ICD-10-CM

## 2025-03-04 DIAGNOSIS — R73.03 PREDIABETES: ICD-10-CM

## 2025-03-04 PROBLEM — R41.89 COGNITIVE DECLINE: Status: ACTIVE | Noted: 2025-03-04

## 2025-03-04 PROCEDURE — G2211 COMPLEX E/M VISIT ADD ON: HCPCS | Performed by: FAMILY MEDICINE

## 2025-03-04 PROCEDURE — 99214 OFFICE O/P EST MOD 30 MIN: CPT | Performed by: FAMILY MEDICINE

## 2025-03-04 NOTE — PROGRESS NOTES
"Name: Katy Gao      : 1935      MRN: [id number]  Encounter Provider: Lea Howard DO  Encounter Date: 3/4/2025   Encounter department: [facility]  :  Assessment & Plan  Essential hypertension  Systolic still elevated. Will have her come back for nurse visit.       Prediabetes         Acquired hypothyroidism         Memory changes  MOCA - score 19/30. In , she scored 24/30.              History of Present Illness   Patient is seen for follow up to concerns about memory, etc from last visit.  Also concerned about lesion on left eyelid.      Review of Systems   Constitutional:  Negative for chills and fever.   HENT:  Negative for congestion and sore throat.    Respiratory:  Negative for chest tightness.    Cardiovascular:  Negative for chest pain and palpitations.   Gastrointestinal:  Negative for abdominal pain, constipation, diarrhea and nausea.   Genitourinary:  Negative for difficulty urinating.   Skin: Negative.    Neurological:  Negative for dizziness and headaches.   Psychiatric/Behavioral: Negative.         Objective   /90 (BP Location: Left arm, Patient Position: Sitting, Cuff Size: Large)   Pulse 92   Temp 98.6 °F (37 °C) (Temporal)   Ht 5' 1\" (1.549 m)   Wt 62.5 kg (137 lb 12.8 oz)   SpO2 97%   BMI 26.04 kg/m²      Physical Exam  Vitals and nursing note reviewed.   Constitutional:       General: She is not in acute distress.  Neck:      Thyroid: No thyromegaly.   Cardiovascular:      Rate and Rhythm: Normal rate and regular rhythm.      Heart sounds: Normal heart sounds.   Pulmonary:      Effort: Pulmonary effort is normal.      Breath sounds: Normal breath sounds.   Musculoskeletal:      Right lower leg: No edema.      Left lower leg: No edema.   Lymphadenopathy:      Cervical: No cervical adenopathy.   Skin:     General: Skin is warm and dry.      Findings: Lesion (dry lesion  on lateral side of right leg just berlow knee. Also erythematous spot left " eyebrow) present.   Neurological:      Mental Status: She is alert and oriented to person, place, and time.   Psychiatric:         Mood and Affect: Mood normal.

## 2025-03-18 ENCOUNTER — CLINICAL SUPPORT (OUTPATIENT)
Dept: FAMILY MEDICINE CLINIC | Facility: CLINIC | Age: 89
End: 2025-03-18

## 2025-03-18 DIAGNOSIS — I10 ESSENTIAL HYPERTENSION: Primary | ICD-10-CM

## 2025-03-18 PROCEDURE — NURSE

## 2025-03-18 NOTE — PROGRESS NOTES
I rechecked patient's BP it was 150/80. We will continue on Valsartan 160 mg and Metoprolol  mg daily. I asked her to return in one month.

## 2025-03-18 NOTE — PROGRESS NOTES
Pt came in today for blood pressure check. Reading was 160/80. Spoke with Dr. Howard she stated she will talk with pt at this time.

## 2025-03-23 DIAGNOSIS — E78.2 MIXED HYPERLIPIDEMIA: ICD-10-CM

## 2025-03-24 RX ORDER — SIMVASTATIN 20 MG
20 TABLET ORAL
Qty: 90 TABLET | Refills: 1 | Status: SHIPPED | OUTPATIENT
Start: 2025-03-24

## 2025-04-14 ENCOUNTER — RA CDI HCC (OUTPATIENT)
Dept: OTHER | Facility: HOSPITAL | Age: 89
End: 2025-04-14

## 2025-04-21 ENCOUNTER — OFFICE VISIT (OUTPATIENT)
Dept: FAMILY MEDICINE CLINIC | Facility: CLINIC | Age: 89
End: 2025-04-21
Payer: MEDICARE

## 2025-04-21 VITALS
SYSTOLIC BLOOD PRESSURE: 158 MMHG | HEART RATE: 75 BPM | WEIGHT: 139.8 LBS | OXYGEN SATURATION: 96 % | TEMPERATURE: 97.8 F | BODY MASS INDEX: 26.41 KG/M2 | DIASTOLIC BLOOD PRESSURE: 74 MMHG

## 2025-04-21 DIAGNOSIS — K21.9 GASTROESOPHAGEAL REFLUX DISEASE, UNSPECIFIED WHETHER ESOPHAGITIS PRESENT: ICD-10-CM

## 2025-04-21 DIAGNOSIS — E03.9 ACQUIRED HYPOTHYROIDISM: ICD-10-CM

## 2025-04-21 DIAGNOSIS — R73.03 PREDIABETES: ICD-10-CM

## 2025-04-21 DIAGNOSIS — I10 ESSENTIAL HYPERTENSION: Primary | ICD-10-CM

## 2025-04-21 PROCEDURE — 99214 OFFICE O/P EST MOD 30 MIN: CPT | Performed by: FAMILY MEDICINE

## 2025-04-21 PROCEDURE — G2211 COMPLEX E/M VISIT ADD ON: HCPCS | Performed by: FAMILY MEDICINE

## 2025-04-21 RX ORDER — FAMOTIDINE 40 MG/1
40 TABLET, FILM COATED ORAL DAILY
Qty: 30 TABLET | Refills: 2 | Status: SHIPPED | OUTPATIENT
Start: 2025-04-21

## 2025-04-21 NOTE — ASSESSMENT & PLAN NOTE
History  Chief Complaint   Patient presents with    Cough     Pt reports cough for two weeks, productive with yellow sputum. Pt reports she was laying down and every time she takes a breath in she has a coughing fit, can't catch her breath. Denies chest pain      Patient is a 34-year-old female with past medical history of asthma, seasonal allergies, iron deficiency anemia, presents to the emergency department for acute cough that started about 4 days ago.  Patient states she has had a productive cough which was initially active of thick green phlegm but now the sputum is thinner and yellow in color.  She states that when she takes a deep breath she gets a coughing fit and she feels discomfort in her throat.  She states she thought she might be wheezing but she listen to her lungs with a stethoscope that she had at home and did not hear any wheezing.  She states she does report sore throat but attributes that to coughing and has also had runny nose and congestion.  She denies any fevers or chills, headache, dizziness or near syncope, chest pain, palpitations, hemoptysis, abdominal pain, nausea, vomiting, diarrhea, constipation, urinary symptoms, skin rash/color change, leg pain/swelling, focal neurologic deficits.        History provided by:  Patient   used: No    Cough  Associated symptoms: rhinorrhea, shortness of breath and sore throat    Associated symptoms: no chest pain, no chills, no ear pain, no fever, no headaches, no rash and no wheezing        Prior to Admission Medications   Prescriptions Last Dose Informant Patient Reported? Taking?   ALPRAZolam (XANAX) 0.25 mg tablet   Yes No   Si.125 mg   ARIPiprazole (ABILIFY) 2 mg tablet   Yes No   Si mg   DULoxetine (CYMBALTA) 20 mg capsule  Self Yes No   Si mg   Docusate Sodium 100 MG capsule   Yes No   Sig: TAKE ONE CAPSULE BY MOUTH TWICE A DAY FOR CONSTIPATION   Levonorgestrel (MIRENA) 20 MCG/DAY IUD   Yes No   Si each by  Systolic blood pressure remains borderline.  Patient denies any chest pain shortness of breath, dizziness or headache.  Orders:  •  TSH, 3rd generation with Free T4 reflex; Future  •  CBC and differential; Future  •  Comprehensive metabolic panel; Future   Intrauterine route   Multiple Vitamins-Minerals (MULTIVITAMIN ADULT PO)  Self Yes No   Sig: Take 1 tablet by mouth daily   ferrous sulfate 324 (65 Fe) mg   No No   Sig: Take 1 tablet (324 mg total) by mouth daily before breakfast   fexofenadine-pseudoephedrine (ALLEGRA-D 24) 180-240 MG per 24 hr tablet  Self Yes No   Sig: Take 1 tablet by mouth daily   phytonadione (MEPHYTON) 5 mg tablet   Yes No   Sig: Take 2 tablets by mouth daily   sulfaSALAzine (AZULFIDINE) 500 mg tablet   Yes No   Sig: Take 500 mg by mouth 4 (four) times a day   valACYclovir (VALTREX) 1,000 mg tablet   Yes No      Facility-Administered Medications: None       Past Medical History:   Diagnosis Date    Allergic     Anemia     Asthma     Lyme disease 01/2021       Past Surgical History:   Procedure Laterality Date    DILATION AND CURETTAGE OF UTERUS      WISDOM TOOTH EXTRACTION         Family History   Problem Relation Age of Onset    No Known Problems Mother     No Known Problems Father     Cancer Maternal Aunt      I have reviewed and agree with the history as documented.    E-Cigarette/Vaping    E-Cigarette Use Never User      E-Cigarette/Vaping Substances     Social History     Tobacco Use    Smoking status: Never    Smokeless tobacco: Never   Vaping Use    Vaping status: Never Used   Substance Use Topics    Alcohol use: Yes    Drug use: Never       Review of Systems   Constitutional:  Negative for chills and fever.   HENT:  Positive for congestion, rhinorrhea and sore throat. Negative for ear pain and trouble swallowing.    Respiratory:  Positive for cough and shortness of breath. Negative for chest tightness and wheezing.    Cardiovascular:  Negative for chest pain and palpitations.   Gastrointestinal:  Negative for abdominal pain, constipation, diarrhea, nausea and vomiting.   Genitourinary:  Negative for dysuria, flank pain, frequency and hematuria.   Musculoskeletal:  Negative for back pain and neck pain.   Skin:  Negative for color  change, pallor, rash and wound.   Allergic/Immunologic: Negative for immunocompromised state.   Neurological:  Negative for dizziness, syncope, weakness, light-headedness, numbness and headaches.   Hematological:  Negative for adenopathy.   Psychiatric/Behavioral:  Negative for confusion and decreased concentration.    All other systems reviewed and are negative.      Physical Exam  Physical Exam  Vitals and nursing note reviewed.   Constitutional:       General: She is not in acute distress.     Appearance: Normal appearance. She is well-developed. She is not ill-appearing, toxic-appearing or diaphoretic.   HENT:      Head: Normocephalic and atraumatic.      Right Ear: External ear normal.      Left Ear: External ear normal.      Nose: Congestion present.      Mouth/Throat:      Mouth: Mucous membranes are moist.      Pharynx: Oropharynx is clear. Posterior oropharyngeal erythema present. No oropharyngeal exudate.      Comments: No significant tonsillar hypertrophy but posterior oropharynx is erythematous and there is either a tonsil stone or exudate on the right tonsil.  No evidence of peritonsillar abscess.  No trismus.  Eyes:      Extraocular Movements: Extraocular movements intact.      Conjunctiva/sclera: Conjunctivae normal.   Neck:      Vascular: No JVD.   Cardiovascular:      Rate and Rhythm: Normal rate and regular rhythm.      Pulses: Normal pulses.      Heart sounds: Normal heart sounds. No murmur heard.     No friction rub. No gallop.   Pulmonary:      Effort: No respiratory distress.      Breath sounds: No stridor. No wheezing, rhonchi or rales.      Comments: Patient coughs with deep inhalation and full exhalation.  Decreased air movement throughout but no audible stridor or wheezing.  Abdominal:      General: There is no distension.      Palpations: Abdomen is soft.      Tenderness: There is no abdominal tenderness. There is no guarding or rebound.   Musculoskeletal:         General: No swelling or  "tenderness. Normal range of motion.      Cervical back: Normal range of motion and neck supple. No rigidity.   Skin:     General: Skin is warm and dry.      Coloration: Skin is not pale.      Findings: No erythema or rash.   Neurological:      General: No focal deficit present.      Mental Status: She is alert and oriented to person, place, and time.      Sensory: No sensory deficit.      Motor: No weakness.   Psychiatric:         Mood and Affect: Mood normal.         Behavior: Behavior normal.         Vital Signs  ED Triage Vitals   Temperature Pulse Respirations Blood Pressure SpO2   05/14/24 2251 05/14/24 2251 05/14/24 2251 05/14/24 2251 05/14/24 2251   98.3 °F (36.8 °C) 98 18 134/85 99 %      Temp Source Heart Rate Source Patient Position - Orthostatic VS BP Location FiO2 (%)   05/14/24 2251 05/14/24 2251 05/14/24 2300 05/14/24 2300 --   Oral Monitor Sitting Right arm       Pain Score       --                Vitals:    05/14/24 2251 05/14/24 2300 05/14/24 2330   BP: 134/85 134/85 130/73   BP Location:  Right arm Right arm   Pulse: 98 98 93   Resp: 18 17 18   Temp: 98.3 °F (36.8 °C)     TempSrc: Oral     SpO2: 99% 98% 96%   Weight: 85.3 kg (188 lb)     Height: 5' 6\" (1.676 m)            Visual Acuity      ED Medications  Medications   albuterol (PROVENTIL HFA,VENTOLIN HFA) inhaler 2 puff (has no administration in time range)   predniSONE tablet 60 mg (60 mg Oral Given 5/14/24 2308)   ipratropium (ATROVENT) 0.02 % inhalation solution 0.5 mg (0.5 mg Nebulization Given 5/14/24 2333)   albuterol inhalation solution 5 mg (5 mg Nebulization Given 5/14/24 2333)   loratadine (CLARITIN) tablet 10 mg (10 mg Oral Given 5/14/24 2333)       Diagnostic Studies  Results Reviewed       Procedure Component Value Units Date/Time    Strep A PCR [[id number]]  (Normal) Collected: 05/14/24 2308    Lab Status: Final result Specimen: Throat Updated: 05/14/24 2337     STREP A PCR Not Detected    FLU/RSV/COVID - if FLU/RSV clinically " relevant [[id number]]  (Normal) Collected: 05/14/24 8307    Lab Status: Final result Specimen: Nares from Nose Updated: 05/14/24 3512     SARS-CoV-2 Negative     INFLUENZA A PCR Negative     INFLUENZA B PCR Negative     RSV PCR Negative    Narrative:      FOR PEDIATRIC PATIENTS - copy/paste COVID Guidelines URL to browser: [email or web address]    SARS-CoV-2 assay is a Nucleic Acid Amplification assay intended for the  qualitative detection of nucleic acid from SARS-CoV-2 in nasopharyngeal  swabs. Results are for the presumptive identification of SARS-CoV-2 RNA.    Positive results are indicative of infection with SARS-CoV-2, the virus  causing COVID-19, but do not rule out bacterial infection or co-infection  with other viruses. Laboratories within the [place] and its  territories are required to report all positive results to the appropriate  public health authorities. Negative results do not preclude SARS-CoV-2  infection and should not be used as the sole basis for treatment or other  patient management decisions. Negative results must be combined with  clinical observations, patient history, and epidemiological information.  This test has not been FDA cleared or approved.    This test has been authorized by FDA under an Emergency Use Authorization  (EUA). This test is only authorized for the duration of time the  declaration that circumstances exist justifying the authorization of the  emergency use of an in vitro diagnostic tests for detection of SARS-CoV-2  virus and/or diagnosis of COVID-19 infection under section 564(b)(1) of  the Act, 21 U.S.C. 360bbb-3(b)(1), unless the authorization is terminated  or revoked sooner. The test has been validated but independent review by FDA  and CLIA is pending.    Test performed using [employer] GeneXpert: This RT-PCR assay targets N2,  a region unique to SARS-CoV-2. A conserved region in the E-gene was chosen  for  pan-Sarbecovirus detection which includes SARS-CoV-2.    According to CMS-2020-01-R, this platform meets the definition of high-throughput technology.                   XR chest 2 views   ED Interpretation by Joycelyn Quintana DO (05/14 2345)   No acute abnormality in the chest.                 Procedures  Procedures         ED Course  ED Course as of 05/14/24 2352   Tue May 14, 2024   2352 Patient feeling much better after the breathing treatment and states she has not coughed nearly as much.  Suspect bronchospasm.  Will start patient on short burst of prednisone and provided inhalers in the ED and to go home with.  Discussed symptomatic management and when to return to the ER.                               SBIRT 22yo+      Flowsheet Row Most Recent Value   Initial Alcohol Screen: US AUDIT-C     1. How often do you have a drink containing alcohol? 0 Filed at: 05/14/2024 2251   2. How many drinks containing alcohol do you have on a typical day you are drinking?  0 Filed at: 05/14/2024 2251   3a. Male UNDER 65: How often do you have five or more drinks on one occasion? 0 Filed at: 05/14/2024 2251   3b. FEMALE Any Age, or MALE 65+: How often do you have 4 or more drinks on one occassion? 0 Filed at: 05/14/2024 2251   Audit-C Score 0 Filed at: 05/14/2024 2251   DELPHINE: How many times in the past year have you...    Used an illegal drug or used a prescription medication for non-medical reasons? Never Filed at: 05/14/2024 2251                      Medical Decision Making  34-year-old female presents to the ED for several days of productive cough, congestion, sore throat and difficulty breathing.  Suspect viral URI/bronchitis with asthma exacerbation.  Although she is not wheezing, I suspect the cough with inhalation is likely due to bronchospasm.  Will obtain chest x-ray to assess for pneumonia.  Will swab for COVID/flu/RSV and for strep.  Will provide prednisone and albuterol/Atrovent breathing treatment for  symptom relief.    Amount and/or Complexity of Data Reviewed  Labs: ordered. Decision-making details documented in ED Course.  Radiology: ordered and independent interpretation performed. Decision-making details documented in ED Course.    Risk  OTC drugs.  Prescription drug management.             Disposition  Final diagnoses:   URI with cough and congestion   Bronchospasm with bronchitis, acute     Time reflects when diagnosis was documented in both MDM as applicable and the Disposition within this note       Time User Action Codes Description Comment    5/14/2024 11:46 PM Joycelyn Quintana Add [J06.9] URI with cough and congestion     5/14/2024 11:46 PM Joycelyn Quintana Add [J20.9] Bronchospasm with bronchitis, acute           ED Disposition       ED Disposition   Discharge    Condition   Stable    Date/Time   Tue May 14, 2024 2350    Comment   Butch Masters discharge to home/self care.                   Follow-up Information       Follow up With Specialties Details Why Contact Info Additional Information    Cindy Pompa DO Family Medicine Schedule an appointment as soon as possible for a visit   [address] [facility] [address]  [phone]       [facility] Emergency Department Emergency Medicine Go to  If symptoms worsen [address] [facility] [address] [phone] [facility] Emergency Department, [address], [place], [address]            Patient's Medications   Discharge Prescriptions    ALBUTEROL (PROVENTIL HFA,VENTOLIN HFA) 90 MCG/ACT INHALER    Inhale 2 puffs every 4 (four) hours as needed for wheezing or shortness of breath       Start Date: 5/14/2024 End Date: --       Order Dose: 2 puffs       Quantity: 6.7 g    Refills: 0    PREDNISONE 20 MG TABLET    Take 2 tablets (40 mg total) by mouth daily for 4 days Do not start before May 15, 2024.       Start Date: 5/15/2024 End Date: 5/19/2024       Order Dose: 40 mg        Quantity: 8 tablet    Refills: 0       No discharge procedures on file.    PDMP Review       None            ED Provider  Electronically Signed by             Joycelyn Quintana DO  05/14/24 8775

## 2025-04-21 NOTE — PROGRESS NOTES
"Name: Katy Gao      : 1935      MRN: [id number]  Encounter Provider: Lea Howard DO  Encounter Date: 2025   Encounter department: [facility]  :  Assessment & Plan  Essential hypertension  Systolic blood pressure remains borderline.  Patient denies any chest pain shortness of breath, dizziness or headache.  Orders:  •  TSH, 3rd generation with Free T4 reflex; Future  •  CBC and differential; Future  •  Comprehensive metabolic panel; Future    Gastroesophageal reflux disease, unspecified whether esophagitis present    Orders:  •  famotidine (PEPCID) 40 MG tablet; Take 1 tablet (40 mg total) by mouth daily    Prediabetes    Orders:  •  Hemoglobin A1C; Future    Acquired hypothyroidism    Orders:  •  TSH, 3rd generation with Free T4 reflex; Future         Patient has appointment in .  She will have blood work prior to that.  We discussed multiple complaints but patient does not want to follow-up with neurology.  History of Present Illness   Patient presents with:  Follow-up: Blood Pressure check   -Hard time walking (legs and back hurt)  -Can't think clearly  -SOB   -Shaky/dizziness  -\"burning stomach\" and has to chew a lot more and harder or she will gag. Has to clear throat a lot.          Review of Systems   Constitutional:  Negative for chills and fever.   HENT:  Negative for congestion and sore throat.    Respiratory:  Positive for shortness of breath. Negative for chest tightness.    Cardiovascular:  Negative for chest pain and palpitations.   Gastrointestinal:  Negative for abdominal pain, constipation, diarrhea and nausea.        Heartburn   Genitourinary:  Negative for difficulty urinating.   Skin: Negative.    Neurological:  Positive for tremors. Negative for dizziness and headaches.   Psychiatric/Behavioral:  Positive for decreased concentration. The patient is nervous/anxious.        Objective   /74 (BP Location: Left arm, Patient Position: Sitting, Cuff " Size: Adult)   Pulse 75   Temp 97.8 °F (36.6 °C) (Temporal)   Wt 63.4 kg (139 lb 12.8 oz)   SpO2 96%   BMI 26.41 kg/m²      Physical Exam  Vitals and nursing note reviewed.   Constitutional:       General: She is not in acute distress.  HENT:      Head: Normocephalic.   Eyes:      General: No scleral icterus.  Neck:      Thyroid: No thyromegaly.   Cardiovascular:      Rate and Rhythm: Normal rate and regular rhythm.      Heart sounds: Normal heart sounds.   Pulmonary:      Effort: Pulmonary effort is normal.      Breath sounds: Normal breath sounds. No wheezing or rhonchi.   Musculoskeletal:      Right lower leg: No edema.      Left lower leg: No edema.   Lymphadenopathy:      Cervical: No cervical adenopathy.   Skin:     General: Skin is warm and dry.      Findings: Lesion (right leg in knee area laterally - erythematous and scaly - was present at last visit.) present.   Neurological:      Mental Status: She is alert and oriented to person, place, and time.   Psychiatric:         Mood and Affect: Mood normal.

## 2025-04-21 NOTE — ASSESSMENT & PLAN NOTE
Orders:  •  TSH, 3rd generation with Free T4 reflex; Future  •  CBC and differential; Future  •  Comprehensive metabolic panel; Future

## 2025-04-21 NOTE — ASSESSMENT & PLAN NOTE
Orders:  •  famotidine (PEPCID) 40 MG tablet; Take 1 tablet (40 mg total) by mouth daily   Imaging Studies/Medications

## 2025-04-21 NOTE — PROGRESS NOTES
"Name: Katy Gao      : 1935      MRN: [id number]  Encounter Provider: Lea Howard DO  Encounter Date: 2025   Encounter department: [facility]  :  Assessment & Plan  Essential hypertension    Orders:  •  TSH, 3rd generation with Free T4 reflex; Future  •  CBC and differential; Future  •  Comprehensive metabolic panel; Future    Gastroesophageal reflux disease, unspecified whether esophagitis present    Orders:  •  famotidine (PEPCID) 40 MG tablet; Take 1 tablet (40 mg total) by mouth daily    Prediabetes    Orders:  •  Hemoglobin A1C; Future    Acquired hypothyroidism    Orders:  •  TSH, 3rd generation with Free T4 reflex; Future         Has appointment to see me on   History of Present Illness {?Quick Links Encounters * My Last Note * Last Note in Specialty * Snapshot * Since Last Visit * History :48312}  Patient presents with:  Follow-up: Blood Pressure check   -Hard time walking (legs and back hurt)  -Can't think clearly  -SOB   -Shaky/dizziness  -\"burning stomach\" and has to chew a lot more and harder or she will gag. Has to clear throat a lot.    Patient is here for follow up of BP  Breathing does not effect her daily activities.  Chews food a lot or she gags.      Review of Systems    Objective {?Quick Links Trend Vitals * Enter New Vitals * Results Review * Timeline (Adult) * Labs * Imaging * Cardiology * Procedures * Lung Cancer Screening * Surgical eConsent :48672}  /74 (BP Location: Left arm, Patient Position: Sitting, Cuff Size: Adult)   Pulse 75   Temp 97.8 °F (36.6 °C) (Temporal)   Wt 63.4 kg (139 lb 12.8 oz)   SpO2 96%   BMI 26.41 kg/m²      Physical Exam  Skin:     Findings: Lesion (right outer leg - erythematous, scaly lesion) present.       {Administrative / Billing Section (Optional):98828}  "

## 2025-05-02 DIAGNOSIS — J43.2 CENTRILOBULAR EMPHYSEMA (HCC): ICD-10-CM

## 2025-05-02 RX ORDER — TIOTROPIUM BROMIDE INHALATION SPRAY 3.12 UG/1
SPRAY, METERED RESPIRATORY (INHALATION)
Qty: 8 G | Refills: 0 | Status: SHIPPED | OUTPATIENT
Start: 2025-05-02

## 2025-05-22 DIAGNOSIS — I10 ESSENTIAL HYPERTENSION: ICD-10-CM

## 2025-05-22 DIAGNOSIS — E03.9 ACQUIRED HYPOTHYROIDISM: ICD-10-CM

## 2025-05-23 RX ORDER — LEVOTHYROXINE SODIUM 25 UG/1
25 TABLET ORAL DAILY
Qty: 90 TABLET | Refills: 1 | Status: SHIPPED | OUTPATIENT
Start: 2025-05-23

## 2025-05-23 RX ORDER — METOPROLOL SUCCINATE 100 MG/1
100 TABLET, EXTENDED RELEASE ORAL DAILY
Qty: 90 TABLET | Refills: 1 | Status: SHIPPED | OUTPATIENT
Start: 2025-05-23

## 2025-06-17 ENCOUNTER — APPOINTMENT (OUTPATIENT)
Dept: LAB | Facility: CLINIC | Age: 89
End: 2025-06-17
Payer: MEDICARE

## 2025-06-17 DIAGNOSIS — I10 ESSENTIAL HYPERTENSION: ICD-10-CM

## 2025-06-17 DIAGNOSIS — R73.03 PREDIABETES: ICD-10-CM

## 2025-06-17 DIAGNOSIS — E03.9 ACQUIRED HYPOTHYROIDISM: ICD-10-CM

## 2025-06-17 LAB
ALBUMIN SERPL BCG-MCNC: 3.7 G/DL (ref 3.5–5)
ALP SERPL-CCNC: 43 U/L (ref 34–104)
ALT SERPL W P-5'-P-CCNC: 12 U/L (ref 7–52)
ANION GAP SERPL CALCULATED.3IONS-SCNC: 6 MMOL/L (ref 4–13)
AST SERPL W P-5'-P-CCNC: 20 U/L (ref 13–39)
BASOPHILS # BLD AUTO: 0.04 THOUSANDS/ÂΜL (ref 0–0.1)
BASOPHILS NFR BLD AUTO: 1 % (ref 0–1)
BILIRUB SERPL-MCNC: 0.67 MG/DL (ref 0.2–1)
BUN SERPL-MCNC: 18 MG/DL (ref 5–25)
CALCIUM SERPL-MCNC: 9.1 MG/DL (ref 8.4–10.2)
CHLORIDE SERPL-SCNC: 102 MMOL/L (ref 96–108)
CO2 SERPL-SCNC: 29 MMOL/L (ref 21–32)
CREAT SERPL-MCNC: 0.76 MG/DL (ref 0.6–1.3)
EOSINOPHIL # BLD AUTO: 0.23 THOUSAND/ÂΜL (ref 0–0.61)
EOSINOPHIL NFR BLD AUTO: 3 % (ref 0–6)
ERYTHROCYTE [DISTWIDTH] IN BLOOD BY AUTOMATED COUNT: 14 % (ref 11.6–15.1)
EST. AVERAGE GLUCOSE BLD GHB EST-MCNC: 126 MG/DL
GFR SERPL CREATININE-BSD FRML MDRD: 70 ML/MIN/1.73SQ M
GLUCOSE P FAST SERPL-MCNC: 110 MG/DL (ref 65–99)
HBA1C MFR BLD: 6 %
HCT VFR BLD AUTO: 41.6 % (ref 34.8–46.1)
HGB BLD-MCNC: 13.6 G/DL (ref 11.5–15.4)
IMM GRANULOCYTES # BLD AUTO: 0.02 THOUSAND/UL (ref 0–0.2)
IMM GRANULOCYTES NFR BLD AUTO: 0 % (ref 0–2)
LYMPHOCYTES # BLD AUTO: 1.48 THOUSANDS/ÂΜL (ref 0.6–4.47)
LYMPHOCYTES NFR BLD AUTO: 20 % (ref 14–44)
MCH RBC QN AUTO: 29.9 PG (ref 26.8–34.3)
MCHC RBC AUTO-ENTMCNC: 32.7 G/DL (ref 31.4–37.4)
MCV RBC AUTO: 91 FL (ref 82–98)
MONOCYTES # BLD AUTO: 0.67 THOUSAND/ÂΜL (ref 0.17–1.22)
MONOCYTES NFR BLD AUTO: 9 % (ref 4–12)
NEUTROPHILS # BLD AUTO: 4.88 THOUSANDS/ÂΜL (ref 1.85–7.62)
NEUTS SEG NFR BLD AUTO: 67 % (ref 43–75)
NRBC BLD AUTO-RTO: 0 /100 WBCS
PLATELET # BLD AUTO: 161 THOUSANDS/UL (ref 149–390)
PMV BLD AUTO: 14.2 FL (ref 8.9–12.7)
POTASSIUM SERPL-SCNC: 4.3 MMOL/L (ref 3.5–5.3)
PROT SERPL-MCNC: 6.3 G/DL (ref 6.4–8.4)
RBC # BLD AUTO: 4.55 MILLION/UL (ref 3.81–5.12)
SODIUM SERPL-SCNC: 137 MMOL/L (ref 135–147)
TSH SERPL DL<=0.05 MIU/L-ACNC: 2.11 UIU/ML (ref 0.45–4.5)
WBC # BLD AUTO: 7.32 THOUSAND/UL (ref 4.31–10.16)

## 2025-06-17 PROCEDURE — 85025 COMPLETE CBC W/AUTO DIFF WBC: CPT

## 2025-06-17 PROCEDURE — 36415 COLL VENOUS BLD VENIPUNCTURE: CPT

## 2025-06-17 PROCEDURE — 83036 HEMOGLOBIN GLYCOSYLATED A1C: CPT

## 2025-06-17 PROCEDURE — 80053 COMPREHEN METABOLIC PANEL: CPT

## 2025-06-17 PROCEDURE — 84443 ASSAY THYROID STIM HORMONE: CPT

## 2025-06-25 ENCOUNTER — OFFICE VISIT (OUTPATIENT)
Dept: FAMILY MEDICINE CLINIC | Facility: CLINIC | Age: 89
End: 2025-06-25
Payer: MEDICARE

## 2025-06-25 VITALS
HEART RATE: 76 BPM | WEIGHT: 138 LBS | BODY MASS INDEX: 26.06 KG/M2 | OXYGEN SATURATION: 93 % | TEMPERATURE: 98.1 F | SYSTOLIC BLOOD PRESSURE: 150 MMHG | DIASTOLIC BLOOD PRESSURE: 70 MMHG | HEIGHT: 61 IN

## 2025-06-25 DIAGNOSIS — R04.0 EPISTAXIS: ICD-10-CM

## 2025-06-25 DIAGNOSIS — C43.9 MELANOMA OF SKIN (HCC): ICD-10-CM

## 2025-06-25 DIAGNOSIS — E78.2 MIXED HYPERLIPIDEMIA: ICD-10-CM

## 2025-06-25 DIAGNOSIS — R73.03 PREDIABETES: ICD-10-CM

## 2025-06-25 DIAGNOSIS — Z00.00 ENCOUNTER FOR SUBSEQUENT ANNUAL WELLNESS VISIT (AWV) IN MEDICARE PATIENT: ICD-10-CM

## 2025-06-25 DIAGNOSIS — R53.83 FATIGUE, UNSPECIFIED TYPE: ICD-10-CM

## 2025-06-25 DIAGNOSIS — I10 ESSENTIAL HYPERTENSION: Primary | ICD-10-CM

## 2025-06-25 DIAGNOSIS — E03.9 ACQUIRED HYPOTHYROIDISM: ICD-10-CM

## 2025-06-25 PROCEDURE — G0439 PPPS, SUBSEQ VISIT: HCPCS | Performed by: FAMILY MEDICINE

## 2025-06-25 PROCEDURE — G2211 COMPLEX E/M VISIT ADD ON: HCPCS | Performed by: FAMILY MEDICINE

## 2025-06-25 PROCEDURE — 99214 OFFICE O/P EST MOD 30 MIN: CPT | Performed by: FAMILY MEDICINE

## 2025-06-25 RX ORDER — VALSARTAN 320 MG/1
320 TABLET ORAL DAILY
Qty: 90 TABLET | Refills: 1 | Status: SHIPPED | OUTPATIENT
Start: 2025-06-25

## 2025-06-25 NOTE — PROGRESS NOTES
Name: Katy Gao      : 1935      MRN: [id number]  Encounter Provider: Lea Howard DO  Encounter Date: 2025   Encounter department: [facility]  :  Assessment & Plan  Essential hypertension    Orders:  •  valsartan (DIOVAN) 320 MG tablet; Take 1 tablet (320 mg total) by mouth daily    Prediabetes         Epistaxis         Mixed hyperlipidemia         Fatigue, unspecified type         Acquired hypothyroidism         Encounter for subsequent annual wellness visit (AWV) in Medicare patient         Melanoma of skin (HCC)           Depression Screening and Follow-up Plan: Patient was screened for depression during today's encounter. They screened negative with a PHQ-9 score of 3.        Preventive health issues were discussed with patient, and age appropriate screening tests were ordered as noted in patient's After Visit Summary. Personalized health advice and appropriate referrals for health education or preventive services given if needed, as noted in patient's After Visit Summary.    History of Present Illness     Patient is seen for wellness visit.  She also relates that she had two nose bleds about a month ago. Her shirt got blood on it. Denies any cold symptoms.       Patient Care Team:  Lea Howard DO as PCP - General (Family Medicine)    Review of Systems   Constitutional:  Negative for chills and fever.   HENT:  Negative for congestion and sore throat.    Respiratory:  Negative for chest tightness.    Cardiovascular:  Negative for chest pain and palpitations.   Gastrointestinal:  Negative for abdominal pain, constipation, diarrhea and nausea.   Genitourinary:  Negative for difficulty urinating.   Skin: Negative.    Neurological:  Negative for dizziness and headaches.   Psychiatric/Behavioral: Negative.       Medical History Reviewed by provider this encounter:       Annual Wellness Visit Questionnaire   Katy is here for her Subsequent Wellness visit. Last Medicare Wellness  visit information reviewed, patient interviewed and updates made to the record today.      Health Risk Assessment:   Patient rates overall health as fair. Patient feels that their physical health rating is same. Patient is satisfied with their life. Eyesight was rated as same. Hearing was rated as same. Patient feels that their emotional and mental health rating is same. Patients states they are never, rarely angry. Patient states they are often unusually tired/fatigued. Pain experienced in the last 7 days has been none. Patient states that she has experienced no weight loss or gain in last 6 months.     Depression Screening:   PHQ-9 Score: 3      Fall Risk Screening:   In the past year, patient has experienced: no history of falling in past year      Urinary Incontinence Screening:   Patient has not leaked urine accidently in the last six months.     Home Safety:  Patient does not have trouble with stairs inside or outside of their home. Patient has working smoke alarms and has working carbon monoxide detector. Home safety hazards include: none.     Nutrition:   Current diet is Regular.     Medications:   Patient is currently taking over-the-counter supplements. OTC medications include: see medication list. Patient is able to manage medications.     Activities of Daily Living (ADLs)/Instrumental Activities of Daily Living (IADLs):   Walk and transfer into and out of bed and chair?: Yes  Dress and groom yourself?: Yes    Bathe or shower yourself?: Yes    Feed yourself? Yes  Do your laundry/housekeeping?: Yes  Manage your money, pay your bills and track your expenses?: Yes  Make your own meals?: Yes    Do your own shopping?: Yes    Previous Hospitalizations:   Any hospitalizations or ED visits within the last 12 months?: No      Advance Care Planning:   Living will: Yes    Durable POA for healthcare: Yes    Advanced directive: Yes    End of Life Decisions reviewed with patient: Yes      Cognitive Screening:  "  Provider or family/friend/caregiver concerned regarding cognition?: Yes    Cognition Comments: Patient feels her memory is slipping.    Preventive Screenings      Cardiovascular Screening:    General: Screening Not Indicated and History Lipid Disorder      Diabetes Screening:     General: Screening Current      Colorectal Cancer Screening:     General: Screening Not Indicated      Cervical Cancer Screening:    General: Screening Not Indicated      Osteoporosis Screening:    General: Screening Not Indicated and History Osteoporosis      Lung Cancer Screening:     General: Screening Not Indicated    Screening, Brief Intervention, and Referral to Treatment (SBIRT)     Screening  Typical number of drinks in a day: 0  Typical number of drinks in a week: 0  Interpretation: Low risk drinking behavior.    Social Drivers of Health     Financial Resource Strain: Low Risk  (4/10/2023)    Overall Financial Resource Strain (CARDIA)    • Difficulty of Paying Living Expenses: Not hard at all   Food Insecurity: No Food Insecurity (6/25/2025)    Nursing - Inadequate Food Risk Classification    • Worried About Running Out of Food in the Last Year: Never true    • Ran Out of Food in the Last Year: Never true   Transportation Needs: No Transportation Needs (6/25/2025)    PRAPARE - Transportation    • Lack of Transportation (Medical): No    • Lack of Transportation (Non-Medical): No   Housing Stability: Low Risk  (6/25/2025)    Housing Stability Vital Sign    • Unable to Pay for Housing in the Last Year: No    • Number of Times Moved in the Last Year: 0    • Homeless in the Last Year: No   Utilities: Not At Risk (6/25/2025)    [facility] Utilities    • Threatened with loss of utilities: No     No results found.    Objective   /70 (BP Location: Right arm, Patient Position: Sitting, Cuff Size: Standard)   Pulse 76   Temp 98.1 °F (36.7 °C)   Ht 5' 1\" (1.549 m)   Wt 62.6 kg (138 lb)   SpO2 93%   BMI 26.07 kg/m²     Physical " Exam  Vitals and nursing note reviewed.   Constitutional:       General: She is not in acute distress.  HENT:      Head: Normocephalic.      Right Ear: Tympanic membrane normal.      Left Ear: Tympanic membrane normal.      Mouth/Throat:      Pharynx: No posterior oropharyngeal erythema.   Neck:      Thyroid: No thyromegaly.      Vascular: No carotid bruit.     Cardiovascular:      Rate and Rhythm: Normal rate and regular rhythm.      Heart sounds: Normal heart sounds.   Pulmonary:      Effort: Pulmonary effort is normal.      Breath sounds: Normal breath sounds.     Musculoskeletal:      Right lower leg: No edema.      Left lower leg: No edema.   Lymphadenopathy:      Cervical: No cervical adenopathy.     Skin:     General: Skin is warm and dry.      Comments: Lesion on right knee - skin biopsy by derm     Neurological:      Mental Status: She is alert and oriented to person, place, and time.     Psychiatric:         Mood and Affect: Mood normal.

## 2025-06-25 NOTE — PATIENT INSTRUCTIONS
Medicare Preventive Visit Patient Instructions  Thank you for completing your Welcome to Medicare Visit or Medicare Annual Wellness Visit today. Your next wellness visit will be due in one year (6/26/2026).  The screening/preventive services that you may require over the next 5-10 years are detailed below. Some tests may not apply to you based off risk factors and/or age. Screening tests ordered at today's visit but not completed yet may show as past due. Also, please note that scanned in results may not display below.  Preventive Screenings:  Service Recommendations Previous Testing/Comments   Colorectal Cancer Screening  * Colonoscopy    * Fecal Occult Blood Test (FOBT)/Fecal Immunochemical Test (FIT)  * Fecal DNA/Cologuard Test  * Flexible Sigmoidoscopy Age: 45-75 years old   Colonoscopy: every 10 years (may be performed more frequently if at higher risk)  OR  FOBT/FIT: every 1 year  OR  Cologuard: every 3 years  OR  Sigmoidoscopy: every 5 years  Screening may be recommended earlier than age 45 if at higher risk for colorectal cancer. Also, an individualized decision between you and your healthcare provider will decide whether screening between the ages of 76-85 would be appropriate. Colonoscopy: Not on file  FOBT/FIT: Not on file  Cologuard: Not on file  Sigmoidoscopy: Not on file          Breast Cancer Screening Age: 40+ years old  Frequency: every 1-2 years  Not required if history of left and right mastectomy Mammogram: 11/09/2015        Cervical Cancer Screening Between the ages of 21-29, pap smear recommended once every 3 years.   Between the ages of 30-65, can perform pap smear with HPV co-testing every 5 years.   Recommendations may differ for women with a history of total hysterectomy, cervical cancer, or abnormal pap smears in past. Pap Smear: Not on file        Hepatitis C Screening Once for adults born between 1945 and 1965  More frequently in patients at high risk for Hepatitis C Hep C Antibody: Not  on file        Diabetes Screening 1-2 times per year if you're at risk for diabetes or have pre-diabetes Fasting glucose: 110 mg/dL (6/17/2025)  A1C: 6.0 % (6/17/2025)      Cholesterol Screening Once every 5 years if you don't have a lipid disorder. May order more often based on risk factors. Lipid panel: 02/22/2025          Other Preventive Screenings Covered by Medicare:  Abdominal Aortic Aneurysm (AAA) Screening: covered once if your at risk. You're considered to be at risk if you have a family history of AAA.  Lung Cancer Screening: covers low dose CT scan once per year if you meet all of the following conditions: (1) Age 55-77; (2) No signs or symptoms of lung cancer; (3) Current smoker or have quit smoking within the last 15 years; (4) You have a tobacco smoking history of at least 20 pack years (packs per day multiplied by number of years you smoked); (5) You get a written order from a healthcare provider.  Glaucoma Screening: covered annually if you're considered high risk: (1) You have diabetes OR (2) Family history of glaucoma OR (3)  aged 50 and older OR (4)  American aged 65 and older  Osteoporosis Screening: covered every 2 years if you meet one of the following conditions: (1) You're estrogen deficient and at risk for osteoporosis based off medical history and other findings; (2) Have a vertebral abnormality; (3) On glucocorticoid therapy for more than 3 months; (4) Have primary hyperparathyroidism; (5) On osteoporosis medications and need to assess response to drug therapy.   Last bone density test (DXA Scan): 07/15/2021.  HIV Screening: covered annually if you're between the age of 15-65. Also covered annually if you are younger than 15 and older than 65 with risk factors for HIV infection. For pregnant patients, it is covered up to 3 times per pregnancy.    Immunizations:  Immunization Recommendations   Influenza Vaccine Annual influenza vaccination during flu season is  recommended for all persons aged >= 6 months who do not have contraindications   Pneumococcal Vaccine   * Pneumococcal conjugate vaccine = PCV13 (Prevnar 13), PCV15 (Vaxneuvance), PCV20 (Prevnar 20)  * Pneumococcal polysaccharide vaccine = PPSV23 (Pneumovax) Adults 19-63 yo with certain risk factors or if 65+ yo  If never received any pneumonia vaccine: recommend Prevnar 20 (PCV20)  Give PCV20 if previously received 1 dose of PCV13 or PPSV23   Hepatitis B Vaccine 3 dose series if at intermediate or high risk (ex: diabetes, end stage renal disease, liver disease)   Respiratory syncytial virus (RSV) Vaccine - COVERED BY MEDICARE PART D  * RSVPreF3 (Arexvy) CDC recommends that adults 60 years of age and older may receive a single dose of RSV vaccine using shared clinical decision-making (SCDM)   Tetanus (Td) Vaccine - COST NOT COVERED BY MEDICARE PART B Following completion of primary series, a booster dose should be given every 10 years to maintain immunity against tetanus. Td may also be given as tetanus wound prophylaxis.   Tdap Vaccine - COST NOT COVERED BY MEDICARE PART B Recommended at least once for all adults. For pregnant patients, recommended with each pregnancy.   Shingles Vaccine (Shingrix) - COST NOT COVERED BY MEDICARE PART B  2 shot series recommended in those 19 years and older who have or will have weakened immune systems or those 50 years and older     Health Maintenance Due:      Topic Date Due   • DXA SCAN  07/15/2023     Immunizations Due:      Topic Date Due   • COVID-19 Vaccine (4 - 2024-25 season) 09/01/2024     Advance Directives   What are advance directives?  Advance directives are legal documents that state your wishes and plans for medical care. These plans are made ahead of time in case you lose your ability to make decisions for yourself. Advance directives can apply to any medical decision, such as the treatments you want, and if you want to donate organs.   What are the types of advance  directives?  There are many types of advance directives, and each state has rules about how to use them. You may choose a combination of any of the following:  Living will:  This is a written record of the treatment you want. You can also choose which treatments you do not want, which to limit, and which to stop at a certain time. This includes surgery, medicine, IV fluid, and tube feedings.   Durable power of  for healthcare (DPAHC):  This is a written record that states who you want to make healthcare choices for you when you are unable to make them for yourself. This person, called a proxy, is usually a family member or a friend. You may choose more than 1 proxy.  Do not resuscitate (DNR) order:  A DNR order is used in case your heart stops beating or you stop breathing. It is a request not to have certain forms of treatment, such as CPR. A DNR order may be included in other types of advance directives.  Medical directive:  This covers the care that you want if you are in a coma, near death, or unable to make decisions for yourself. You can list the treatments you want for each condition. Treatment may include pain medicine, surgery, blood transfusions, dialysis, IV or tube feedings, and a ventilator (breathing machine).  Values history:  This document has questions about your views, beliefs, and how you feel and think about life. This information can help others choose the care that you would choose.  Why are advance directives important?  An advance directive helps you control your care. Although spoken wishes may be used, it is better to have your wishes written down. Spoken wishes can be misunderstood, or not followed. Treatments may be given even if you do not want them. An advance directive may make it easier for your family to make difficult choices about your care.   Weight Management   Why it is important to manage your weight:  Being overweight increases your risk of health conditions such as  heart disease, high blood pressure, type 2 diabetes, and certain types of cancer. It can also increase your risk for osteoarthritis, sleep apnea, and other respiratory problems. Aim for a slow, steady weight loss. Even a small amount of weight loss can lower your risk of health problems.  How to lose weight safely:  A safe and healthy way to lose weight is to eat fewer calories and get regular exercise. You can lose up about 1 pound a week by decreasing the number of calories you eat by 500 calories each day.   Healthy meal plan for weight management:  A healthy meal plan includes a variety of foods, contains fewer calories, and helps you stay healthy. A healthy meal plan includes the following:  Eat whole-grain foods more often.  A healthy meal plan should contain fiber. Fiber is the part of grains, fruits, and vegetables that is not broken down by your body. Whole-grain foods are healthy and provide extra fiber in your diet. Some examples of whole-grain foods are whole-wheat breads and pastas, oatmeal, brown rice, and bulgur.  Eat a variety of vegetables every day.  Include dark, leafy greens such as spinach, kale, nellie greens, and mustard greens. Eat yellow and orange vegetables such as carrots, sweet potatoes, and winter squash.   Eat a variety of fruits every day.  Choose fresh or canned fruit (canned in its own juice or light syrup) instead of juice. Fruit juice has very little or no fiber.  Eat low-fat dairy foods.  Drink fat-free (skim) milk or 1% milk. Eat fat-free yogurt and low-fat cottage cheese. Try low-fat cheeses such as mozzarella and other reduced-fat cheeses.  Choose meat and other protein foods that are low in fat.  Choose beans or other legumes such as split peas or lentils. Choose fish, skinless poultry (chicken or turkey), or lean cuts of red meat (beef or pork). Before you cook meat or poultry, cut off any visible fat.   Use less fat and oil.  Try baking foods instead of frying them. Add  less fat, such as margarine, sour cream, regular salad dressing and mayonnaise to foods. Eat fewer high-fat foods. Some examples of high-fat foods include french fries, doughnuts, ice cream, and cakes.  Eat fewer sweets.  Limit foods and drinks that are high in sugar. This includes candy, cookies, regular soda, and sweetened drinks.  Exercise:  Exercise at least 30 minutes per day on most days of the week. Some examples of exercise include walking, biking, dancing, and swimming. You can also fit in more physical activity by taking the stairs instead of the elevator or parking farther away from stores. Ask your healthcare provider about the best exercise plan for you.    © Copyright [employer] 2018 Information is for End User's use only and may not be sold, redistributed or otherwise used for commercial purposes. All illustrations and images included in CareNotes® are the copyrighted property of [employer] or [employer]

## 2025-07-08 DIAGNOSIS — K21.9 GASTROESOPHAGEAL REFLUX DISEASE, UNSPECIFIED WHETHER ESOPHAGITIS PRESENT: ICD-10-CM

## 2025-07-08 DIAGNOSIS — J43.2 CENTRILOBULAR EMPHYSEMA (HCC): ICD-10-CM

## 2025-07-10 ENCOUNTER — TELEPHONE (OUTPATIENT)
Age: 89
End: 2025-07-10

## 2025-07-10 RX ORDER — TIOTROPIUM BROMIDE INHALATION SPRAY 3.12 UG/1
SPRAY, METERED RESPIRATORY (INHALATION)
Qty: 8 G | Refills: 2 | Status: SHIPPED | OUTPATIENT
Start: 2025-07-10

## 2025-07-10 RX ORDER — FAMOTIDINE 40 MG/1
40 TABLET, FILM COATED ORAL DAILY
Qty: 30 TABLET | Refills: 5 | Status: SHIPPED | OUTPATIENT
Start: 2025-07-10

## 2025-07-10 NOTE — TELEPHONE ENCOUNTER
Patient calling complaining of extreme fatigue and tiredness since her follow up visit w/ Primary Care Provider. Patient reports starting a new medication at last visit on 6/25/25 and has been very fatigued ever since. Patient asking for provider's advice.  She thanks us for our help!

## 2025-07-11 ENCOUNTER — CLINICAL SUPPORT (OUTPATIENT)
Dept: FAMILY MEDICINE CLINIC | Facility: CLINIC | Age: 89
End: 2025-07-11

## 2025-07-11 ENCOUNTER — HOSPITAL ENCOUNTER (EMERGENCY)
Facility: HOSPITAL | Age: 89
Discharge: HOME | End: 2025-07-11
Attending: EMERGENCY MEDICINE
Payer: MEDICARE

## 2025-07-11 ENCOUNTER — APPOINTMENT (EMERGENCY)
Dept: CT IMAGING | Facility: HOSPITAL | Age: 89
End: 2025-07-11
Payer: MEDICARE

## 2025-07-11 ENCOUNTER — APPOINTMENT (EMERGENCY)
Dept: RADIOLOGY | Facility: HOSPITAL | Age: 89
End: 2025-07-11
Payer: MEDICARE

## 2025-07-11 VITALS
SYSTOLIC BLOOD PRESSURE: 176 MMHG | BODY MASS INDEX: 26.08 KG/M2 | HEART RATE: 78 BPM | DIASTOLIC BLOOD PRESSURE: 79 MMHG | RESPIRATION RATE: 20 BRPM | OXYGEN SATURATION: 96 % | WEIGHT: 138.01 LBS | TEMPERATURE: 98 F

## 2025-07-11 VITALS — DIASTOLIC BLOOD PRESSURE: 98 MMHG | SYSTOLIC BLOOD PRESSURE: 200 MMHG

## 2025-07-11 DIAGNOSIS — I10 UNCONTROLLED HYPERTENSION: Primary | ICD-10-CM

## 2025-07-11 DIAGNOSIS — I10 ESSENTIAL HYPERTENSION: Primary | ICD-10-CM

## 2025-07-11 LAB
ANION GAP SERPL CALCULATED.3IONS-SCNC: 6 MMOL/L (ref 4–13)
BASOPHILS # BLD AUTO: 0.04 THOUSANDS/ÂΜL (ref 0–0.1)
BASOPHILS NFR BLD AUTO: 1 % (ref 0–1)
BUN SERPL-MCNC: 20 MG/DL (ref 5–25)
CALCIUM SERPL-MCNC: 9.4 MG/DL (ref 8.4–10.2)
CARDIAC TROPONIN I PNL SERPL HS: 7 NG/L (ref ?–50)
CHLORIDE SERPL-SCNC: 102 MMOL/L (ref 96–108)
CO2 SERPL-SCNC: 29 MMOL/L (ref 21–32)
CREAT SERPL-MCNC: 0.82 MG/DL (ref 0.6–1.3)
EOSINOPHIL # BLD AUTO: 0.06 THOUSAND/ÂΜL (ref 0–0.61)
EOSINOPHIL NFR BLD AUTO: 1 % (ref 0–6)
ERYTHROCYTE [DISTWIDTH] IN BLOOD BY AUTOMATED COUNT: 13.7 % (ref 11.6–15.1)
GFR SERPL CREATININE-BSD FRML MDRD: 64 ML/MIN/1.73SQ M
GLUCOSE SERPL-MCNC: 123 MG/DL (ref 65–140)
HCT VFR BLD AUTO: 44.3 % (ref 34.8–46.1)
HGB BLD-MCNC: 14.5 G/DL (ref 11.5–15.4)
IMM GRANULOCYTES # BLD AUTO: 0.03 THOUSAND/UL (ref 0–0.2)
IMM GRANULOCYTES NFR BLD AUTO: 0 % (ref 0–2)
LYMPHOCYTES # BLD AUTO: 1.44 THOUSANDS/ÂΜL (ref 0.6–4.47)
LYMPHOCYTES NFR BLD AUTO: 18 % (ref 14–44)
MAGNESIUM SERPL-MCNC: 2.2 MG/DL (ref 1.9–2.7)
MCH RBC QN AUTO: 29.8 PG (ref 26.8–34.3)
MCHC RBC AUTO-ENTMCNC: 32.7 G/DL (ref 31.4–37.4)
MCV RBC AUTO: 91 FL (ref 82–98)
MONOCYTES # BLD AUTO: 0.68 THOUSAND/ÂΜL (ref 0.17–1.22)
MONOCYTES NFR BLD AUTO: 8 % (ref 4–12)
NEUTROPHILS # BLD AUTO: 5.91 THOUSANDS/ÂΜL (ref 1.85–7.62)
NEUTS SEG NFR BLD AUTO: 72 % (ref 43–75)
NRBC BLD AUTO-RTO: 0 /100 WBCS
PLATELET # BLD AUTO: 233 THOUSANDS/UL (ref 149–390)
PMV BLD AUTO: 11 FL (ref 8.9–12.7)
POTASSIUM SERPL-SCNC: 4.2 MMOL/L (ref 3.5–5.3)
RBC # BLD AUTO: 4.86 MILLION/UL (ref 3.81–5.12)
SODIUM SERPL-SCNC: 137 MMOL/L (ref 135–147)
TSH SERPL DL<=0.05 MIU/L-ACNC: 1.9 UIU/ML (ref 0.45–4.5)
WBC # BLD AUTO: 8.16 THOUSAND/UL (ref 4.31–10.16)

## 2025-07-11 PROCEDURE — 93005 ELECTROCARDIOGRAM TRACING: CPT

## 2025-07-11 PROCEDURE — 80048 BASIC METABOLIC PNL TOTAL CA: CPT | Performed by: EMERGENCY MEDICINE

## 2025-07-11 PROCEDURE — 85025 COMPLETE CBC W/AUTO DIFF WBC: CPT | Performed by: EMERGENCY MEDICINE

## 2025-07-11 PROCEDURE — 99285 EMERGENCY DEPT VISIT HI MDM: CPT | Performed by: EMERGENCY MEDICINE

## 2025-07-11 PROCEDURE — 84443 ASSAY THYROID STIM HORMONE: CPT | Performed by: EMERGENCY MEDICINE

## 2025-07-11 PROCEDURE — 84484 ASSAY OF TROPONIN QUANT: CPT | Performed by: EMERGENCY MEDICINE

## 2025-07-11 PROCEDURE — 36415 COLL VENOUS BLD VENIPUNCTURE: CPT | Performed by: EMERGENCY MEDICINE

## 2025-07-11 PROCEDURE — NURSE

## 2025-07-11 PROCEDURE — 71046 X-RAY EXAM CHEST 2 VIEWS: CPT

## 2025-07-11 PROCEDURE — 70450 CT HEAD/BRAIN W/O DYE: CPT

## 2025-07-11 PROCEDURE — 99284 EMERGENCY DEPT VISIT MOD MDM: CPT

## 2025-07-11 PROCEDURE — 83735 ASSAY OF MAGNESIUM: CPT | Performed by: EMERGENCY MEDICINE

## 2025-07-11 NOTE — ED PROVIDER NOTES
Time reflects when diagnosis was documented in both MDM as applicable and the Disposition within this note       Time User Action Codes Description Comment    7/11/2025  4:55 PM Tangela Gutierrez Add [I10] Uncontrolled hypertension           ED Disposition       ED Disposition   Discharge    Condition   Stable    Date/Time   Fri Jul 11, 2025  4:55 PM    Comment   Katy Gao discharge to home/self care.                   Assessment & Plan       Medical Decision Making  Sent in from PCP for elevated blood pressure - relatively asymptomatic.  Only c/o fatigue/tiredness since med change last month - nothing acute. Will get EKG to r/o arrhythmia, ischemic changes.  Will get labs to r/o acute life threatening metabolic abnl, renal dysfunction, electrolyte abnl, cardiac ischemia, significant anemia.  Will get CXR to r/o occult pna, pulm edema, cardiomeg, effusions. CTH to r/o subacute cva, mass      Problems Addressed:  Uncontrolled hypertension: chronic illness or injury with exacerbation, progression, or side effects of treatment    Amount and/or Complexity of Data Reviewed  Independent Historian: spouse  Labs: ordered.  Radiology: ordered and independent interpretation performed.  ECG/medicine tests: ordered and independent interpretation performed.        ED Course as of 07/11/25 2022 Fri Jul 11, 2025   1551 Labs unremarkable  Awaiting CTH       Medications - No data to display    ED Risk Strat Scores                    No data recorded        SBIRT 22yo+      Flowsheet Row Most Recent Value   Initial Alcohol Screen: US AUDIT-C     1. How often do you have a drink containing alcohol? 0 Filed at: 07/11/2025 1417   2. How many drinks containing alcohol do you have on a typical day you are drinking?  0 Filed at: 07/11/2025 1417   3a. Male UNDER 65: How often do you have five or more drinks on one occasion? 0 Filed at: 07/11/2025 1417   3b. FEMALE Any Age, or MALE 65+: How often do you have 4 or more drinks on one  occassion? 0 Filed at: 07/11/2025 1417   Audit-C Score 0 Filed at: 07/11/2025 1417   RAFIA: How many times in the past year have you...    Used an illegal drug or used a prescription medication for non-medical reasons? Never Filed at: 07/11/2025 1417                            History of Present Illness       Chief Complaint   Patient presents with    High Blood Pressure     Sent over from [facility] for hbp. Pt denies other symptoms        Past Medical History[1]   Past Surgical History[2]   Family History[3]   Social History[4]   E-Cigarette/Vaping    E-Cigarette Use Never User       E-Cigarette/Vaping Substances    Nicotine No     THC No     CBD No     Flavoring No     Other No     Unknown No       I have reviewed and agree with the history as documented.     Patient presents with:  High Blood Pressure: Sent over from [facility] for hbp. Pt denies other symptoms     89y F sent in from PCP for elevated blood pressure.  Seen by PCP 6/25 and had her valsartan increased from 160mg to 320mg.  Since then pt reports increased fatigue / tiredness. Denies cp/pressure, no palpitations, no sob/chandra, no abd pain, normal appetite, no n/v, no changes in urination. Denies changes in speech or vision, no extremity  numbness or weakness.  No other complaints.          History provided by:  Patient and medical records   used: No        Review of Systems   All other systems reviewed and are negative.          Objective       ED Triage Vitals   Temperature Pulse Blood Pressure Respirations SpO2 Patient Position - Orthostatic VS   07/11/25 1334 07/11/25 1334 07/11/25 1334 07/11/25 1334 07/11/25 1334 07/11/25 1334   98 °F (36.7 °C) 85 160/72 16 97 % Sitting      Temp src Heart Rate Source BP Location FiO2 (%) Pain Score    -- 07/11/25 1545 07/11/25 1334 -- 07/11/25 1700     Monitor Right arm  No Pain      Vitals      Date and Time Temp Pulse SpO2 Resp BP Pain Score FACES Pain Rating User   07/11/25 1700  -- 78 96 % 20 176/79 No Pain -- LAB   07/11/25 1545 -- 86 -- 29 197/81 -- -- LAB   07/11/25 1334 98 °F (36.7 °C) 85 97 % 16 160/72 -- -- ANGEL            Physical Exam  Vitals and nursing note reviewed.   Constitutional:       General: She is not in acute distress.     Appearance: Normal appearance. She is not ill-appearing, toxic-appearing or diaphoretic.   HENT:      Mouth/Throat:      Mouth: Mucous membranes are moist.     Eyes:      Conjunctiva/sclera: Conjunctivae normal.       Cardiovascular:      Rate and Rhythm: Normal rate and regular rhythm.   Pulmonary:      Effort: Pulmonary effort is normal.      Breath sounds: Normal breath sounds.     Musculoskeletal:         General: No tenderness.      Cervical back: Normal range of motion.     Skin:     General: Skin is warm.     Neurological:      General: No focal deficit present.      Mental Status: She is alert and oriented to person, place, and time.      Cranial Nerves: No cranial nerve deficit.      Sensory: No sensory deficit.      Motor: No weakness.      Coordination: Coordination normal.     Psychiatric:         Mood and Affect: Mood normal.         Results Reviewed       Procedure Component Value Units Date/Time    TSH, 3rd generation with Free T4 reflex [[id number]]  (Normal) Collected: 07/11/25 1432    Lab Status: Final result Specimen: Blood from Arm, Left Updated: 07/11/25 1516     TSH 3RD GENERATION 1.901 uIU/mL     HS Troponin 0hr (reflex protocol) [[id number]]  (Normal) Collected: 07/11/25 1432    Lab Status: Final result Specimen: Blood from Arm, Left Updated: 07/11/25 1507     hs TnI 0hr 7 ng/L     Basic metabolic panel [[id number]] Collected: 07/11/25 1432    Lab Status: Final result Specimen: Blood from Arm, Left Updated: 07/11/25 1502     Sodium 137 mmol/L      Potassium 4.2 mmol/L      Chloride 102 mmol/L      CO2 29 mmol/L      ANION GAP 6 mmol/L      BUN 20 mg/dL      Creatinine 0.82 mg/dL      Glucose 123 mg/dL      Calcium 9.4 mg/dL      eGFR  64 ml/min/1.73sq m     Narrative:      National Kid[employer] Foundation guidelines for Chronic Kidney Disease (CKD):     Stage 1 with normal or high GFR (GFR > 90 mL/min/1.73 square meters)    Stage 2 Mild CKD (GFR = 60-89 mL/min/1.73 square meters)    Stage 3A Moderate CKD (GFR = 45-59 mL/min/1.73 square meters)    Stage 3B Moderate CKD (GFR = 30-44 mL/min/1.73 square meters)    Stage 4 Severe CKD (GFR = 15-29 mL/min/1.73 square meters)    Stage 5 End Stage CKD (GFR <15 mL/min/1.73 square meters)  Note: GFR calculation is accurate only with a steady state creatinine    Magnesium [[id number]]  (Normal) Collected: 07/11/25 1432    Lab Status: Final result Specimen: Blood from Arm, Left Updated: 07/11/25 1502     Magnesium 2.2 mg/dL     CBC and differential [[id number]] Collected: 07/11/25 1432    Lab Status: Final result Specimen: Blood from Arm, Left Updated: 07/11/25 1444     WBC 8.16 Thousand/uL      RBC 4.86 Million/uL      Hemoglobin 14.5 g/dL      Hematocrit 44.3 %      MCV 91 fL      MCH 29.8 pg      MCHC 32.7 g/dL      RDW 13.7 %      MPV 11.0 fL      Platelets 233 Thousands/uL      nRBC 0 /100 WBCs      Segmented % 72 %      Immature Grans % 0 %      Lymphocytes % 18 %      Monocytes % 8 %      Eosinophils Relative 1 %      Basophils Relative 1 %      Absolute Neutrophils 5.91 Thousands/µL      Absolute Immature Grans 0.03 Thousand/uL      Absolute Lymphocytes 1.44 Thousands/µL      Absolute Monocytes 0.68 Thousand/µL      Eosinophils Absolute 0.06 Thousand/µL      Basophils Absolute 0.04 Thousands/µL             CT head without contrast   ED Interpretation by Tangela Gutierrez DO (07/11 1655)   See below      Final Interpretation by Rebekah Bartlett MD (07/11 1635)      No acute intracranial abnormality.                  Workstation performed: [id number]         XR chest 2 views   ED Interpretation by Tangela Gutierrez DO (07/11 7)   Xray reviewed and independently interpreted by me: no acute findings.   Formal reading per radiology            ECG 12 Lead Documentation Only    Date/Time: 7/11/2025 2:45 PM    Performed by: Tangela Gutierrez DO  Authorized by: Tangela Gutierrez DO    Indications / Diagnosis:  Elevated blood pressure, fatigue  ECG reviewed by me, the ED Provider: yes    Patient location:  ED  Previous ECG:     Previous ECG:  Unavailable  Interpretation:     Interpretation: non-specific    Rate:     ECG rate:  83    ECG rate assessment: normal    Rhythm:     Rhythm: sinus rhythm    ST segments:     ST segments:  Non-specific  T waves:     T waves: non-specific    Other findings:     Other findings: LVH        ED Medication and Procedure Management   Prior to Admission Medications   Prescriptions Last Dose Informant Patient Reported? Taking?   Apoaequorin (PREVAGEN PO) 7/11/2025 Morning Self Yes Yes   Sig: Take by mouth   Cholecalciferol (Vitamin D3) 25 MCG (1000 UT) CAPS 7/11/2025 Morning Self Yes Yes   Sig: Take by mouth   Cyanocobalamin (VITAMIN B-12 PO) 7/11/2025 Morning Self Yes Yes   Sig: Take by mouth   Propylene Glycol (SYSTANE COMPLETE OP) 7/11/2025 Self Yes Yes   Sig: Administer 1 drop to both eyes in the morning and 1 drop before bedtime.   Spiriva Respimat 2.5 MCG/ACT AERS inhaler 7/11/2025 Morning  No Yes   Sig: INHALE 2 SPRAY(S) BY MOUTH ONCE DAILY   ascorbic acid (VITAMIN C) 1000 MG tablet 7/11/2025 Morning Self Yes Yes   Sig: Take 1,000 mg by mouth in the morning.   aspirin (ECOTRIN LOW STRENGTH) 81 mg EC tablet 7/11/2025 Morning Self Yes Yes   Sig: Take 81 mg by mouth in the morning.   famotidine (PEPCID) 40 MG tablet   No No   Sig: Take 1 tablet by mouth once daily   levothyroxine 25 mcg tablet 7/11/2025 Morning Self No Yes   Sig: Take 1 tablet by mouth once daily   metoprolol succinate (TOPROL-XL) 100 mg 24 hr tablet 7/11/2025 Self No Yes   Sig: Take 1 tablet by mouth once daily   simvastatin (ZOCOR) 20 mg tablet 7/10/2025 Self No Yes   Sig: TAKE 1 TABLET BY MOUTH ONCE DAILY AT BEDTIME    valsartan (DIOVAN) 320 MG tablet 7/11/2025  No Yes   Sig: Take 1 tablet (320 mg total) by mouth daily      Facility-Administered Medications: None     Discharge Medication List as of 7/11/2025  4:56 PM        CONTINUE these medications which have NOT CHANGED    Details   Apoaequorin (PREVAGEN PO) Take by mouth, Historical Med      ascorbic acid (VITAMIN C) 1000 MG tablet Take 1,000 mg by mouth in the morning., Historical Med      aspirin (ECOTRIN LOW STRENGTH) 81 mg EC tablet Take 81 mg by mouth in the morning., Historical Med      Cholecalciferol (Vitamin D3) 25 MCG (1000 UT) CAPS Take by mouth, Historical Med      Cyanocobalamin (VITAMIN B-12 PO) Take by mouth, Historical Med      levothyroxine 25 mcg tablet Take 1 tablet by mouth once daily, Starting Fri 5/23/2025, Normal      metoprolol succinate (TOPROL-XL) 100 mg 24 hr tablet Take 1 tablet by mouth once daily, Starting Fri 5/23/2025, Normal      Propylene Glycol (SYSTANE COMPLETE OP) Administer 1 drop to both eyes in the morning and 1 drop before bedtime., Historical Med      simvastatin (ZOCOR) 20 mg tablet TAKE 1 TABLET BY MOUTH ONCE DAILY AT BEDTIME, Starting Mon 3/24/2025, Normal      Spiriva Respimat 2.5 MCG/ACT AERS inhaler INHALE 2 SPRAY(S) BY MOUTH ONCE DAILY, Normal      valsartan (DIOVAN) 320 MG tablet Take 1 tablet (320 mg total) by mouth daily, Starting Wed 6/25/2025, Normal      famotidine (PEPCID) 40 MG tablet Take 1 tablet by mouth once daily, Starting Thu 7/10/2025, Normal           No discharge procedures on file.  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  Message sent to Dr. Pennington's office regarding the same.   [2]   Past Surgical History:  Procedure Laterality Date    HYSTERECTOMY      TOTAL SHOULDER ARTHROPLASTY Left    [3]   Family History  Problem Relation Name Age of Onset    Heart attack Mother      Cerebral aneurysm Father      Diabetes Sister     [4]   Social History  Tobacco Use    Smoking status: Former     Current packs/day: 0.00     Average packs/day: 0.3 packs/day for 40.0 years (10.0 ttl pk-yrs)     Types: Cigarettes     Start date:      Quit date:      Years since quittin.5     Passive exposure: Never    Smokeless tobacco: Never   Vaping Use    Vaping status: Never Used   Substance Use Topics    Alcohol use: Not Currently    Drug use: Never        Tangela Gutierrez DO  25

## 2025-07-11 NOTE — DISCHARGE INSTRUCTIONS
Follow up with your primary care provider for further management of your blood pressure.    Start keeping a blood pressure log:    Take your blood pressure twice a day - once in the morning and once at night.    Make sure you have been sitting for at least 10 minutes and are calm and relaxed prior to taking the blood pressure.  Document the readings and keep the log to discuss with your primary care doctor.  If your blood pressure is elevated DO NOT keep rechecking it because it will likely just continue to increase because of your concern/worry.  You DO NOT need to come to the Emergency Department for an elevated blood pressure unless you are having signs of a stroke (numbness/weakness to one side of the body, trouble speaking, trouble with balance) or heart attack (severe chest pain, trouble breathing).

## 2025-07-12 LAB
ATRIAL RATE: 83 BPM
P AXIS: 38 DEGREES
PR INTERVAL: 178 MS
QRS AXIS: 3 DEGREES
QRSD INTERVAL: 96 MS
QT INTERVAL: 382 MS
QTC INTERVAL: 448 MS
T WAVE AXIS: 160 DEGREES
VENTRICULAR RATE: 83 BPM

## 2025-07-12 PROCEDURE — 93010 ELECTROCARDIOGRAM REPORT: CPT | Performed by: INTERNAL MEDICINE

## 2025-07-13 ENCOUNTER — NURSE TRIAGE (OUTPATIENT)
Dept: OTHER | Facility: OTHER | Age: 89
End: 2025-07-13

## 2025-07-13 NOTE — TELEPHONE ENCOUNTER
REASON FOR CONVERSATION: Hypertension    SYMPTOMS: Call was transferred from [place]- patient is with her neighbor/friend Allison- Patient gave verbal permission for staff to speak to Allison regarding her health and health care today.     Allison noted that patient was seen in the [facility] for HTN on 7/11. Reviewed ED visit and noted patient's BP was 191/81 and HR 86. No medication changes noted on AVS or interventions completed during stay noted during chart review. Allison noted that today patient's BP is again 191/unknown# and unknown HR. She is having some weakness, she was shaky, and some nose bleeds (last one was a couple days ago). Allison noted the patient has been sitting for about an hour at this time and is stable. She repeated the BP during call and repeat was 161/79 and HR 80. She denies patient having any chest pain, difficulty breathing, headache, vision changes, and does not feel like she is going to pass out at this time.       OTHER HEALTH INFORMATION: NA    PROTOCOL DISPOSITION: See PCP Within 24 Hours    CARE ADVICE PROVIDED: An appointment was scheduled tentatively at her PCP's office with the PA on July 16th at 1445- Allison verbalized understanding but only agreed to appointment if Dr. Howard is unable to fit patient into her schedule this week.       Advised Allison that patient needs to monitor and call back if her symptoms change/worsen. If she develops chest pain, difficulty breathing, and/or signs of a stroke -facial droop on one side, increased weakness on one side of her body, difficulty speaking or understanding language- she should call 911 immediately- Allison verbalized understanding to all information provided.    PRACTICE FOLLOW-UP: Yes, please follow up with patient Monday asap to see if she can be rescheduled with her PCP Dr. Howard and per protocol appointment would be within 24 hours of this triage.        Reason for Disposition   Systolic BP  >= 180 OR Diastolic >=  "110    Answer Assessment - Initial Assessment Questions  1. BLOOD PRESSURE: \"What is your blood pressure?\" \"Did you take at least two measurements 5 minutes apart?\"       Patient is with her neighbor, Allison, she provided the PEP permission for staff to  discuss with Allison regarding her health and health care.     In hospital recently for HTN, didn't adjust medications at that time.   Currently having nose bleeds, shaky, BP right now (right before she called in) 191/      2. ONSET: \"When did you take your blood pressure?\"        Since last week    3. HOW: \"How did you take your blood pressure?\" (e.g., automatic home BP monitor, visiting nurse)        Automatic home BP    4. HISTORY: \"Do you have a history of high blood pressure?\"        Yes    5. MEDICINES: \"Are you taking any medicines for blood pressure?\" \"Have you missed any doses recently?\"        Yes, pt takes BP medications.     6. OTHER SYMPTOMS: \"Do you have any symptoms?\" (e.g., blurred vision, chest pain, difficulty breathing, headache, weakness)      Having nose bleeds having them more frequently, shaky, weakness, not sturdy    Denies chest pain, difficulty breathing, blurred vision, headache, doesn't feel like she is going to pass out.    Protocols used: Blood Pressure - High-Adult-    "

## 2025-07-15 ENCOUNTER — OFFICE VISIT (OUTPATIENT)
Dept: FAMILY MEDICINE CLINIC | Facility: CLINIC | Age: 89
End: 2025-07-15
Payer: MEDICARE

## 2025-07-15 VITALS
WEIGHT: 137.4 LBS | TEMPERATURE: 98.5 F | HEART RATE: 74 BPM | SYSTOLIC BLOOD PRESSURE: 158 MMHG | OXYGEN SATURATION: 98 % | DIASTOLIC BLOOD PRESSURE: 78 MMHG | BODY MASS INDEX: 25.96 KG/M2

## 2025-07-15 DIAGNOSIS — F41.9 ANXIETY: ICD-10-CM

## 2025-07-15 DIAGNOSIS — I10 ESSENTIAL HYPERTENSION: Primary | ICD-10-CM

## 2025-07-15 DIAGNOSIS — E03.9 ACQUIRED HYPOTHYROIDISM: ICD-10-CM

## 2025-07-15 PROCEDURE — 99214 OFFICE O/P EST MOD 30 MIN: CPT | Performed by: FAMILY MEDICINE

## 2025-07-15 PROCEDURE — G2211 COMPLEX E/M VISIT ADD ON: HCPCS | Performed by: FAMILY MEDICINE

## 2025-07-15 RX ORDER — SERTRALINE HYDROCHLORIDE 25 MG/1
25 TABLET, FILM COATED ORAL DAILY
Qty: 90 TABLET | Refills: 1 | Status: SHIPPED | OUTPATIENT
Start: 2025-07-15 | End: 2026-01-11

## 2025-07-18 ENCOUNTER — TELEPHONE (OUTPATIENT)
Age: 89
End: 2025-07-18

## 2025-07-18 NOTE — TELEPHONE ENCOUNTER
Patient called in regarding sertraline. She stated she just picked it up, wanted to know what it is for. Attempted to warm transfer to pod clinical and office clinical. Please kindly call patient back at [phone]. Thank you.

## 2025-07-29 ENCOUNTER — TELEPHONE (OUTPATIENT)
Age: 89
End: 2025-07-29